# Patient Record
Sex: FEMALE | Race: WHITE | NOT HISPANIC OR LATINO
[De-identification: names, ages, dates, MRNs, and addresses within clinical notes are randomized per-mention and may not be internally consistent; named-entity substitution may affect disease eponyms.]

---

## 2017-02-09 ENCOUNTER — APPOINTMENT (OUTPATIENT)
Dept: RHEUMATOLOGY | Facility: CLINIC | Age: 77
End: 2017-02-09

## 2017-02-13 ENCOUNTER — RESULT REVIEW (OUTPATIENT)
Age: 77
End: 2017-02-13

## 2017-02-14 ENCOUNTER — APPOINTMENT (OUTPATIENT)
Dept: DERMATOLOGY | Facility: CLINIC | Age: 77
End: 2017-02-14

## 2017-02-18 ENCOUNTER — MEDICATION RENEWAL (OUTPATIENT)
Age: 77
End: 2017-02-18

## 2017-03-09 ENCOUNTER — APPOINTMENT (OUTPATIENT)
Dept: RHEUMATOLOGY | Facility: CLINIC | Age: 77
End: 2017-03-09

## 2017-03-16 ENCOUNTER — APPOINTMENT (OUTPATIENT)
Dept: RHEUMATOLOGY | Facility: CLINIC | Age: 77
End: 2017-03-16

## 2017-03-16 ENCOUNTER — RESULT REVIEW (OUTPATIENT)
Age: 77
End: 2017-03-16

## 2017-03-16 VITALS
RESPIRATION RATE: 18 BRPM | TEMPERATURE: 98.2 F | SYSTOLIC BLOOD PRESSURE: 134 MMHG | BODY MASS INDEX: 27.49 KG/M2 | DIASTOLIC BLOOD PRESSURE: 80 MMHG | HEART RATE: 80 BPM | WEIGHT: 165 LBS | HEIGHT: 65 IN

## 2017-03-17 ENCOUNTER — APPOINTMENT (OUTPATIENT)
Dept: DERMATOLOGY | Facility: CLINIC | Age: 77
End: 2017-03-17

## 2017-03-17 ENCOUNTER — RESULT CHARGE (OUTPATIENT)
Age: 77
End: 2017-03-17

## 2017-03-17 ENCOUNTER — APPOINTMENT (OUTPATIENT)
Dept: RHEUMATOLOGY | Facility: CLINIC | Age: 77
End: 2017-03-17

## 2017-03-19 LAB
ALBUMIN SERPL ELPH-MCNC: 4.3 G/DL
ALP BLD-CCNC: 103 U/L
ALT SERPL-CCNC: 38 U/L
ANION GAP SERPL CALC-SCNC: 14 MMOL/L
AST SERPL-CCNC: 32 U/L
BASOPHILS # BLD AUTO: 0.03 K/UL
BASOPHILS NFR BLD AUTO: 0.5 %
BILIRUB SERPL-MCNC: 0.4 MG/DL
BUN SERPL-MCNC: 19 MG/DL
CALCIUM SERPL-MCNC: 9.5 MG/DL
CHLORIDE SERPL-SCNC: 101 MMOL/L
CO2 SERPL-SCNC: 24 MMOL/L
CREAT SERPL-MCNC: 0.63 MG/DL
CRP SERPL-MCNC: <0.2 MG/DL
ENA SS-A AB SER IA-ACNC: <0.2 AL
ENA SS-B AB SER IA-ACNC: <0.2 AL
EOSINOPHIL # BLD AUTO: 0.18 K/UL
EOSINOPHIL NFR BLD AUTO: 3.1 %
GLUCOSE SERPL-MCNC: 78 MG/DL
HCT VFR BLD CALC: 40.8 %
HGB BLD-MCNC: 13.2 G/DL
IMM GRANULOCYTES NFR BLD AUTO: 0.2 %
LDH SERPL-CCNC: 218 U/L
LYMPHOCYTES # BLD AUTO: 1.48 K/UL
LYMPHOCYTES NFR BLD AUTO: 25.6 %
MAN DIFF?: NORMAL
MCHC RBC-ENTMCNC: 29.9 PG
MCHC RBC-ENTMCNC: 32.4 GM/DL
MCV RBC AUTO: 92.3 FL
MONOCYTES # BLD AUTO: 0.55 K/UL
MONOCYTES NFR BLD AUTO: 9.5 %
NEUTROPHILS # BLD AUTO: 3.54 K/UL
NEUTROPHILS NFR BLD AUTO: 61.1 %
PHOSPHATE SERPL-MCNC: 4.1 MG/DL
PLATELET # BLD AUTO: 341 K/UL
POTASSIUM SERPL-SCNC: 5.7 MMOL/L
PROT SERPL-MCNC: 6.9 G/DL
RBC # BLD: 4.42 M/UL
RBC # FLD: 13.7 %
SODIUM SERPL-SCNC: 139 MMOL/L
WBC # FLD AUTO: 5.79 K/UL

## 2017-03-24 ENCOUNTER — APPOINTMENT (OUTPATIENT)
Dept: DERMATOLOGY | Facility: CLINIC | Age: 77
End: 2017-03-24

## 2017-03-30 ENCOUNTER — APPOINTMENT (OUTPATIENT)
Dept: RHEUMATOLOGY | Facility: CLINIC | Age: 77
End: 2017-03-30

## 2017-07-03 ENCOUNTER — APPOINTMENT (OUTPATIENT)
Dept: RHEUMATOLOGY | Facility: CLINIC | Age: 77
End: 2017-07-03

## 2017-07-03 VITALS
TEMPERATURE: 98 F | HEIGHT: 65 IN | WEIGHT: 165 LBS | RESPIRATION RATE: 20 BRPM | BODY MASS INDEX: 27.49 KG/M2 | DIASTOLIC BLOOD PRESSURE: 78 MMHG | HEART RATE: 84 BPM | SYSTOLIC BLOOD PRESSURE: 122 MMHG

## 2017-07-03 DIAGNOSIS — M25.562 PAIN IN RIGHT KNEE: ICD-10-CM

## 2017-07-03 DIAGNOSIS — G89.29 PAIN IN RIGHT KNEE: ICD-10-CM

## 2017-07-03 DIAGNOSIS — M25.561 PAIN IN RIGHT KNEE: ICD-10-CM

## 2017-07-04 LAB
25(OH)D3 SERPL-MCNC: 54.7 NG/ML
ALBUMIN SERPL ELPH-MCNC: 4.4 G/DL
ALP BLD-CCNC: 66 U/L
ALT SERPL-CCNC: 22 U/L
ANION GAP SERPL CALC-SCNC: 18 MMOL/L
AST SERPL-CCNC: 27 U/L
BASOPHILS # BLD AUTO: 0.01 K/UL
BASOPHILS NFR BLD AUTO: 0.2 %
BILIRUB SERPL-MCNC: 0.3 MG/DL
BUN SERPL-MCNC: 16 MG/DL
CALCIUM SERPL-MCNC: 10.2 MG/DL
CHLORIDE SERPL-SCNC: 99 MMOL/L
CO2 SERPL-SCNC: 21 MMOL/L
CREAT SERPL-MCNC: 0.69 MG/DL
CRP SERPL-MCNC: <0.2 MG/DL
EOSINOPHIL # BLD AUTO: 0.26 K/UL
EOSINOPHIL NFR BLD AUTO: 4.5 %
GLUCOSE SERPL-MCNC: 62 MG/DL
HCT VFR BLD CALC: 40.1 %
HGB BLD-MCNC: 13.4 G/DL
IMM GRANULOCYTES NFR BLD AUTO: 0.2 %
LYMPHOCYTES # BLD AUTO: 1.46 K/UL
LYMPHOCYTES NFR BLD AUTO: 25.3 %
MAN DIFF?: NORMAL
MCHC RBC-ENTMCNC: 30.2 PG
MCHC RBC-ENTMCNC: 33.4 GM/DL
MCV RBC AUTO: 90.3 FL
MONOCYTES # BLD AUTO: 0.66 K/UL
MONOCYTES NFR BLD AUTO: 11.4 %
NEUTROPHILS # BLD AUTO: 3.37 K/UL
NEUTROPHILS NFR BLD AUTO: 58.4 %
PHOSPHATE SERPL-MCNC: 4.7 MG/DL
PLATELET # BLD AUTO: 337 K/UL
POTASSIUM SERPL-SCNC: 4.7 MMOL/L
PROT SERPL-MCNC: 7 G/DL
RBC # BLD: 4.44 M/UL
RBC # FLD: 14.7 %
SODIUM SERPL-SCNC: 138 MMOL/L
TSH SERPL-ACNC: 1.41 UIU/ML
WBC # FLD AUTO: 5.77 K/UL

## 2017-07-06 LAB
BILIRUB UR QL STRIP: NORMAL
COLLECTION METHOD: NORMAL
DEPRECATED KAPPA LC FREE/LAMBDA SER: 1.62 RATIO
ENA SS-A AB SER IA-ACNC: <0.2 AL
ENA SS-B AB SER IA-ACNC: <0.2 AL
GLUCOSE UR-MCNC: NORMAL
HCG UR QL: 0.2 EU/DL
HGB UR QL STRIP.AUTO: NORMAL
IGA SER QL IEP: 105 MG/DL
IGG SER QL IEP: 812 MG/DL
IGM SER QL IEP: 96 MG/DL
KAPPA LC CSF-MCNC: 1.51 MG/DL
KAPPA LC SERPL-MCNC: 2.45 MG/DL
KETONES UR-MCNC: NORMAL
LEUKOCYTE ESTERASE UR QL STRIP: NORMAL
M PROTEIN SPEC IFE-MCNC: NORMAL
NITRITE UR QL STRIP: NORMAL
PH UR STRIP: 6
PROT UR STRIP-MCNC: NORMAL
SP GR UR STRIP: 1.01
WESR: 5

## 2017-09-28 ENCOUNTER — APPOINTMENT (OUTPATIENT)
Dept: RHEUMATOLOGY | Facility: CLINIC | Age: 77
End: 2017-09-28

## 2017-10-02 ENCOUNTER — CLINICAL ADVICE (OUTPATIENT)
Age: 77
End: 2017-10-02

## 2017-10-12 ENCOUNTER — APPOINTMENT (OUTPATIENT)
Dept: RHEUMATOLOGY | Facility: CLINIC | Age: 77
End: 2017-10-12
Payer: MEDICARE

## 2017-10-12 PROCEDURE — 96372 THER/PROPH/DIAG INJ SC/IM: CPT

## 2017-11-03 ENCOUNTER — APPOINTMENT (OUTPATIENT)
Dept: RHEUMATOLOGY | Facility: CLINIC | Age: 77
End: 2017-11-03
Payer: MEDICARE

## 2017-11-03 VITALS
DIASTOLIC BLOOD PRESSURE: 63 MMHG | TEMPERATURE: 98.7 F | RESPIRATION RATE: 18 BRPM | BODY MASS INDEX: 24.99 KG/M2 | HEART RATE: 78 BPM | WEIGHT: 150 LBS | SYSTOLIC BLOOD PRESSURE: 139 MMHG | HEIGHT: 65 IN | OXYGEN SATURATION: 94 %

## 2017-11-03 PROCEDURE — 36415 COLL VENOUS BLD VENIPUNCTURE: CPT

## 2017-11-03 PROCEDURE — 99215 OFFICE O/P EST HI 40 MIN: CPT | Mod: 25

## 2017-11-03 PROCEDURE — 85651 RBC SED RATE NONAUTOMATED: CPT

## 2017-11-03 PROCEDURE — 81003 URINALYSIS AUTO W/O SCOPE: CPT | Mod: QW

## 2017-11-04 LAB
ALBUMIN SERPL ELPH-MCNC: 4 G/DL
ALP BLD-CCNC: 81 U/L
ALT SERPL-CCNC: 21 U/L
ANION GAP SERPL CALC-SCNC: 14 MMOL/L
AST SERPL-CCNC: 23 U/L
BASOPHILS # BLD AUTO: 0.02 K/UL
BASOPHILS NFR BLD AUTO: 0.4 %
BILIRUB SERPL-MCNC: 0.2 MG/DL
BILIRUB UR QL STRIP: NORMAL
BUN SERPL-MCNC: 17 MG/DL
CALCIUM SERPL-MCNC: 9.3 MG/DL
CHLORIDE SERPL-SCNC: 103 MMOL/L
CO2 SERPL-SCNC: 23 MMOL/L
COLLECTION METHOD: NORMAL
CREAT SERPL-MCNC: 0.63 MG/DL
CRP SERPL-MCNC: <0.2 MG/DL
ENA SS-A AB SER IA-ACNC: <0.2 AL
ENA SS-B AB SER IA-ACNC: <0.2 AL
EOSINOPHIL # BLD AUTO: 0.29 K/UL
EOSINOPHIL NFR BLD AUTO: 5.1 %
GLUCOSE SERPL-MCNC: 72 MG/DL
GLUCOSE UR-MCNC: NORMAL
HCG UR QL: 0.2 EU/DL
HCT VFR BLD CALC: 36.8 %
HGB BLD-MCNC: 12.6 G/DL
HGB UR QL STRIP.AUTO: NORMAL
IMM GRANULOCYTES NFR BLD AUTO: 0 %
KETONES UR-MCNC: NORMAL
LEUKOCYTE ESTERASE UR QL STRIP: NORMAL
LYMPHOCYTES # BLD AUTO: 1.19 K/UL
LYMPHOCYTES NFR BLD AUTO: 20.8 %
MAN DIFF?: NORMAL
MCHC RBC-ENTMCNC: 31.5 PG
MCHC RBC-ENTMCNC: 34.2 GM/DL
MCV RBC AUTO: 92 FL
MONOCYTES # BLD AUTO: 0.72 K/UL
MONOCYTES NFR BLD AUTO: 12.6 %
NEUTROPHILS # BLD AUTO: 3.49 K/UL
NEUTROPHILS NFR BLD AUTO: 61.1 %
NITRITE UR QL STRIP: NORMAL
PH UR STRIP: 5.5
PHOSPHATE SERPL-MCNC: 4 MG/DL
PLATELET # BLD AUTO: 310 K/UL
POTASSIUM SERPL-SCNC: 4.9 MMOL/L
PROT SERPL-MCNC: 6 G/DL
PROT UR STRIP-MCNC: NORMAL
RBC # BLD: 4 M/UL
RBC # FLD: 14.1 %
SODIUM SERPL-SCNC: 140 MMOL/L
SP GR UR STRIP: 1.02
TSH SERPL-ACNC: 1.39 UIU/ML
WBC # FLD AUTO: 5.71 K/UL

## 2017-11-05 RX ORDER — MECLOFENAMATE SODIUM 100 MG/1
100 CAPSULE ORAL
Qty: 180 | Refills: 0 | Status: DISCONTINUED | COMMUNITY
Start: 2017-07-03 | End: 2017-11-05

## 2017-11-05 RX ORDER — MECLOFENAMATE SODIUM 100 MG/1
100 CAPSULE ORAL
Qty: 60 | Refills: 0 | Status: DISCONTINUED | COMMUNITY
Start: 2017-07-03 | End: 2017-11-05

## 2017-11-06 LAB — WESR: 3

## 2017-11-27 ENCOUNTER — APPOINTMENT (OUTPATIENT)
Dept: ORTHOPEDIC SURGERY | Facility: CLINIC | Age: 77
End: 2017-11-27
Payer: MEDICARE

## 2017-11-27 VITALS
DIASTOLIC BLOOD PRESSURE: 95 MMHG | HEIGHT: 64 IN | WEIGHT: 150 LBS | TEMPERATURE: 98.3 F | BODY MASS INDEX: 25.61 KG/M2 | SYSTOLIC BLOOD PRESSURE: 158 MMHG | HEART RATE: 81 BPM

## 2017-11-27 DIAGNOSIS — M17.0 BILATERAL PRIMARY OSTEOARTHRITIS OF KNEE: ICD-10-CM

## 2017-11-27 PROCEDURE — 99214 OFFICE O/P EST MOD 30 MIN: CPT

## 2017-12-12 ENCOUNTER — APPOINTMENT (OUTPATIENT)
Dept: RHEUMATOLOGY | Facility: CLINIC | Age: 77
End: 2017-12-12
Payer: MEDICARE

## 2017-12-12 PROCEDURE — 77080 DXA BONE DENSITY AXIAL: CPT

## 2018-01-03 ENCOUNTER — TRANSCRIPTION ENCOUNTER (OUTPATIENT)
Age: 78
End: 2018-01-03

## 2018-01-09 ENCOUNTER — FORM ENCOUNTER (OUTPATIENT)
Age: 78
End: 2018-01-09

## 2018-01-10 ENCOUNTER — OUTPATIENT (OUTPATIENT)
Dept: OUTPATIENT SERVICES | Facility: HOSPITAL | Age: 78
LOS: 1 days | End: 2018-01-10
Payer: MEDICARE

## 2018-01-10 VITALS
RESPIRATION RATE: 20 BRPM | WEIGHT: 145.51 LBS | DIASTOLIC BLOOD PRESSURE: 99 MMHG | TEMPERATURE: 98 F | SYSTOLIC BLOOD PRESSURE: 159 MMHG | HEIGHT: 62 IN | HEART RATE: 77 BPM

## 2018-01-10 DIAGNOSIS — Z01.818 ENCOUNTER FOR OTHER PREPROCEDURAL EXAMINATION: ICD-10-CM

## 2018-01-10 DIAGNOSIS — F32.9 MAJOR DEPRESSIVE DISORDER, SINGLE EPISODE, UNSPECIFIED: ICD-10-CM

## 2018-01-10 DIAGNOSIS — Z98.890 OTHER SPECIFIED POSTPROCEDURAL STATES: Chronic | ICD-10-CM

## 2018-01-10 DIAGNOSIS — R56.9 UNSPECIFIED CONVULSIONS: ICD-10-CM

## 2018-01-10 DIAGNOSIS — M17.12 UNILATERAL PRIMARY OSTEOARTHRITIS, LEFT KNEE: ICD-10-CM

## 2018-01-10 DIAGNOSIS — S62.101A FRACTURE OF UNSPECIFIED CARPAL BONE, RIGHT WRIST, INITIAL ENCOUNTER FOR CLOSED FRACTURE: Chronic | ICD-10-CM

## 2018-01-10 LAB
ANION GAP SERPL CALC-SCNC: 11 MMOL/L — SIGNIFICANT CHANGE UP (ref 5–17)
APTT BLD: 29.4 SEC — SIGNIFICANT CHANGE UP (ref 27.5–37.4)
BASOPHILS # BLD AUTO: 0 K/UL — SIGNIFICANT CHANGE UP (ref 0–0.2)
BASOPHILS NFR BLD AUTO: 0.3 % — SIGNIFICANT CHANGE UP (ref 0–2)
BLD GP AB SCN SERPL QL: SIGNIFICANT CHANGE UP
BUN SERPL-MCNC: 12 MG/DL — SIGNIFICANT CHANGE UP (ref 8–20)
CALCIUM SERPL-MCNC: 9.6 MG/DL — SIGNIFICANT CHANGE UP (ref 8.6–10.2)
CHLORIDE SERPL-SCNC: 99 MMOL/L — SIGNIFICANT CHANGE UP (ref 98–107)
CO2 SERPL-SCNC: 28 MMOL/L — SIGNIFICANT CHANGE UP (ref 22–29)
CREAT SERPL-MCNC: 0.45 MG/DL — LOW (ref 0.5–1.3)
EOSINOPHIL # BLD AUTO: 0.2 K/UL — SIGNIFICANT CHANGE UP (ref 0–0.5)
EOSINOPHIL NFR BLD AUTO: 3.4 % — SIGNIFICANT CHANGE UP (ref 0–6)
GLUCOSE SERPL-MCNC: 79 MG/DL — SIGNIFICANT CHANGE UP (ref 70–115)
HCT VFR BLD CALC: 40.4 % — SIGNIFICANT CHANGE UP (ref 37–47)
HGB BLD-MCNC: 13.4 G/DL — SIGNIFICANT CHANGE UP (ref 12–16)
INR BLD: 0.99 RATIO — SIGNIFICANT CHANGE UP (ref 0.88–1.16)
LYMPHOCYTES # BLD AUTO: 1.6 K/UL — SIGNIFICANT CHANGE UP (ref 1–4.8)
LYMPHOCYTES # BLD AUTO: 24.1 % — SIGNIFICANT CHANGE UP (ref 20–55)
MCHC RBC-ENTMCNC: 30.6 PG — SIGNIFICANT CHANGE UP (ref 27–31)
MCHC RBC-ENTMCNC: 33.2 G/DL — SIGNIFICANT CHANGE UP (ref 32–36)
MCV RBC AUTO: 92.2 FL — SIGNIFICANT CHANGE UP (ref 81–99)
MONOCYTES # BLD AUTO: 0.5 K/UL — SIGNIFICANT CHANGE UP (ref 0–0.8)
MONOCYTES NFR BLD AUTO: 7.6 % — SIGNIFICANT CHANGE UP (ref 3–10)
MRSA PCR RESULT.: SIGNIFICANT CHANGE UP
NEUTROPHILS # BLD AUTO: 4.2 K/UL — SIGNIFICANT CHANGE UP (ref 1.8–8)
NEUTROPHILS NFR BLD AUTO: 64.4 % — SIGNIFICANT CHANGE UP (ref 37–73)
PLATELET # BLD AUTO: 385 K/UL — SIGNIFICANT CHANGE UP (ref 150–400)
POTASSIUM SERPL-MCNC: 5.5 MMOL/L — HIGH (ref 3.5–5.3)
POTASSIUM SERPL-SCNC: 5.5 MMOL/L — HIGH (ref 3.5–5.3)
PROTHROM AB SERPL-ACNC: 10.9 SEC — SIGNIFICANT CHANGE UP (ref 9.8–12.7)
RBC # BLD: 4.38 M/UL — LOW (ref 4.4–5.2)
RBC # FLD: 12.9 % — SIGNIFICANT CHANGE UP (ref 11–15.6)
S AUREUS DNA NOSE QL NAA+PROBE: SIGNIFICANT CHANGE UP
SODIUM SERPL-SCNC: 138 MMOL/L — SIGNIFICANT CHANGE UP (ref 135–145)
TYPE + AB SCN PNL BLD: SIGNIFICANT CHANGE UP
WBC # BLD: 6.5 K/UL — SIGNIFICANT CHANGE UP (ref 4.8–10.8)
WBC # FLD AUTO: 6.5 K/UL — SIGNIFICANT CHANGE UP (ref 4.8–10.8)

## 2018-01-10 PROCEDURE — G0463: CPT

## 2018-01-10 PROCEDURE — 93010 ELECTROCARDIOGRAM REPORT: CPT

## 2018-01-10 PROCEDURE — 71046 X-RAY EXAM CHEST 2 VIEWS: CPT

## 2018-01-10 PROCEDURE — 85027 COMPLETE CBC AUTOMATED: CPT

## 2018-01-10 PROCEDURE — 80048 BASIC METABOLIC PNL TOTAL CA: CPT

## 2018-01-10 PROCEDURE — 86850 RBC ANTIBODY SCREEN: CPT

## 2018-01-10 PROCEDURE — 93005 ELECTROCARDIOGRAM TRACING: CPT

## 2018-01-10 PROCEDURE — 71046 X-RAY EXAM CHEST 2 VIEWS: CPT | Mod: 26

## 2018-01-10 PROCEDURE — 86900 BLOOD TYPING SEROLOGIC ABO: CPT

## 2018-01-10 PROCEDURE — 36415 COLL VENOUS BLD VENIPUNCTURE: CPT

## 2018-01-10 PROCEDURE — 87641 MR-STAPH DNA AMP PROBE: CPT

## 2018-01-10 PROCEDURE — 86901 BLOOD TYPING SEROLOGIC RH(D): CPT

## 2018-01-10 PROCEDURE — 85610 PROTHROMBIN TIME: CPT

## 2018-01-10 PROCEDURE — 87640 STAPH A DNA AMP PROBE: CPT

## 2018-01-10 PROCEDURE — 85730 THROMBOPLASTIN TIME PARTIAL: CPT

## 2018-01-10 RX ORDER — SUMATRIPTAN SUCCINATE 4 MG/.5ML
1 INJECTION, SOLUTION SUBCUTANEOUS
Qty: 0 | Refills: 0 | COMMUNITY

## 2018-01-10 NOTE — H&P PST ADULT - HISTORY OF PRESENT ILLNESS
Pt is a 77 y.o female with OA of bilateral knees for the past couple of years now scheduled for a left knee total joint replacement.

## 2018-01-10 NOTE — H&P PST ADULT - ASSESSMENT
Pt is a 77 y.o female with PMH of seizures, depression, and OA undergoing a left knee total joint replacement. Instructed to hold any medications containing ibuprofen 1 week prior to surgery. Hold aspirin 1 week prior to surgery. Hold any vitamins 1 week prior to surgery.

## 2018-01-10 NOTE — PATIENT PROFILE ADULT. - LEARNING ASSESSMENT (PATIENT) ADDITIONAL COMMENTS
Educated on pain scale and management. Tips for safer surgery and pre op instructions given. Demonstrates understanding.

## 2018-01-10 NOTE — H&P PST ADULT - NSANTHOSAYNRD_GEN_A_CORE
No. EVERT screening performed.  STOP BANG Legend: 0-2 = LOW Risk; 3-4 = INTERMEDIATE Risk; 5-8 = HIGH Risk

## 2018-01-10 NOTE — H&P PST ADULT - PMH
Baker's cyst of knee  bilateral  Breast mass, right    Cataract    Depression    GERD (gastroesophageal reflux disease)    Migraines    Osteoarthritis    Osteoporosis    Seizures    Tremor of both hands Baker's cyst of knee  bilateral  Breast mass, right    Cataract    Depression    GERD (gastroesophageal reflux disease)    Migraines    Osteoarthritis    Osteoporosis    Seizures  Last episode Oct 2013  Tremor of both hands

## 2018-01-10 NOTE — H&P PST ADULT - VENOUS THROMBOEMBOLISM FOR WOMEN ONLY
Pending Prescriptions:                       Disp   Refills    levothyroxine (SYNTHROID/LEVOTHROID) 77 M*90 tab*0            Sig: TAKE 1 TABLET BY MOUTH EVERY DAY    Last OV 3/1/17  CMP 9/6/16  
none

## 2018-01-30 RX ORDER — OXYCODONE HYDROCHLORIDE 5 MG/1
10 TABLET ORAL ONCE
Qty: 0 | Refills: 0 | Status: DISCONTINUED | OUTPATIENT
Start: 2018-02-01 | End: 2018-02-01

## 2018-01-30 RX ORDER — GABAPENTIN 400 MG/1
600 CAPSULE ORAL ONCE
Qty: 0 | Refills: 0 | Status: COMPLETED | OUTPATIENT
Start: 2018-02-01 | End: 2018-02-01

## 2018-01-31 ENCOUNTER — FORM ENCOUNTER (OUTPATIENT)
Age: 78
End: 2018-01-31

## 2018-02-01 ENCOUNTER — APPOINTMENT (OUTPATIENT)
Dept: ORTHOPEDIC SURGERY | Facility: HOSPITAL | Age: 78
End: 2018-02-01

## 2018-02-01 ENCOUNTER — RESULT REVIEW (OUTPATIENT)
Age: 78
End: 2018-02-01

## 2018-02-01 ENCOUNTER — TRANSCRIPTION ENCOUNTER (OUTPATIENT)
Age: 78
End: 2018-02-01

## 2018-02-01 ENCOUNTER — INPATIENT (INPATIENT)
Facility: HOSPITAL | Age: 78
LOS: 1 days | Discharge: HOME | DRG: 470 | End: 2018-02-03
Attending: ORTHOPAEDIC SURGERY | Admitting: ORTHOPAEDIC SURGERY
Payer: MEDICARE

## 2018-02-01 VITALS
HEIGHT: 64 IN | HEART RATE: 84 BPM | TEMPERATURE: 99 F | RESPIRATION RATE: 16 BRPM | OXYGEN SATURATION: 97 % | DIASTOLIC BLOOD PRESSURE: 73 MMHG | WEIGHT: 145.51 LBS | SYSTOLIC BLOOD PRESSURE: 153 MMHG

## 2018-02-01 DIAGNOSIS — Z00.00 ENCOUNTER FOR GENERAL ADULT MEDICAL EXAMINATION WITHOUT ABNORMAL FINDINGS: ICD-10-CM

## 2018-02-01 DIAGNOSIS — M17.12 UNILATERAL PRIMARY OSTEOARTHRITIS, LEFT KNEE: ICD-10-CM

## 2018-02-01 DIAGNOSIS — S62.101A FRACTURE OF UNSPECIFIED CARPAL BONE, RIGHT WRIST, INITIAL ENCOUNTER FOR CLOSED FRACTURE: Chronic | ICD-10-CM

## 2018-02-01 DIAGNOSIS — G43.909 MIGRAINE, UNSPECIFIED, NOT INTRACTABLE, WITHOUT STATUS MIGRAINOSUS: ICD-10-CM

## 2018-02-01 DIAGNOSIS — R25.1 TREMOR, UNSPECIFIED: ICD-10-CM

## 2018-02-01 DIAGNOSIS — K21.9 GASTRO-ESOPHAGEAL REFLUX DISEASE WITHOUT ESOPHAGITIS: ICD-10-CM

## 2018-02-01 DIAGNOSIS — Z96.652 PRESENCE OF LEFT ARTIFICIAL KNEE JOINT: ICD-10-CM

## 2018-02-01 DIAGNOSIS — Z29.9 ENCOUNTER FOR PROPHYLACTIC MEASURES, UNSPECIFIED: ICD-10-CM

## 2018-02-01 DIAGNOSIS — F32.9 MAJOR DEPRESSIVE DISORDER, SINGLE EPISODE, UNSPECIFIED: ICD-10-CM

## 2018-02-01 DIAGNOSIS — Z98.890 OTHER SPECIFIED POSTPROCEDURAL STATES: Chronic | ICD-10-CM

## 2018-02-01 LAB — BLD GP AB SCN SERPL QL: SIGNIFICANT CHANGE UP

## 2018-02-01 PROCEDURE — 27447 TOTAL KNEE ARTHROPLASTY: CPT | Mod: LT

## 2018-02-01 PROCEDURE — 73560 X-RAY EXAM OF KNEE 1 OR 2: CPT | Mod: 26,LT

## 2018-02-01 PROCEDURE — 27447 TOTAL KNEE ARTHROPLASTY: CPT | Mod: AS,LT

## 2018-02-01 PROCEDURE — 99222 1ST HOSP IP/OBS MODERATE 55: CPT

## 2018-02-01 PROCEDURE — 88305 TISSUE EXAM BY PATHOLOGIST: CPT | Mod: 26

## 2018-02-01 PROCEDURE — 20985 CPTR-ASST DIR MS PX: CPT

## 2018-02-01 PROCEDURE — 20985 CPTR-ASST DIR MS PX: CPT | Mod: AS

## 2018-02-01 PROCEDURE — 88311 DECALCIFY TISSUE: CPT | Mod: 26

## 2018-02-01 RX ORDER — LEVETIRACETAM 250 MG/1
750 TABLET, FILM COATED ORAL AT BEDTIME
Qty: 0 | Refills: 0 | Status: DISCONTINUED | OUTPATIENT
Start: 2018-02-01 | End: 2018-02-03

## 2018-02-01 RX ORDER — HYDROMORPHONE HYDROCHLORIDE 2 MG/ML
0.5 INJECTION INTRAMUSCULAR; INTRAVENOUS; SUBCUTANEOUS
Qty: 0 | Refills: 0 | Status: DISCONTINUED | OUTPATIENT
Start: 2018-02-01 | End: 2018-02-03

## 2018-02-01 RX ORDER — KETOROLAC TROMETHAMINE 30 MG/ML
15 SYRINGE (ML) INJECTION EVERY 6 HOURS
Qty: 0 | Refills: 0 | Status: DISCONTINUED | OUTPATIENT
Start: 2018-02-01 | End: 2018-02-01

## 2018-02-01 RX ORDER — ASCORBIC ACID 60 MG
500 TABLET,CHEWABLE ORAL
Qty: 0 | Refills: 0 | Status: DISCONTINUED | OUTPATIENT
Start: 2018-02-01 | End: 2018-02-03

## 2018-02-01 RX ORDER — ONDANSETRON 8 MG/1
4 TABLET, FILM COATED ORAL EVERY 6 HOURS
Qty: 0 | Refills: 0 | Status: DISCONTINUED | OUTPATIENT
Start: 2018-02-01 | End: 2018-02-03

## 2018-02-01 RX ORDER — MAGNESIUM HYDROXIDE 400 MG/1
30 TABLET, CHEWABLE ORAL DAILY
Qty: 0 | Refills: 0 | Status: DISCONTINUED | OUTPATIENT
Start: 2018-02-01 | End: 2018-02-03

## 2018-02-01 RX ORDER — TRANEXAMIC ACID 100 MG/ML
650 INJECTION, SOLUTION INTRAVENOUS ONCE
Qty: 0 | Refills: 0 | Status: DISCONTINUED | OUTPATIENT
Start: 2018-02-01 | End: 2018-02-01

## 2018-02-01 RX ORDER — ACETAMINOPHEN 500 MG
1000 TABLET ORAL ONCE
Qty: 0 | Refills: 0 | Status: DISCONTINUED | OUTPATIENT
Start: 2018-02-01 | End: 2018-02-01

## 2018-02-01 RX ORDER — HYDROMORPHONE HYDROCHLORIDE 2 MG/ML
2 INJECTION INTRAMUSCULAR; INTRAVENOUS; SUBCUTANEOUS
Qty: 0 | Refills: 0 | Status: DISCONTINUED | OUTPATIENT
Start: 2018-02-01 | End: 2018-02-03

## 2018-02-01 RX ORDER — SUMATRIPTAN SUCCINATE 4 MG/.5ML
50 INJECTION, SOLUTION SUBCUTANEOUS ONCE
Qty: 0 | Refills: 0 | Status: COMPLETED | OUTPATIENT
Start: 2018-02-01 | End: 2018-02-02

## 2018-02-01 RX ORDER — VANCOMYCIN HCL 1 G
1000 VIAL (EA) INTRAVENOUS
Qty: 0 | Refills: 0 | Status: COMPLETED | OUTPATIENT
Start: 2018-02-01 | End: 2018-02-01

## 2018-02-01 RX ORDER — ACETAMINOPHEN 500 MG
975 TABLET ORAL EVERY 8 HOURS
Qty: 0 | Refills: 0 | Status: DISCONTINUED | OUTPATIENT
Start: 2018-02-01 | End: 2018-02-03

## 2018-02-01 RX ORDER — ASPIRIN/CALCIUM CARB/MAGNESIUM 324 MG
325 TABLET ORAL
Qty: 0 | Refills: 0 | Status: DISCONTINUED | OUTPATIENT
Start: 2018-02-02 | End: 2018-02-03

## 2018-02-01 RX ORDER — CEFAZOLIN SODIUM 1 G
2000 VIAL (EA) INJECTION
Qty: 0 | Refills: 0 | Status: COMPLETED | OUTPATIENT
Start: 2018-02-01 | End: 2018-02-02

## 2018-02-01 RX ORDER — PRIMIDONE 250 MG/1
100 TABLET ORAL DAILY
Qty: 0 | Refills: 0 | Status: DISCONTINUED | OUTPATIENT
Start: 2018-02-01 | End: 2018-02-03

## 2018-02-01 RX ORDER — OXYCODONE HYDROCHLORIDE 5 MG/1
10 TABLET ORAL
Qty: 0 | Refills: 0 | Status: DISCONTINUED | OUTPATIENT
Start: 2018-02-01 | End: 2018-02-03

## 2018-02-01 RX ORDER — CELECOXIB 200 MG/1
400 CAPSULE ORAL ONCE
Qty: 0 | Refills: 0 | Status: COMPLETED | OUTPATIENT
Start: 2018-02-01 | End: 2018-02-01

## 2018-02-01 RX ORDER — SODIUM CHLORIDE 9 MG/ML
1000 INJECTION, SOLUTION INTRAVENOUS
Qty: 0 | Refills: 0 | Status: DISCONTINUED | OUTPATIENT
Start: 2018-02-01 | End: 2018-02-02

## 2018-02-01 RX ORDER — PANTOPRAZOLE SODIUM 20 MG/1
40 TABLET, DELAYED RELEASE ORAL
Qty: 0 | Refills: 0 | Status: DISCONTINUED | OUTPATIENT
Start: 2018-02-01 | End: 2018-02-03

## 2018-02-01 RX ORDER — DIPHENHYDRAMINE HCL 50 MG
25 CAPSULE ORAL AT BEDTIME
Qty: 0 | Refills: 0 | Status: DISCONTINUED | OUTPATIENT
Start: 2018-02-01 | End: 2018-02-03

## 2018-02-01 RX ORDER — CEFAZOLIN SODIUM 1 G
2000 VIAL (EA) INJECTION ONCE
Qty: 0 | Refills: 0 | Status: DISCONTINUED | OUTPATIENT
Start: 2018-02-01 | End: 2018-02-01

## 2018-02-01 RX ORDER — CITALOPRAM 10 MG/1
10 TABLET, FILM COATED ORAL DAILY
Qty: 0 | Refills: 0 | Status: DISCONTINUED | OUTPATIENT
Start: 2018-02-01 | End: 2018-02-03

## 2018-02-01 RX ORDER — OXYCODONE HYDROCHLORIDE 5 MG/1
5 TABLET ORAL
Qty: 0 | Refills: 0 | Status: DISCONTINUED | OUTPATIENT
Start: 2018-02-01 | End: 2018-02-03

## 2018-02-01 RX ORDER — SODIUM CHLORIDE 9 MG/ML
1000 INJECTION, SOLUTION INTRAVENOUS
Qty: 0 | Refills: 0 | Status: DISCONTINUED | OUTPATIENT
Start: 2018-02-01 | End: 2018-02-01

## 2018-02-01 RX ORDER — FENTANYL CITRATE 50 UG/ML
50 INJECTION INTRAVENOUS
Qty: 0 | Refills: 0 | Status: DISCONTINUED | OUTPATIENT
Start: 2018-02-01 | End: 2018-02-01

## 2018-02-01 RX ORDER — ONDANSETRON 8 MG/1
4 TABLET, FILM COATED ORAL ONCE
Qty: 0 | Refills: 0 | Status: DISCONTINUED | OUTPATIENT
Start: 2018-02-01 | End: 2018-02-01

## 2018-02-01 RX ORDER — POLYETHYLENE GLYCOL 3350 17 G/17G
17 POWDER, FOR SOLUTION ORAL DAILY
Qty: 0 | Refills: 0 | Status: DISCONTINUED | OUTPATIENT
Start: 2018-02-01 | End: 2018-02-03

## 2018-02-01 RX ORDER — VANCOMYCIN HCL 1 G
1000 VIAL (EA) INTRAVENOUS ONCE
Qty: 0 | Refills: 0 | Status: COMPLETED | OUTPATIENT
Start: 2018-02-01 | End: 2018-02-01

## 2018-02-01 RX ORDER — FERROUS SULFATE 325(65) MG
325 TABLET ORAL
Qty: 0 | Refills: 0 | Status: DISCONTINUED | OUTPATIENT
Start: 2018-02-01 | End: 2018-02-02

## 2018-02-01 RX ORDER — FOLIC ACID 0.8 MG
1 TABLET ORAL DAILY
Qty: 0 | Refills: 0 | Status: DISCONTINUED | OUTPATIENT
Start: 2018-02-01 | End: 2018-02-03

## 2018-02-01 RX ORDER — SENNA PLUS 8.6 MG/1
2 TABLET ORAL AT BEDTIME
Qty: 0 | Refills: 0 | Status: DISCONTINUED | OUTPATIENT
Start: 2018-02-01 | End: 2018-02-03

## 2018-02-01 RX ORDER — ACETAMINOPHEN 500 MG
1000 TABLET ORAL ONCE
Qty: 0 | Refills: 0 | Status: COMPLETED | OUTPATIENT
Start: 2018-02-01 | End: 2018-02-01

## 2018-02-01 RX ORDER — SODIUM CHLORIDE 9 MG/ML
3 INJECTION INTRAMUSCULAR; INTRAVENOUS; SUBCUTANEOUS EVERY 8 HOURS
Qty: 0 | Refills: 0 | Status: DISCONTINUED | OUTPATIENT
Start: 2018-02-01 | End: 2018-02-01

## 2018-02-01 RX ORDER — DOCUSATE SODIUM 100 MG
100 CAPSULE ORAL THREE TIMES A DAY
Qty: 0 | Refills: 0 | Status: DISCONTINUED | OUTPATIENT
Start: 2018-02-01 | End: 2018-02-03

## 2018-02-01 RX ADMIN — Medication 15 MILLIGRAM(S): at 23:38

## 2018-02-01 RX ADMIN — LEVETIRACETAM 750 MILLIGRAM(S): 250 TABLET, FILM COATED ORAL at 21:46

## 2018-02-01 RX ADMIN — OXYCODONE HYDROCHLORIDE 10 MILLIGRAM(S): 5 TABLET ORAL at 09:07

## 2018-02-01 RX ADMIN — Medication 15 MILLIGRAM(S): at 18:33

## 2018-02-01 RX ADMIN — SODIUM CHLORIDE 100 MILLILITER(S): 9 INJECTION, SOLUTION INTRAVENOUS at 21:47

## 2018-02-01 RX ADMIN — Medication 400 MILLIGRAM(S): at 21:46

## 2018-02-01 RX ADMIN — GABAPENTIN 600 MILLIGRAM(S): 400 CAPSULE ORAL at 09:06

## 2018-02-01 RX ADMIN — CELECOXIB 400 MILLIGRAM(S): 200 CAPSULE ORAL at 09:06

## 2018-02-01 RX ADMIN — Medication 250 MILLIGRAM(S): at 22:31

## 2018-02-01 RX ADMIN — Medication 1000 MILLIGRAM(S): at 21:46

## 2018-02-01 RX ADMIN — OXYCODONE HYDROCHLORIDE 10 MILLIGRAM(S): 5 TABLET ORAL at 10:00

## 2018-02-01 RX ADMIN — Medication 15 MILLIGRAM(S): at 19:00

## 2018-02-01 RX ADMIN — Medication 100 MILLIGRAM(S): at 21:46

## 2018-02-01 RX ADMIN — Medication 325 MILLIGRAM(S): at 18:33

## 2018-02-01 RX ADMIN — CELECOXIB 400 MILLIGRAM(S): 200 CAPSULE ORAL at 10:00

## 2018-02-01 RX ADMIN — Medication 500 MILLIGRAM(S): at 18:31

## 2018-02-01 RX ADMIN — Medication 975 MILLIGRAM(S): at 21:46

## 2018-02-01 RX ADMIN — Medication 250 MILLIGRAM(S): at 09:37

## 2018-02-01 RX ADMIN — Medication 100 MILLIGRAM(S): at 18:32

## 2018-02-01 NOTE — DISCHARGE NOTE ADULT - MEDICATION SUMMARY - MEDICATIONS TO TAKE
I will START or STAY ON the medications listed below when I get home from the hospital:    oxyCODONE 5 mg oral tablet  -- 1-2 tab(s) by mouth every 4 to 6 hours, As Needed MDD:8  -- Indication: For Pain    aspirin 325 mg oral delayed release tablet  -- 1 tab(s) by mouth 2 times a day x 6 weeks  -- Indication: For DVTP    fenoprofen 600 mg oral tablet  -- 1 tab(s) by mouth 2 times a day  -- Indication: For Home med    SUMAtriptan 50 mg oral tablet  -- 1 tab(s) by mouth once, As Needed  -- Indication: For Home med    levETIRAcetam 750 mg oral tablet  -- 1 tab(s) by mouth once a day (at bedtime)  -- Indication: For Home med    primidone 50 mg oral tablet  -- 2 suppository(ies) by mouth once a day  -- Indication: For Home med    citalopram 10 mg oral tablet  -- 1 tab(s) by mouth once a day  -- Indication: For Home med    Prolia 60 mg/mL subcutaneous solution  -- 1 milliliter(s) subcutaneous every 6 months  -- Indication: For Home med    Senna S 50 mg-8.6 mg oral tablet  -- 2 tab(s) by mouth once a day (at bedtime)   -- Medication should be taken with plenty of water.    -- Indication: For Constipation    Fish Oil 1200 mg oral capsule  -- 1 cap(s) by mouth once a day  -- Indication: For Home med    omeprazole 40 mg oral delayed release capsule  -- 1 cap(s) by mouth once a day  -- Indication: For Home med    Calcium 600+D oral tablet  -- 1 tab(s) by mouth once a day  -- Indication: For Home med    B Complex 50 oral tablet, extended release  -- 1 tab(s) by mouth once a day  -- Indication: For Home med    Vitamin D2 50,000 intl units (1.25 mg) oral capsule  -- 1 cap(s) by mouth every 2 weeks  -- Indication: For Home med I will START or STAY ON the medications listed below when I get home from the hospital:    oxyCODONE 5 mg oral tablet  -- 1-2 tab(s) by mouth every 4 to 6 hours, As Needed MDD:8  -- Indication: For Pain    aspirin 325 mg oral delayed release tablet  -- 1 tab(s) by mouth 2 times a day x 6 weeks  -- Indication: For DVTP    SUMAtriptan 50 mg oral tablet  -- 1 tab(s) by mouth once, As Needed  -- Indication: For Home med    levETIRAcetam 750 mg oral tablet  -- 1 tab(s) by mouth once a day (at bedtime)  -- Indication: For Home med    primidone 50 mg oral tablet  -- 2 suppository(ies) by mouth once a day  -- Indication: For Home med    citalopram 10 mg oral tablet  -- 1 tab(s) by mouth once a day  -- Indication: For Home med    Prolia 60 mg/mL subcutaneous solution  -- 1 milliliter(s) subcutaneous every 6 months  -- Indication: For Home med    Senna S 50 mg-8.6 mg oral tablet  -- 2 tab(s) by mouth once a day (at bedtime)   -- Medication should be taken with plenty of water.    -- Indication: For Constipation    Fish Oil 1200 mg oral capsule  -- 1 cap(s) by mouth once a day  -- Indication: For Home med    omeprazole 40 mg oral delayed release capsule  -- 1 cap(s) by mouth once a day  -- Indication: For Home med    Calcium 600+D oral tablet  -- 1 tab(s) by mouth once a day  -- Indication: For Home med    B Complex 50 oral tablet, extended release  -- 1 tab(s) by mouth once a day  -- Indication: For Home med    Vitamin D2 50,000 intl units (1.25 mg) oral capsule  -- 1 cap(s) by mouth every 2 weeks  -- Indication: For Home med

## 2018-02-01 NOTE — PHYSICAL THERAPY INITIAL EVALUATION ADULT - BALANCE DISTURBANCE, IDENTIFIED IMPAIRMENT CONTRIBUTE, REHAB EVAL
impaired motor control/decreased ROM/impaired coordination/impaired sensory feedback/decreased strength

## 2018-02-01 NOTE — PHYSICAL THERAPY INITIAL EVALUATION ADULT - IMPAIRMENTS CONTRIBUTING TO GAIT DEVIATIONS, PT EVAL
impaired motor control/impaired postural control/impaired sensory feedback/decreased ROM/impaired balance/impaired coordination/decreased strength

## 2018-02-01 NOTE — PHYSICAL THERAPY INITIAL EVALUATION ADULT - IMPAIRED TRANSFERS: SIT/STAND, REHAB EVAL
impaired sensory feedback/impaired motor control/impaired balance/decreased strength/impaired coordination/decreased ROM

## 2018-02-01 NOTE — CONSULT NOTE ADULT - SUBJECTIVE AND OBJECTIVE BOX
PMD : [place]inoto   Neuro : Cora     Patient is a 77y old  Female who presents with a chief c/o L knee pain / OA for elective TKA , POD #0 , seen on PACU     CC: L knee pain     HPI:  Pt is a 77 y.o female with OA of bilateral knees for the past couple of years now S/P L TKA  No hx of inj ,was taking Fenoprofen .   Pain was getting worst .      PAST MEDICAL & SURGICAL HISTORY:  Breast mass, right  Baker's cyst of knee: bilateral  Tremor of both hands  Cataract  Osteoporosis  Migraines  Seizures: Last episode Oct 2013  Depression  GERD (gastroesophageal reflux disease)  Osteoarthritis  Right wrist fracture: may 2014  H/O right breast biopsy     Social History:  · Marital Status	  · Occupation	retired  · Lives With	alone     Substance Use History:  · Substance Use	caffeine  · Caffeine Type	coffee; tea  · Caffeine Amount/Frequency	3-4 cups/cans per day  last cup of coffee decaffeinated     Alcohol Use History:  · Have you ever consumed alcohol	yes...  · Alcohol Type	liquor  · Alcohol Frequency	monthly or less  · Alcohol Amount	1-2 drinks  · Problems Related to Alcohol Use	no  · 1. Have you felt you ought to CUT down on your drinking?	no  · 2. Have people ANNOYED you by criticizing your drinking?	no  · 3. Have you ever felt bad or GUILTY about your drinking?	no  · 4. Have you ever needed an "EYE OPENER", a drink first thing in the morning to steady your nerves or get rid of a hangover?	no     Tobacco Usage:  · Tobacco Usage: Former smoker  · Tobacco Type: cigarettes  · Number of Packs per Day: 1.5  · Number of yrs: 15  · Pack yrs: 22      FAMILY HISTORY:  Family history of heart disease (Father): palpitations      Allergies    No Known Allergies    Intolerances        HOME MEDICATIONS :     · 	fenoprofen 600 mg oral tablet: Last Dose Taken:  , 1 tab(s) orally 2 times a day  · 	omeprazole 40 mg oral delayed release capsule: Last Dose Taken:  , 1 cap(s) orally once a day  · 	citalopram 10 mg oral tablet: Last Dose Taken:  , 1 tab(s) orally once a day  · 	levETIRAcetam 750 mg oral tablet: Last Dose Taken:  , 1 tab(s) orally once a day (at bedtime)  · 	primidone 50 mg oral tablet: Last Dose Taken:  , 2 suppository(ies) orally once a day  · 	Vitamin D2 50,000 intl units (1.25 mg) oral capsule: Last Dose Taken:  , 1 cap(s) orally every 2 weeks  · 	aspirin 81 mg oral tablet: Last Dose Taken:  , 1 tab(s) orally once a day  · 	Fish Oil 1200 mg oral capsule: Last Dose Taken:  , 1 cap(s) orally once a day  · 	Calcium 600+D oral tablet: Last Dose Taken:  , 1 tab(s) orally once a day  · 	B Complex 50 oral tablet, extended release: Last Dose Taken:  , 1 tab(s) orally once a day  · 	Prolia 60 mg/mL subcutaneous solution: Last Dose Taken:  , 1 milliliter(s) subcutaneous every 6 months  · 	SUMAtriptan 50 mg oral tablet: Last Dose Taken:  , 1 tab(s) orally once, As Needed    REVIEW OF SYSTEMS:    CONSTITUTIONAL: No fever, weight loss, or fatigue  EYES: No eye pain, visual disturbances, or discharge  NECK: No pain or stiffness  RESPIRATORY: No cough, wheezing, chills or hemoptysis; No shortness of breath  CARDIOVASCULAR: No chest pain, palpitations, dizziness, or leg swelling  GASTROINTESTINAL: No abdominal or epigastric pain. No nausea, vomiting, or hematemesis; No diarrhea or constipation. No melena or hematochezia.  GENITOURINARY: No dysuria, frequency, hematuria, or incontinence  NEUROLOGICAL: No headaches, memory loss, loss of strength, numbness, or tremors  SKIN: No itching, burning, rashes, or lesions   LYMPH NODES: No enlarged glands  ENDOCRINE: No heat or cold intolerance; No hair loss  MUSCULOSKELETAL: L knee pain   PSYCHIATRIC: No depression, anxiety, mood swings, or difficulty sleeping  HEME/LYMPH: No easy bruising, or bleeding gums  ALLERGY AND IMMUNOLOGIC: No hives or eczema    MEDICATIONS  (STANDING):  acetaminophen   Tablet 975 milliGRAM(s) Oral every 8 hours  acetaminophen  IVPB. 1000 milliGRAM(s) IV Intermittent once  ascorbic acid 500 milliGRAM(s) Oral two times a day  ceFAZolin   IVPB 2000 milliGRAM(s) IV Intermittent <User Schedule>  citalopram 10 milliGRAM(s) Oral daily  docusate sodium 100 milliGRAM(s) Oral three times a day  ferrous    sulfate 325 milliGRAM(s) Oral three times a day with meals  folic acid 1 milliGRAM(s) Oral daily  ketorolac   Injectable 15 milliGRAM(s) IV Push every 6 hours  lactated ringers. 1000 milliLiter(s) (100 mL/Hr) IV Continuous <Continuous>  lactated ringers. 1000 milliLiter(s) (100 mL/Hr) IV Continuous <Continuous>  levETIRAcetam 750 milliGRAM(s) Oral at bedtime  multivitamin 1 Tablet(s) Oral daily  pantoprazole    Tablet 40 milliGRAM(s) Oral before breakfast  polyethylene glycol 3350 17 Gram(s) Oral daily  primidone 100 milliGRAM(s) Oral daily  vancomycin  IVPB 1000 milliGRAM(s) IV Intermittent <User Schedule>    MEDICATIONS  (PRN):  aluminum hydroxide/magnesium hydroxide/simethicone Suspension 30 milliLiter(s) Oral four times a day PRN Indigestion  diphenhydrAMINE   Capsule 25 milliGRAM(s) Oral at bedtime PRN Insomnia  fentaNYL    Injectable 50 MICROGram(s) IV Push every 5 minutes PRN Severe Pain  HYDROmorphone   Tablet 2 milliGRAM(s) Oral every 3 hours PRN Severe Pain (7 - 10)  HYDROmorphone  Injectable 0.5 milliGRAM(s) IV Push every 3 hours PRN breakthrough Pain  magnesium hydroxide Suspension 30 milliLiter(s) Oral daily PRN Constipation  ondansetron Injectable 4 milliGRAM(s) IV Push every 6 hours PRN Nausea and/or Vomiting  ondansetron Injectable 4 milliGRAM(s) IV Push once PRN Nausea and/or Vomiting  oxyCODONE    IR 5 milliGRAM(s) Oral every 3 hours PRN Mild Pain  oxyCODONE    IR 10 milliGRAM(s) Oral every 3 hours PRN Moderate Pain  senna 2 Tablet(s) Oral at bedtime PRN Constipation  SUMAtriptan 50 milliGRAM(s) Oral once PRN migraine      Vital Signs Last 24 Hrs  T(C): 36.8 (01 Feb 2018 13:31), Max: 37.1 (01 Feb 2018 08:54)  T(F): 98.2 (01 Feb 2018 13:31), Max: 98.8 (01 Feb 2018 08:54)  HR: 73 (01 Feb 2018 15:45) (71 - 87)  BP: 115/70 (01 Feb 2018 15:45) (107/65 - 153/73)  BP(mean): --  RR: 15 (01 Feb 2018 15:45) (11 - 20)  SpO2: 100% (01 Feb 2018 15:45) (97% - 100%)    PHYSICAL EXAM:    GENERAL: NAD, well-groomed, well-developed  HEAD:  Atraumatic, Normocephalic  EYES: EOMI, PERRLA, conjunctiva and sclera clear  NECK: Supple, No JVD, Normal thyroid  NERVOUS SYSTEM:  Alert & Oriented X3, Good concentration; Motor Strength 5/5 B/L upper and lower extremities; DTRs 2+ intact and symmetric  CHEST/LUNG: CTA  b/l,  no rales, rhonchi, wheezing, or rubs  HEART: Regular rate and rhythm; No murmurs, rubs, or gallops  ABDOMEN: Soft, Nontender, Nondistended; Bowel sounds present  EXTREMITIES:  2+ Peripheral Pulses, No clubbing, cyanosis, or edema , L knee ACE dressing + , clean and dry   LYMPH: No lymphadenopathy noted  SKIN: No rashes or lesions    LABS: Pending       RADIOLOGY & ADDITIONAL STUDIES:    < from: Xray Knee 1 or 2 Views, Left (02.01.18 @ 14:56) >   EXAM:  KNEE-LEFT                          PROCEDURE DATE:  02/01/2018          INTERPRETATION:  HISTORY: Postoperative  knee replacement.    Two views of the left knee are submitted.    Evaluation demonstrates the presence of a tricompartmental knee   replacement with the femoral, tibial and patellar components in proper   anatomic alignment. There is no fracture .       Impression:  Knee prosthetic components in proper anatomical alignment.          KELLY REESE M.D., ATTENDING RADIOLOGIST  This document has been electronically signed. Feb 1 2018  3:12PM        < end of copied text >

## 2018-02-01 NOTE — DISCHARGE NOTE ADULT - PLAN OF CARE
Improved function and pain control The patient will be seen in the office in 3 weeks for wound check. Sutures/Staples/Tape will be removed at that time. Patient may shower after post-op day #3 (2/4/18). The dressing is to be removed on post op day #9 (2/10/18). IF THE DRESSING BECOMES SOILED BEFORE THE REMOVAL DATE, CHANGE WITH A SIMILAR DRESSING. IF THE DRESSING BECOMES STAINED WITH DISCHARGE, CONTACT THE OFFICE FOR FURTHER DIRECTIONS. The patient will contact the office if the wound becomes red, has increasing pain, develops bleeding or discharge, an injury occurs, or has other concerns. The patient will continue PT consistent with total knee replacement. The patient will continue aspirin 325mg twice daily for 6 weeks for blood clot prevention. The patient will take OXYCODONE AND TYLENOL for pain control and titrate according to prescription and patient needs. The patient will take Senna-S while taking oxycodone to prevent narcotic associated constipation.  Additionally, increase water intake (drink at least 8 glasses of water daily) and try adding fiber to the diet by eating fruits, vegetables and foods that are rich in grains. If constipation is experienced, contact the medical/primary care provider to discuss further treatment options. The patient is FULL weight bearing. Elevation of the lower leg is recommended to reduce swelling.

## 2018-02-01 NOTE — DISCHARGE NOTE ADULT - MEDICATION SUMMARY - MEDICATIONS TO STOP TAKING
I will STOP taking the medications listed below when I get home from the hospital:  None I will STOP taking the medications listed below when I get home from the hospital:    fenoprofen 600 mg oral tablet  -- 1 tab(s) by mouth 2 times a day

## 2018-02-01 NOTE — PROGRESS NOTE ADULT - SUBJECTIVE AND OBJECTIVE BOX
Orthopedic PA Postop Note  Patient S/P LEFT TKA  Patient in bed comfortable   LEFT Leg  Dressing C/D/I   Pulse intact   Calf Soft NT  Dorsi/Plantar Flexion intact     Vital Signs Last 24 Hrs  T(C): 36.5 (01 Feb 2018 19:15), Max: 37.1 (01 Feb 2018 08:54)  T(F): 97.7 (01 Feb 2018 19:15), Max: 98.8 (01 Feb 2018 08:54)  HR: 77 (01 Feb 2018 20:00) (71 - 87)  BP: 117/67 (01 Feb 2018 20:00) (106/62 - 153/73)  BP(mean): --  RR: 17 (01 Feb 2018 20:00) (11 - 20)  SpO2: 97% (01 Feb 2018 20:00) (96% - 100%)    < from: Xray Knee 1 or 2 Views, Left (02.01.18 @ 14:56) >     EXAM:  KNEE-LEFT                          PROCEDURE DATE:  02/01/2018          INTERPRETATION:  HISTORY: Postoperative  knee replacement.    Two views of the left knee are submitted.    Evaluation demonstrates the presence of a tricompartmental knee   replacement with the femoral, tibial and patellar components in proper   anatomic alignment. There is no fracture .       Impression:  Knee prosthetic components in proper anatomical alignment.                      KELLY REESE M.D., ATTENDING RADIOLOGIST  This document has been electronically signed. Feb 1 2018  3:12PM        < end of copied text >      A/P: 77F S/P LEFT TKA  1. DVTP - ASA  2. Physical therapy - WBAT  3. Pain Control as clinically indicated

## 2018-02-01 NOTE — CONSULT NOTE ADULT - ASSESSMENT
Pt is a 77 y.o female with OA of bilateral knees for the past couple of years now s/p elective L TKA , POD # 0

## 2018-02-01 NOTE — DISCHARGE NOTE ADULT - CARE PROVIDER_API CALL
Rayshawn Chambers), Orthopaedic Surgery  [address] [place] [address]  Phone: [phone]  Fax: [phone]

## 2018-02-01 NOTE — DISCHARGE NOTE ADULT - HOSPITAL COURSE
The patient underwent a LEFT TOTAL KNEE REPLACEMENT on 2/1/18. The patient received antibiotics consistent with SCIP guidelines. The patient underwent the procedure and had no intra-operative complications. Post-operatively, the patient was seen by medicine and PT. The patient received ASPIRIN for DVTP. The patient received pain medications per orthopedic pain managment protocol and the pain was appropriately controlled. The patient did not have any post-operative medical complications. The patient was discharged in stable condition.

## 2018-02-01 NOTE — BRIEF OPERATIVE NOTE - PROCEDURE
<<-----Click on this checkbox to enter Procedure TKA (total knee arthroplasty)  02/01/2018    Active  MNETT

## 2018-02-01 NOTE — PHYSICAL THERAPY INITIAL EVALUATION ADULT - GENERAL OBSERVATIONS, REHAB EVAL
pt received supine on stretcher in PACU, + telemetry + IV + Venous Compression Boots, no c/o pain, pt agreeable to PT

## 2018-02-01 NOTE — PHYSICAL THERAPY INITIAL EVALUATION ADULT - RANGE OF MOTION EXAMINATION, REHAB EVAL
bilateral upper extremity ROM was WFL (within functional limits)/left knee lacking approx 10-15 degrees extension (in sitting) to approx 65-70 degrees flexion (in sitting)/Right LE ROM was WFL (within functional limits)

## 2018-02-01 NOTE — PHYSICAL THERAPY INITIAL EVALUATION ADULT - ADDITIONAL COMMENTS
Pt lives in a private home with her daughter and 2 grandchildren. No steps to enter 2 steps inside without handrails to pt's bedroom. Pt was independent PTA without assist device. Pt owns RW only.

## 2018-02-01 NOTE — DISCHARGE NOTE ADULT - CARE PLAN
Principal Discharge DX:	Unilateral primary osteoarthritis, left knee  Goal:	Improved function and pain control  Assessment and plan of treatment:	The patient will be seen in the office in 3 weeks for wound check. Sutures/Staples/Tape will be removed at that time. Patient may shower after post-op day #3 (2/4/18). The dressing is to be removed on post op day #9 (2/10/18). IF THE DRESSING BECOMES SOILED BEFORE THE REMOVAL DATE, CHANGE WITH A SIMILAR DRESSING. IF THE DRESSING BECOMES STAINED WITH DISCHARGE, CONTACT THE OFFICE FOR FURTHER DIRECTIONS. The patient will contact the office if the wound becomes red, has increasing pain, develops bleeding or discharge, an injury occurs, or has other concerns. The patient will continue PT consistent with total knee replacement. The patient will continue aspirin 325mg twice daily for 6 weeks for blood clot prevention. The patient will take OXYCODONE AND TYLENOL for pain control and titrate according to prescription and patient needs. The patient will take Senna-S while taking oxycodone to prevent narcotic associated constipation.  Additionally, increase water intake (drink at least 8 glasses of water daily) and try adding fiber to the diet by eating fruits, vegetables and foods that are rich in grains. If constipation is experienced, contact the medical/primary care provider to discuss further treatment options. The patient is FULL weight bearing. Elevation of the lower leg is recommended to reduce swelling.

## 2018-02-02 ENCOUNTER — TRANSCRIPTION ENCOUNTER (OUTPATIENT)
Age: 78
End: 2018-02-02

## 2018-02-02 LAB
ALBUMIN SERPL ELPH-MCNC: 3.3 G/DL — SIGNIFICANT CHANGE UP (ref 3.3–5.2)
ANION GAP SERPL CALC-SCNC: 9 MMOL/L — SIGNIFICANT CHANGE UP (ref 5–17)
BUN SERPL-MCNC: 14 MG/DL — SIGNIFICANT CHANGE UP (ref 8–20)
CALCIUM SERPL-MCNC: 7.5 MG/DL — LOW (ref 8.6–10.2)
CHLORIDE SERPL-SCNC: 98 MMOL/L — SIGNIFICANT CHANGE UP (ref 98–107)
CO2 SERPL-SCNC: 25 MMOL/L — SIGNIFICANT CHANGE UP (ref 22–29)
CREAT SERPL-MCNC: 0.34 MG/DL — LOW (ref 0.5–1.3)
GLUCOSE SERPL-MCNC: 111 MG/DL — SIGNIFICANT CHANGE UP (ref 70–115)
HCT VFR BLD CALC: 30.3 % — LOW (ref 37–47)
HGB BLD-MCNC: 9.8 G/DL — LOW (ref 12–16)
MCHC RBC-ENTMCNC: 29.9 PG — SIGNIFICANT CHANGE UP (ref 27–31)
MCHC RBC-ENTMCNC: 32.3 G/DL — SIGNIFICANT CHANGE UP (ref 32–36)
MCV RBC AUTO: 92.4 FL — SIGNIFICANT CHANGE UP (ref 81–99)
PLATELET # BLD AUTO: 186 K/UL — SIGNIFICANT CHANGE UP (ref 150–400)
POTASSIUM SERPL-MCNC: 4.4 MMOL/L — SIGNIFICANT CHANGE UP (ref 3.5–5.3)
POTASSIUM SERPL-SCNC: 4.4 MMOL/L — SIGNIFICANT CHANGE UP (ref 3.5–5.3)
PROT SERPL-MCNC: 5.3 G/DL — LOW (ref 6.6–8.7)
RBC # BLD: 3.28 M/UL — LOW (ref 4.4–5.2)
RBC # FLD: 12.5 % — SIGNIFICANT CHANGE UP (ref 11–15.6)
SODIUM SERPL-SCNC: 132 MMOL/L — LOW (ref 135–145)
WBC # BLD: 6.9 K/UL — SIGNIFICANT CHANGE UP (ref 4.8–10.8)
WBC # FLD AUTO: 6.9 K/UL — SIGNIFICANT CHANGE UP (ref 4.8–10.8)

## 2018-02-02 PROCEDURE — 99233 SBSQ HOSP IP/OBS HIGH 50: CPT

## 2018-02-02 RX ORDER — FERROUS SULFATE 325(65) MG
325 TABLET ORAL DAILY
Qty: 0 | Refills: 0 | Status: DISCONTINUED | OUTPATIENT
Start: 2018-02-02 | End: 2018-02-03

## 2018-02-02 RX ORDER — SODIUM CHLORIDE 9 MG/ML
1000 INJECTION, SOLUTION INTRAVENOUS
Qty: 0 | Refills: 0 | Status: DISCONTINUED | OUTPATIENT
Start: 2018-02-02 | End: 2018-02-03

## 2018-02-02 RX ORDER — SODIUM CHLORIDE 9 MG/ML
1000 INJECTION, SOLUTION INTRAVENOUS
Qty: 0 | Refills: 0 | Status: DISCONTINUED | OUTPATIENT
Start: 2018-02-02 | End: 2018-02-02

## 2018-02-02 RX ADMIN — HYDROMORPHONE HYDROCHLORIDE 2 MILLIGRAM(S): 2 INJECTION INTRAMUSCULAR; INTRAVENOUS; SUBCUTANEOUS at 08:28

## 2018-02-02 RX ADMIN — OXYCODONE HYDROCHLORIDE 10 MILLIGRAM(S): 5 TABLET ORAL at 19:44

## 2018-02-02 RX ADMIN — SUMATRIPTAN SUCCINATE 50 MILLIGRAM(S): 4 INJECTION, SOLUTION SUBCUTANEOUS at 05:45

## 2018-02-02 RX ADMIN — ONDANSETRON 4 MILLIGRAM(S): 8 TABLET, FILM COATED ORAL at 10:39

## 2018-02-02 RX ADMIN — Medication 325 MILLIGRAM(S): at 17:16

## 2018-02-02 RX ADMIN — SODIUM CHLORIDE 100 MILLILITER(S): 9 INJECTION, SOLUTION INTRAVENOUS at 08:28

## 2018-02-02 RX ADMIN — OXYCODONE HYDROCHLORIDE 10 MILLIGRAM(S): 5 TABLET ORAL at 20:44

## 2018-02-02 RX ADMIN — ONDANSETRON 4 MILLIGRAM(S): 8 TABLET, FILM COATED ORAL at 17:22

## 2018-02-02 RX ADMIN — HYDROMORPHONE HYDROCHLORIDE 0.5 MILLIGRAM(S): 2 INJECTION INTRAMUSCULAR; INTRAVENOUS; SUBCUTANEOUS at 21:37

## 2018-02-02 RX ADMIN — SUMATRIPTAN SUCCINATE 50 MILLIGRAM(S): 4 INJECTION, SOLUTION SUBCUTANEOUS at 04:11

## 2018-02-02 RX ADMIN — OXYCODONE HYDROCHLORIDE 10 MILLIGRAM(S): 5 TABLET ORAL at 13:30

## 2018-02-02 RX ADMIN — HYDROMORPHONE HYDROCHLORIDE 0.5 MILLIGRAM(S): 2 INJECTION INTRAMUSCULAR; INTRAVENOUS; SUBCUTANEOUS at 21:22

## 2018-02-02 RX ADMIN — OXYCODONE HYDROCHLORIDE 10 MILLIGRAM(S): 5 TABLET ORAL at 12:29

## 2018-02-02 RX ADMIN — Medication 202 MILLIGRAM(S): at 18:45

## 2018-02-02 RX ADMIN — HYDROMORPHONE HYDROCHLORIDE 0.5 MILLIGRAM(S): 2 INJECTION INTRAMUSCULAR; INTRAVENOUS; SUBCUTANEOUS at 10:39

## 2018-02-02 RX ADMIN — Medication 975 MILLIGRAM(S): at 21:22

## 2018-02-02 RX ADMIN — ONDANSETRON 4 MILLIGRAM(S): 8 TABLET, FILM COATED ORAL at 00:32

## 2018-02-02 RX ADMIN — Medication 325 MILLIGRAM(S): at 08:28

## 2018-02-02 RX ADMIN — HYDROMORPHONE HYDROCHLORIDE 2 MILLIGRAM(S): 2 INJECTION INTRAMUSCULAR; INTRAVENOUS; SUBCUTANEOUS at 09:15

## 2018-02-02 RX ADMIN — Medication 325 MILLIGRAM(S): at 04:11

## 2018-02-02 RX ADMIN — Medication 100 MILLIGRAM(S): at 12:29

## 2018-02-02 RX ADMIN — HYDROMORPHONE HYDROCHLORIDE 2 MILLIGRAM(S): 2 INJECTION INTRAMUSCULAR; INTRAVENOUS; SUBCUTANEOUS at 04:14

## 2018-02-02 RX ADMIN — Medication 500 MILLIGRAM(S): at 04:11

## 2018-02-02 RX ADMIN — Medication 100 MILLIGRAM(S): at 04:11

## 2018-02-02 RX ADMIN — Medication 975 MILLIGRAM(S): at 12:29

## 2018-02-02 RX ADMIN — HYDROMORPHONE HYDROCHLORIDE 0.5 MILLIGRAM(S): 2 INJECTION INTRAMUSCULAR; INTRAVENOUS; SUBCUTANEOUS at 11:00

## 2018-02-02 RX ADMIN — Medication 100 MILLIGRAM(S): at 03:03

## 2018-02-02 RX ADMIN — Medication 100 MILLIGRAM(S): at 21:22

## 2018-02-02 RX ADMIN — CITALOPRAM 10 MILLIGRAM(S): 10 TABLET, FILM COATED ORAL at 12:19

## 2018-02-02 RX ADMIN — LEVETIRACETAM 750 MILLIGRAM(S): 250 TABLET, FILM COATED ORAL at 21:22

## 2018-02-02 RX ADMIN — PANTOPRAZOLE SODIUM 40 MILLIGRAM(S): 20 TABLET, DELAYED RELEASE ORAL at 04:11

## 2018-02-02 RX ADMIN — PRIMIDONE 100 MILLIGRAM(S): 250 TABLET ORAL at 21:22

## 2018-02-02 RX ADMIN — HYDROMORPHONE HYDROCHLORIDE 2 MILLIGRAM(S): 2 INJECTION INTRAMUSCULAR; INTRAVENOUS; SUBCUTANEOUS at 05:14

## 2018-02-02 RX ADMIN — Medication 975 MILLIGRAM(S): at 04:11

## 2018-02-02 NOTE — PROGRESS NOTE ADULT - SUBJECTIVE AND OBJECTIVE BOX
Ortho Post Op Check    Name: ADEN GRANT    MR #: [id number]    Procedure: s/p Left TKA  Surgeon: Dr Chambers    PAST MEDICAL & SURGICAL HISTORY:  Breast mass, right  Baker's cyst of knee: bilateral  Tremor of both hands  Cataract  Osteoporosis  Migraines  Seizures: Last episode Oct 2013  Depression  GERD (gastroesophageal reflux disease)  Osteoarthritis  Right wrist fracture: may 2014  H/O right breast biopsy      Pt comfortable without complaints, pain controlled  Denies CP, SOB, N/V, numbness/tingling     General Exam:  Vital Signs Last 24 Hrs  T(C): 36.8 (02 Feb 2018 04:11), Max: 36.8 (01 Feb 2018 13:31)  T(F): 98.3 (02 Feb 2018 04:11), Max: 98.3 (02 Feb 2018 04:11)  HR: 81 (02 Feb 2018 04:11) (71 - 87)  BP: 113/71 (02 Feb 2018 04:11) (103/67 - 135/70)  BP(mean): --  RR: 17 (02 Feb 2018 04:11) (11 - 20)  SpO2: 97% (02 Feb 2018 04:11) (94% - 100%)    General: Pt Alert and oriented, NAD, controlled pain.  Left knee Dressings C/D/I. No bleeding.  Pulses: 2+ dorsalis pedis pulse. Cap refill < 2 sec. Calf soft and supple  Sensation: Grossly intact to light touch without deficit.  Motor: + EHL/FHL                          9.8    6.9   )-----------( 186      ( 02 Feb 2018 07:04 )             30.3   02 Feb 2018 07:04    132    |  98     |  14.0   ----------------------------<  111    4.4     |  25.0   |  0.34     Ca    7.5        02 Feb 2018 07:04        Post-op X-Ray:  EXAM:  KNEE-LEFT                          PROCEDURE DATE:  02/01/2018          INTERPRETATION:  HISTORY: Postoperative  knee replacement.    Two views of the left knee are submitted.    Evaluation demonstrates the presence of a tricompartmental knee   replacement with the femoral, tibial and patellar components in proper   anatomic alignment. There is no fracture .       Impression:  Knee prosthetic components in proper anatomical alignment.            KELLY REESE M.D., ATTENDING RADIOLOGIST  This document has been electronically signed. Feb 1 2018  3:12PM        MEDICATIONS  (STANDING):  acetaminophen   Tablet 975 milliGRAM(s) Oral every 8 hours  ascorbic acid 500 milliGRAM(s) Oral two times a day  aspirin enteric coated 325 milliGRAM(s) Oral two times a day  citalopram 10 milliGRAM(s) Oral daily  docusate sodium 100 milliGRAM(s) Oral three times a day  ferrous    sulfate 325 milliGRAM(s) Oral daily  folic acid 1 milliGRAM(s) Oral daily  lactated ringers. 1000 milliLiter(s) (100 mL/Hr) IV Continuous <Continuous>  levETIRAcetam 750 milliGRAM(s) Oral at bedtime  multivitamin 1 Tablet(s) Oral daily  pantoprazole    Tablet 40 milliGRAM(s) Oral before breakfast  polyethylene glycol 3350 17 Gram(s) Oral daily  primidone 100 milliGRAM(s) Oral daily    MEDICATIONS  (PRN):  aluminum hydroxide/magnesium hydroxide/simethicone Suspension 30 milliLiter(s) Oral four times a day PRN Indigestion  diphenhydrAMINE   Capsule 25 milliGRAM(s) Oral at bedtime PRN Insomnia  HYDROmorphone   Tablet 2 milliGRAM(s) Oral every 3 hours PRN Severe Pain (7 - 10)  HYDROmorphone  Injectable 0.5 milliGRAM(s) IV Push every 3 hours PRN breakthrough Pain  magnesium hydroxide Suspension 30 milliLiter(s) Oral daily PRN Constipation  ondansetron Injectable 4 milliGRAM(s) IV Push every 6 hours PRN Nausea and/or Vomiting  oxyCODONE    IR 5 milliGRAM(s) Oral every 3 hours PRN Mild Pain  oxyCODONE    IR 10 milliGRAM(s) Oral every 3 hours PRN Moderate Pain  senna 2 Tablet(s) Oral at bedtime PRN Constipation    A/P: 77yFemale POD#1 s/p Left TKA  - Stable  - Pain Control  - DVT ppx: ASA 325mg PO BID  - Post op abx: Vanco and Ancef given  - PT eval pending  - Weight bearing status: WBAT  - DC planning to home tomorrow

## 2018-02-02 NOTE — PROGRESS NOTE ADULT - ASSESSMENT
POD #1 s/p Spinal and IVAS/MAC. A&Ox3/VSS. Denies back pain or  numbness to LE's. Site CDI. No complications r/t yest anesthesia. No questions at this time.

## 2018-02-02 NOTE — PROGRESS NOTE ADULT - SUBJECTIVE AND OBJECTIVE BOX
Patient seen and examined . S/p L TKA   , POD # 1    CC : L knee pain         PAST MEDICAL & SURGICAL HISTORY:  Breast mass, right  Baker's cyst of knee: bilateral  Tremor of both hands  Cataract  Osteoporosis  Migraines  Seizures: Last episode Oct 2013  Depression  GERD (gastroesophageal reflux disease)  Osteoarthritis  Right wrist fracture: may 2014  H/O right breast biopsy      MEDICATIONS  (STANDING):  acetaminophen   Tablet 975 milliGRAM(s) Oral every 8 hours  ascorbic acid 500 milliGRAM(s) Oral two times a day  aspirin enteric coated 325 milliGRAM(s) Oral two times a day  citalopram 10 milliGRAM(s) Oral daily  docusate sodium 100 milliGRAM(s) Oral three times a day  ferrous    sulfate 325 milliGRAM(s) Oral three times a day with meals  folic acid 1 milliGRAM(s) Oral daily  lactated ringers. 1000 milliLiter(s) (100 mL/Hr) IV Continuous <Continuous>  levETIRAcetam 750 milliGRAM(s) Oral at bedtime  multivitamin 1 Tablet(s) Oral daily  pantoprazole    Tablet 40 milliGRAM(s) Oral before breakfast  polyethylene glycol 3350 17 Gram(s) Oral daily  primidone 100 milliGRAM(s) Oral daily    MEDICATIONS  (PRN):  aluminum hydroxide/magnesium hydroxide/simethicone Suspension 30 milliLiter(s) Oral four times a day PRN Indigestion  diphenhydrAMINE   Capsule 25 milliGRAM(s) Oral at bedtime PRN Insomnia  HYDROmorphone   Tablet 2 milliGRAM(s) Oral every 3 hours PRN Severe Pain (7 - 10)  HYDROmorphone  Injectable 0.5 milliGRAM(s) IV Push every 3 hours PRN breakthrough Pain  magnesium hydroxide Suspension 30 milliLiter(s) Oral daily PRN Constipation  ondansetron Injectable 4 milliGRAM(s) IV Push every 6 hours PRN Nausea and/or Vomiting  oxyCODONE    IR 5 milliGRAM(s) Oral every 3 hours PRN Mild Pain  oxyCODONE    IR 10 milliGRAM(s) Oral every 3 hours PRN Moderate Pain  senna 2 Tablet(s) Oral at bedtime PRN Constipation      LABS:                          9.8    6.9   )-----------( 186      ( 02 Feb 2018 07:04 )             30.3     02-02    132<L>  |  98  |  14.0  ----------------------------<  111  4.4   |  25.0  |  0.34<L>    Ca    7.5<L>      02 Feb 2018 07:04    RADIOLOGY & ADDITIONAL TESTS:    < from: Xray Knee 1 or 2 Views, Left (02.01.18 @ 14:56) >     EXAM:  KNEE-LEFT                          PROCEDURE DATE:  02/01/2018          INTERPRETATION:  HISTORY: Postoperative  knee replacement.    Two views of the left knee are submitted.    Evaluation demonstrates the presence of a tricompartmental knee   replacement with the femoral, tibial and patellar components in proper   anatomic alignment. There is no fracture .       Impression:  Knee prosthetic components in proper anatomical alignment.                      KELLY REESE M.D., ATTENDING RADIOLOGIST  This document has been electronically signed. Feb 1 2018  3:12PM    < end of copied text >        REVIEW OF SYSTEMS:    CONSTITUTIONAL: No fever, weight loss, or fatigue  EYES: No eye pain, visual disturbances, or discharge  ENMT:  No difficulty hearing, tinnitus, vertigo; No sinus or throat pain  NECK: No pain or stiffness  RESPIRATORY: No cough, wheezing, chills or hemoptysis; No shortness of breath  CARDIOVASCULAR: No chest pain, palpitations, dizziness, or leg swelling  GASTROINTESTINAL: No abdominal or epigastric pain. No nausea, vomiting, or hematemesis; No diarrhea or constipation. No melena or hematochezia.  GENITOURINARY: No dysuria, frequency, hematuria, or incontinence  NEUROLOGICAL: No headaches, memory loss, loss of strength, numbness, or tremors  SKIN: No itching, burning, rashes, or lesions   LYMPH NODES: No enlarged glands  ENDOCRINE: No heat or cold intolerance; No hair loss  MUSCULOSKELETAL: L knee pain   PSYCHIATRIC: No depression, anxiety, mood swings, or difficulty sleeping  HEME/LYMPH: No easy bruising, or bleeding gums  ALLERGY AND IMMUNOLOGIC: No hives or eczema    Vital Signs Last 24 Hrs  T(C): 36.8 (02 Feb 2018 04:11), Max: 36.8 (01 Feb 2018 13:31)  T(F): 98.3 (02 Feb 2018 04:11), Max: 98.3 (02 Feb 2018 04:11)  HR: 81 (02 Feb 2018 04:11) (71 - 87)  BP: 113/71 (02 Feb 2018 04:11) (103/67 - 135/70)  BP(mean): --  RR: 17 (02 Feb 2018 04:11) (11 - 20)  SpO2: 97% (02 Feb 2018 04:11) (94% - 100%)  PHYSICAL EXAM:    GENERAL: NAD, well-groomed, well-developed  HEAD:  Atraumatic, Normocephalic  EYES: EOMI, PERRLA, conjunctiva and sclera clear  NECK: Supple, No JVD, Normal thyroid  NERVOUS SYSTEM:  Alert & Oriented X3, no focal deficit  CHEST/LUNG: CTA b/l ,  no  rales, rhonchi, wheezing, or rubs  HEART: Regular rate and rhythm; No murmurs, rubs, or gallops  ABDOMEN: Soft, Nontender, Nondistended; Bowel sounds present  EXTREMITIES:  2+ Peripheral Pulses, No clubbing, cyanosis, or edema , L knee dressing + , clean and dry   LYMPH: No lymphadenopathy noted  SKIN: No rashes or lesions Patient seen and examined . S/p L TKA   , POD # 1. Painn well controlled , feels nauseated , vomited last night once , no other complaints .    CC : L knee pain  , nausea , episode of vomiting x1 .        PAST MEDICAL & SURGICAL HISTORY:  Breast mass, right  Baker's cyst of knee: bilateral  Tremor of both hands  Cataract  Osteoporosis  Migraines  Seizures: Last episode Oct 2013  Depression  GERD (gastroesophageal reflux disease)  Osteoarthritis  Right wrist fracture: may 2014  H/O right breast biopsy      MEDICATIONS  (STANDING):  acetaminophen   Tablet 975 milliGRAM(s) Oral every 8 hours  ascorbic acid 500 milliGRAM(s) Oral two times a day  aspirin enteric coated 325 milliGRAM(s) Oral two times a day  citalopram 10 milliGRAM(s) Oral daily  docusate sodium 100 milliGRAM(s) Oral three times a day  ferrous    sulfate 325 milliGRAM(s) Oral three times a day with meals  folic acid 1 milliGRAM(s) Oral daily  lactated ringers. 1000 milliLiter(s) (100 mL/Hr) IV Continuous <Continuous>  levETIRAcetam 750 milliGRAM(s) Oral at bedtime  multivitamin 1 Tablet(s) Oral daily  pantoprazole    Tablet 40 milliGRAM(s) Oral before breakfast  polyethylene glycol 3350 17 Gram(s) Oral daily  primidone 100 milliGRAM(s) Oral daily    MEDICATIONS  (PRN):  aluminum hydroxide/magnesium hydroxide/simethicone Suspension 30 milliLiter(s) Oral four times a day PRN Indigestion  diphenhydrAMINE   Capsule 25 milliGRAM(s) Oral at bedtime PRN Insomnia  HYDROmorphone   Tablet 2 milliGRAM(s) Oral every 3 hours PRN Severe Pain (7 - 10)  HYDROmorphone  Injectable 0.5 milliGRAM(s) IV Push every 3 hours PRN breakthrough Pain  magnesium hydroxide Suspension 30 milliLiter(s) Oral daily PRN Constipation  ondansetron Injectable 4 milliGRAM(s) IV Push every 6 hours PRN Nausea and/or Vomiting  oxyCODONE    IR 5 milliGRAM(s) Oral every 3 hours PRN Mild Pain  oxyCODONE    IR 10 milliGRAM(s) Oral every 3 hours PRN Moderate Pain  senna 2 Tablet(s) Oral at bedtime PRN Constipation      LABS:                          9.8    6.9   )-----------( 186      ( 02 Feb 2018 07:04 )             30.3     02-02    132<L>  |  98  |  14.0  ----------------------------<  111  4.4   |  25.0  |  0.34<L>    Ca    7.5<L>      02 Feb 2018 07:04    RADIOLOGY & ADDITIONAL TESTS:    < from: Xray Knee 1 or 2 Views, Left (02.01.18 @ 14:56) >     EXAM:  KNEE-LEFT                          PROCEDURE DATE:  02/01/2018          INTERPRETATION:  HISTORY: Postoperative  knee replacement.    Two views of the left knee are submitted.    Evaluation demonstrates the presence of a tricompartmental knee   replacement with the femoral, tibial and patellar components in proper   anatomic alignment. There is no fracture .       Impression:  Knee prosthetic components in proper anatomical alignment.                      KELLY REESE M.D., ATTENDING RADIOLOGIST  This document has been electronically signed. Feb 1 2018  3:12PM    < end of copied text >        REVIEW OF SYSTEMS:    CONSTITUTIONAL: No fever, weight loss, or fatigue  EYES: No eye pain, visual disturbances, or discharge  ENMT:  No difficulty hearing, tinnitus, vertigo; No sinus or throat pain  NECK: No pain or stiffness  RESPIRATORY: No cough, wheezing, chills or hemoptysis; No shortness of breath  CARDIOVASCULAR: No chest pain, palpitations, dizziness, or leg swelling  GASTROINTESTINAL: No abdominal or epigastric pain. No nausea, vomiting, or hematemesis; No diarrhea or constipation. No melena or hematochezia.  GENITOURINARY: No dysuria, frequency, hematuria, or incontinence  NEUROLOGICAL: No headaches, memory loss, loss of strength, numbness, or tremors  SKIN: No itching, burning, rashes, or lesions   LYMPH NODES: No enlarged glands  ENDOCRINE: No heat or cold intolerance; No hair loss  MUSCULOSKELETAL: L knee pain well controlled   PSYCHIATRIC: No depression, anxiety, mood swings, or difficulty sleeping  HEME/LYMPH: No easy bruising, or bleeding gums  ALLERGY AND IMMUNOLOGIC: No hives or eczema    Vital Signs Last 24 Hrs  T(C): 36.8 (02 Feb 2018 04:11), Max: 36.8 (01 Feb 2018 13:31)  T(F): 98.3 (02 Feb 2018 04:11), Max: 98.3 (02 Feb 2018 04:11)  HR: 81 (02 Feb 2018 04:11) (71 - 87)  BP: 113/71 (02 Feb 2018 04:11) (103/67 - 135/70)  BP(mean): --  RR: 17 (02 Feb 2018 04:11) (11 - 20)  SpO2: 97% (02 Feb 2018 04:11) (94% - 100%)  PHYSICAL EXAM:    GENERAL: NAD, well-groomed, well-developed  HEAD:  Atraumatic, Normocephalic  EYES: EOMI, PERRLA, conjunctiva and sclera clear  NECK: Supple, No JVD, Normal thyroid  NERVOUS SYSTEM:  Alert & Oriented X3, no focal deficit  CHEST/LUNG: CTA b/l ,  no  rales, rhonchi, wheezing, or rubs  HEART: Regular rate and rhythm; No murmurs, rubs, or gallops  ABDOMEN: Soft, Nontender, Nondistended; Bowel sounds present  EXTREMITIES:  2+ Peripheral Pulses, No clubbing, cyanosis, or edema , L knee dressing + , clean and dry   LYMPH: No lymphadenopathy noted  SKIN: No rashes or lesions

## 2018-02-02 NOTE — PROGRESS NOTE ADULT - ASSESSMENT
Pt is a 77 y.o female with OA of bilateral knees for the past couple of years now s/p elective L TKA , POD # 1     Problem/Recommendation - 1:  Problem: Unilateral primary osteoarthritis, left knee. Recommendation: S/P L TKA.     Problem/Recommendation - 2:  ·  Problem: Status post left knee replacement.  Recommendation: PT/OT/pain mgmt  DVT prophylaxis- as per ortho  Abx as per SCIP  Incentive spirometry  Prophylaxis of opioid  induced constipation.      Problem/Recommendation - 3:  ·  Problem: Seizures.  Recommendation: history of seizure , last episode 2013 - continue home medications.      Problem/Recommendation - 4:  ·  Problem: Tremor of both hands.  Recommendation: intermittent , does follow up with neurologist.      Problem/Recommendation - 5:  ·  Problem: Migraines.  Recommendation: continue home meds.      Problem/Recommendation - 6:  Problem: Depression, unspecified depression type. Recommendation: continue home meds.     Problem/Recommendation - 7:  Problem: Gastroesophageal reflux disease without esophagitis. Recommendation: continue PPI.     Problem/Recommendation - 8:  Problem: Prophylactic measure. Recommendation: DVT prophylaxis - on ASA as per ortho.     Problem/Recommendation - 9:  Problem: Hypocalcemia . Recommendation: will check protein level and r/o pseudo hypocalcemia , follow labs in am     Problem /Plan -10: Anemia - likely ABLA as a cause of postoperative anemia - patient is asymptomatic , follow CBC lorraine m     Problem / Plan -11: Postoperative hyponatremia - continue ivf , follow BMP in am Pt is a 77 y.o female with OA of bilateral knees for the past couple of years now s/p elective L TKA , POD # 1     Problem/Recommendation - 1:  Problem: Unilateral primary osteoarthritis, left knee. Recommendation: S/P L TKA.     Problem/Recommendation - 2:  ·  Problem: Status post left knee replacement.  Recommendation: PT/OT/pain mgmt  DVT prophylaxis- as per ortho  Abx as per SCIP  Incentive spirometry  Prophylaxis of opioid  induced constipation.      Problem/Recommendation - 3:  ·  Problem: Seizures.  Recommendation: history of seizure , last episode 2013 - continue home medications.      Problem/Recommendation - 4:  ·  Problem: Tremor of both hands intermittent .  Recommendation: intermittent , does follow up with neurologist.      Problem/Recommendation - 5:  ·  Problem: Migraines.  Recommendation: continue home meds.      Problem/Recommendation - 6:  Problem: Depression, unspecified depression type. Recommendation: continue home meds.     Problem/Recommendation - 7:  Problem: Gastroesophageal reflux disease without esophagitis. Recommendation: continue PPI.     Problem/Recommendation - 8:  Problem: Prophylactic measure. Recommendation: DVT prophylaxis - on ASA as per ortho.     Problem/Recommendation - 9:  Problem: Hypocalcemia . Recommendation: will check protein level and r/o pseudo hypocalcemia , follow labs in am     Problem /Plan -10: Anemia - likely ABLA as a cause of postoperative anemia - patient is asymptomatic , follow CBC lorraine m     Problem / Plan -11: Postoperative hyponatremia - continue ivf , follow BMP in am

## 2018-02-02 NOTE — PROGRESS NOTE ADULT - SUBJECTIVE AND OBJECTIVE BOX
Anesthesia Post Op Note:      INTERVAL HPI/OVERNIGHT EVENTS:    MEDICATIONS  (STANDING):  acetaminophen   Tablet 975 milliGRAM(s) Oral every 8 hours  ascorbic acid 500 milliGRAM(s) Oral two times a day  aspirin enteric coated 325 milliGRAM(s) Oral two times a day  citalopram 10 milliGRAM(s) Oral daily  docusate sodium 100 milliGRAM(s) Oral three times a day  ferrous    sulfate 325 milliGRAM(s) Oral daily  folic acid 1 milliGRAM(s) Oral daily  lactated ringers. 1000 milliLiter(s) (100 mL/Hr) IV Continuous <Continuous>  levETIRAcetam 750 milliGRAM(s) Oral at bedtime  multivitamin 1 Tablet(s) Oral daily  pantoprazole    Tablet 40 milliGRAM(s) Oral before breakfast  polyethylene glycol 3350 17 Gram(s) Oral daily  primidone 100 milliGRAM(s) Oral daily    MEDICATIONS  (PRN):  aluminum hydroxide/magnesium hydroxide/simethicone Suspension 30 milliLiter(s) Oral four times a day PRN Indigestion  diphenhydrAMINE   Capsule 25 milliGRAM(s) Oral at bedtime PRN Insomnia  HYDROmorphone   Tablet 2 milliGRAM(s) Oral every 3 hours PRN Severe Pain (7 - 10)  HYDROmorphone  Injectable 0.5 milliGRAM(s) IV Push every 3 hours PRN breakthrough Pain  magnesium hydroxide Suspension 30 milliLiter(s) Oral daily PRN Constipation  ondansetron Injectable 4 milliGRAM(s) IV Push every 6 hours PRN Nausea and/or Vomiting  oxyCODONE    IR 5 milliGRAM(s) Oral every 3 hours PRN Mild Pain  oxyCODONE    IR 10 milliGRAM(s) Oral every 3 hours PRN Moderate Pain  senna 2 Tablet(s) Oral at bedtime PRN Constipation      Allergies    No Known Allergies    Intolerances        REVIEW OF SYSTEMS:    CONSTITUTIONAL: No fever, weight loss, or fatigue  EYES: No eye pain, visual disturbances, or discharge  ENMT:  No difficulty hearing, tinnitus, vertigo; No sinus or throat pain  NECK: No pain or stiffness  BREASTS: No pain, masses, or nipple discharge  RESPIRATORY: No cough, wheezing, chills or hemoptysis; No shortness of breath  CARDIOVASCULAR: No chest pain, palpitations, dizziness, or leg swelling  GASTROINTESTINAL: No abdominal or epigastric pain. No nausea, vomiting, or hematemesis; No diarrhea or constipation. No melena or hematochezia.  GENITOURINARY: No dysuria, frequency, hematuria, or incontinence  NEUROLOGICAL: No headaches, memory loss, loss of strength, numbness, or tremors  SKIN: No itching, burning, rashes, or lesions   LYMPH NODES: No enlarged glands  ENDOCRINE: No heat or cold intolerance; No hair loss  MUSCULOSKELETAL: No joint pain or swelling; No muscle, back, or extremity pain  PSYCHIATRIC: No depression, anxiety, mood swings, or difficulty sleeping  HEME/LYMPH: No easy bruising, or bleeding gums  ALLERY AND IMMUNOLOGIC: No hives or eczema    Vital Signs Last 24 Hrs  T(C): 36.8 (02 Feb 2018 04:11), Max: 36.8 (01 Feb 2018 13:31)  T(F): 98.3 (02 Feb 2018 04:11), Max: 98.3 (02 Feb 2018 04:11)  HR: 84 (02 Feb 2018 10:36) (71 - 87)  BP: 167/96 (02 Feb 2018 10:36) (103/67 - 167/96)  BP(mean): --  RR: 16 (02 Feb 2018 10:36) (11 - 20)  SpO2: 96% (02 Feb 2018 10:36) (94% - 100%)        PHYSICAL EXAM:    GENERAL: NAD, well-groomed, well-developed  HEAD:  Atraumatic, Normocephalic  EYES: EOMI, PERRLA, conjunctiva and sclera clear  ENMT: No tonsillar erythema, exudates, or enlargement; Moist mucous membranes, Good dentition, No lesions  NECK: Supple, No JVD, Normal thyroid  NERVOUS SYSTEM:  Alert & Oriented X3, Good concentration; Motor Strength 5/5 B/L upper and lower extremities; DTRs 2+ intact and symmetric  CHEST/LUNG: Clear to percussion bilaterally; No rales, rhonchi, wheezing, or rubs  HEART: Regular rate and rhythm; No murmurs, rubs, or gallops  ABDOMEN: Soft, Nontender, Nondistended; Bowel sounds present  EXTREMITIES:  2+ Peripheral Pulses, No clubbing, cyanosis, or edema  LYMPH: No lymphadenopathy noted  SKIN: No rashes or lesions      LABS:                        9.8    6.9   )-----------( 186      ( 02 Feb 2018 07:04 )             30.3     02-02    132<L>  |  98  |  14.0  ----------------------------<  111  4.4   |  25.0  |  0.34<L>    Ca    7.5<L>      02 Feb 2018 07:04    TPro  5.3<L>  /  Alb  3.3  /  TBili  x   /  DBili  x   /  AST  x   /  ALT  x   /  AlkPhos  x   02-02          RADIOLOGY & ADDITIONAL TESTS:

## 2018-02-03 VITALS
DIASTOLIC BLOOD PRESSURE: 89 MMHG | OXYGEN SATURATION: 96 % | SYSTOLIC BLOOD PRESSURE: 144 MMHG | HEART RATE: 93 BPM | RESPIRATION RATE: 18 BRPM | TEMPERATURE: 99 F

## 2018-02-03 DIAGNOSIS — E87.1 HYPO-OSMOLALITY AND HYPONATREMIA: ICD-10-CM

## 2018-02-03 DIAGNOSIS — E87.6 HYPOKALEMIA: ICD-10-CM

## 2018-02-03 DIAGNOSIS — D50.0 IRON DEFICIENCY ANEMIA SECONDARY TO BLOOD LOSS (CHRONIC): ICD-10-CM

## 2018-02-03 LAB
ANION GAP SERPL CALC-SCNC: 13 MMOL/L — SIGNIFICANT CHANGE UP (ref 5–17)
BUN SERPL-MCNC: 14 MG/DL — SIGNIFICANT CHANGE UP (ref 8–20)
CALCIUM SERPL-MCNC: 7.7 MG/DL — LOW (ref 8.6–10.2)
CHLORIDE SERPL-SCNC: 90 MMOL/L — LOW (ref 98–107)
CO2 SERPL-SCNC: 24 MMOL/L — SIGNIFICANT CHANGE UP (ref 22–29)
CREAT SERPL-MCNC: 0.3 MG/DL — LOW (ref 0.5–1.3)
GLUCOSE SERPL-MCNC: 98 MG/DL — SIGNIFICANT CHANGE UP (ref 70–115)
HCT VFR BLD CALC: 27.3 % — LOW (ref 37–47)
HGB BLD-MCNC: 9.1 G/DL — LOW (ref 12–16)
MCHC RBC-ENTMCNC: 30 PG — SIGNIFICANT CHANGE UP (ref 27–31)
MCHC RBC-ENTMCNC: 33.3 G/DL — SIGNIFICANT CHANGE UP (ref 32–36)
MCV RBC AUTO: 90.1 FL — SIGNIFICANT CHANGE UP (ref 81–99)
PLATELET # BLD AUTO: 162 K/UL — SIGNIFICANT CHANGE UP (ref 150–400)
POTASSIUM SERPL-MCNC: 3.4 MMOL/L — LOW (ref 3.5–5.3)
POTASSIUM SERPL-SCNC: 3.4 MMOL/L — LOW (ref 3.5–5.3)
RBC # BLD: 3.03 M/UL — LOW (ref 4.4–5.2)
RBC # FLD: 11.9 % — SIGNIFICANT CHANGE UP (ref 11–15.6)
SODIUM SERPL-SCNC: 127 MMOL/L — LOW (ref 135–145)
WBC # BLD: 5 K/UL — SIGNIFICANT CHANGE UP (ref 4.8–10.8)
WBC # FLD AUTO: 5 K/UL — SIGNIFICANT CHANGE UP (ref 4.8–10.8)

## 2018-02-03 PROCEDURE — C1713: CPT

## 2018-02-03 PROCEDURE — 84155 ASSAY OF PROTEIN SERUM: CPT

## 2018-02-03 PROCEDURE — 97163 PT EVAL HIGH COMPLEX 45 MIN: CPT

## 2018-02-03 PROCEDURE — 97116 GAIT TRAINING THERAPY: CPT

## 2018-02-03 PROCEDURE — 97110 THERAPEUTIC EXERCISES: CPT

## 2018-02-03 PROCEDURE — 86901 BLOOD TYPING SEROLOGIC RH(D): CPT

## 2018-02-03 PROCEDURE — 86850 RBC ANTIBODY SCREEN: CPT

## 2018-02-03 PROCEDURE — 82040 ASSAY OF SERUM ALBUMIN: CPT

## 2018-02-03 PROCEDURE — 36415 COLL VENOUS BLD VENIPUNCTURE: CPT

## 2018-02-03 PROCEDURE — 85027 COMPLETE CBC AUTOMATED: CPT

## 2018-02-03 PROCEDURE — 80048 BASIC METABOLIC PNL TOTAL CA: CPT

## 2018-02-03 PROCEDURE — 73560 X-RAY EXAM OF KNEE 1 OR 2: CPT

## 2018-02-03 PROCEDURE — 88305 TISSUE EXAM BY PATHOLOGIST: CPT

## 2018-02-03 PROCEDURE — C1776: CPT

## 2018-02-03 PROCEDURE — 88311 DECALCIFY TISSUE: CPT

## 2018-02-03 PROCEDURE — 86900 BLOOD TYPING SEROLOGIC ABO: CPT

## 2018-02-03 PROCEDURE — 99233 SBSQ HOSP IP/OBS HIGH 50: CPT

## 2018-02-03 RX ORDER — SENNOSIDES/DOCUSATE SODIUM 8.6MG-50MG
2 TABLET ORAL
Qty: 14 | Refills: 0 | OUTPATIENT
Start: 2018-02-03

## 2018-02-03 RX ORDER — FENOPROFEN CALCIUM 600 MG
1 TABLET ORAL
Qty: 0 | Refills: 0 | COMMUNITY

## 2018-02-03 RX ORDER — ASPIRIN/CALCIUM CARB/MAGNESIUM 324 MG
1 TABLET ORAL
Qty: 0 | Refills: 0 | COMMUNITY

## 2018-02-03 RX ORDER — ASPIRIN/CALCIUM CARB/MAGNESIUM 324 MG
1 TABLET ORAL
Qty: 90 | Refills: 0 | OUTPATIENT
Start: 2018-02-03

## 2018-02-03 RX ORDER — OXYCODONE HYDROCHLORIDE 5 MG/1
1 TABLET ORAL
Qty: 56 | Refills: 0 | OUTPATIENT
Start: 2018-02-03

## 2018-02-03 RX ORDER — SUMATRIPTAN SUCCINATE 4 MG/.5ML
50 INJECTION, SOLUTION SUBCUTANEOUS ONCE
Qty: 0 | Refills: 0 | Status: COMPLETED | OUTPATIENT
Start: 2018-02-03 | End: 2018-02-03

## 2018-02-03 RX ORDER — POTASSIUM CHLORIDE 20 MEQ
40 PACKET (EA) ORAL ONCE
Qty: 0 | Refills: 0 | Status: COMPLETED | OUTPATIENT
Start: 2018-02-03 | End: 2018-02-03

## 2018-02-03 RX ADMIN — Medication 975 MILLIGRAM(S): at 13:08

## 2018-02-03 RX ADMIN — SUMATRIPTAN SUCCINATE 50 MILLIGRAM(S): 4 INJECTION, SOLUTION SUBCUTANEOUS at 06:06

## 2018-02-03 RX ADMIN — Medication 975 MILLIGRAM(S): at 05:39

## 2018-02-03 RX ADMIN — CITALOPRAM 10 MILLIGRAM(S): 10 TABLET, FILM COATED ORAL at 13:08

## 2018-02-03 RX ADMIN — Medication 40 MILLIEQUIVALENT(S): at 13:08

## 2018-02-03 RX ADMIN — OXYCODONE HYDROCHLORIDE 10 MILLIGRAM(S): 5 TABLET ORAL at 10:27

## 2018-02-03 RX ADMIN — SODIUM CHLORIDE 100 MILLILITER(S): 9 INJECTION, SOLUTION INTRAVENOUS at 03:16

## 2018-02-03 RX ADMIN — Medication 100 MILLIGRAM(S): at 13:08

## 2018-02-03 RX ADMIN — ONDANSETRON 4 MILLIGRAM(S): 8 TABLET, FILM COATED ORAL at 09:57

## 2018-02-03 RX ADMIN — Medication 1 MILLIGRAM(S): at 13:12

## 2018-02-03 RX ADMIN — OXYCODONE HYDROCHLORIDE 10 MILLIGRAM(S): 5 TABLET ORAL at 09:57

## 2018-02-03 RX ADMIN — POLYETHYLENE GLYCOL 3350 17 GRAM(S): 17 POWDER, FOR SOLUTION ORAL at 13:07

## 2018-02-03 RX ADMIN — SUMATRIPTAN SUCCINATE 50 MILLIGRAM(S): 4 INJECTION, SOLUTION SUBCUTANEOUS at 06:04

## 2018-02-03 RX ADMIN — Medication 325 MILLIGRAM(S): at 13:08

## 2018-02-03 RX ADMIN — Medication 325 MILLIGRAM(S): at 05:39

## 2018-02-03 RX ADMIN — Medication 1 TABLET(S): at 13:08

## 2018-02-03 NOTE — PROGRESS NOTE ADULT - PROBLEM SELECTOR PLAN 3
on aspirin 325 mg bid per primary team , protocol post ortho surgery  bowel regimen to prevent post op constipation due to narcotics stable , asymptomatic ,  no indication for transfusion

## 2018-02-03 NOTE — PROGRESS NOTE ADULT - PROBLEM SELECTOR PLAN 4
on aspirin 325 mg bid per primary team , protocol post ortho surgery  bowel regimen to prevent post op constipation due to narcotics

## 2018-02-03 NOTE — PROGRESS NOTE ADULT - SUBJECTIVE AND OBJECTIVE BOX
Patient seen and examined . S/p L TKA   c/o pain after PT this am , dizziness slight during PT now resting in the chair asymptomatic , no other complaints     no overnight events reported         Vital Signs Last 24 Hrs  T(C): 37.3 (03 Feb 2018 09:37), Max: 37.3 (03 Feb 2018 09:37)  T(F): 99.2 (03 Feb 2018 09:37), Max: 99.2 (03 Feb 2018 09:37)  HR: 93 (03 Feb 2018 09:37) (82 - 93)  BP: 144/89 (03 Feb 2018 09:37) (106/66 - 144/89)  BP(mean): --  RR: 18 (03 Feb 2018 09:37) (18 - 18)  SpO2: 96% (03 Feb 2018 09:37) (90% - 96%)  GENERAL: NAD, well developed   CHEST/LUNG: CTA b/l ,  no  rales, rhonchi, wheezing, or rubs  HEART: Regular rate and rhythm; No murmurs, rubs, or gallops  ABDOMEN: Soft, Nontender, Nondistended; Bowel sounds present  EXTREMITIES:  no pretibial  edema ,left knee dressing intact and some swelling around knee wound                           9.1    5.0   )-----------( 162      ( 03 Feb 2018 06:25 )             27.3   02-03    127<L>  |  90<L>  |  14.0  ----------------------------<  98  3.4<L>   |  24.0  |  0.30<L>    Ca    7.7<L>      03 Feb 2018 06:25    TPro  5.3<L>  /  Alb  3.3  /  TBili  x   /  DBili  x   /  AST  x   /  ALT  x   /  AlkPhos  x   02-02

## 2018-02-03 NOTE — PROGRESS NOTE ADULT - SUBJECTIVE AND OBJECTIVE BOX
Patient seen and examined at bedside. Comfortable in bed, pain controlled. Denies fever/chills, SOB/chest pain, abdominal pain, numbness/tingling. no complaints.    Vital Signs Last 24 Hrs  T(C): 37.2 (02 Feb 2018 23:27), Max: 37.2 (02 Feb 2018 23:27)  T(F): 99 (02 Feb 2018 23:27), Max: 99 (02 Feb 2018 23:27)  HR: 88 (02 Feb 2018 23:27) (78 - 89)  BP: 124/67 (02 Feb 2018 23:27) (106/66 - 167/96)  BP(mean): --  RR: 18 (02 Feb 2018 23:27) (16 - 18)  SpO2: 90% (02 Feb 2018 23:27) (90% - 96%)    LLE: Ace bandage dressing C/D/I. Removed. prineo in tact, no erythema no active drainage. SILT. + dorsi/plantarflexion. Ext warm, cap refill brisk. Calf soft NT B/L.                          9.1    5.0   )-----------( 162      ( 03 Feb 2018 06:25 )             27.3     02-03    127<L>  |  90<L>  |  14.0  ----------------------------<  98  3.4<L>   |  24.0  |  0.30<L>    Ca    7.7<L>      03 Feb 2018 06:25    TPro  5.3<L>  /  Alb  3.3  /  TBili  x   /  DBili  x   /  AST  x   /  ALT  x   /  AlkPhos  x   02-02    A/P: 77 y.o F s/p left TKA POD #2  - WBAT  - DVTP  - dressing changed  - d/c planning - home today if cleared by PT/medicine

## 2018-02-03 NOTE — PROGRESS NOTE ADULT - ASSESSMENT
Pt is a 77 y.o female with OA of bilateral knees for the past couple of years now s/p elective L TKA , post op hyponatremia and blood loss anemia

## 2018-02-09 ENCOUNTER — OTHER (OUTPATIENT)
Age: 78
End: 2018-02-09

## 2018-02-09 LAB — SURGICAL PATHOLOGY FINAL REPORT - CH: SIGNIFICANT CHANGE UP

## 2018-02-10 ENCOUNTER — MEDICATION RENEWAL (OUTPATIENT)
Age: 78
End: 2018-02-10

## 2018-02-14 ENCOUNTER — APPOINTMENT (OUTPATIENT)
Dept: DERMATOLOGY | Facility: CLINIC | Age: 78
End: 2018-02-14

## 2018-02-23 ENCOUNTER — APPOINTMENT (OUTPATIENT)
Dept: ORTHOPEDIC SURGERY | Facility: CLINIC | Age: 78
End: 2018-02-23
Payer: MEDICARE

## 2018-02-23 VITALS
HEART RATE: 86 BPM | DIASTOLIC BLOOD PRESSURE: 93 MMHG | SYSTOLIC BLOOD PRESSURE: 172 MMHG | BODY MASS INDEX: 25.61 KG/M2 | TEMPERATURE: 98.1 F | HEIGHT: 64 IN | WEIGHT: 150 LBS

## 2018-02-23 DIAGNOSIS — M17.11 UNILATERAL PRIMARY OSTEOARTHRITIS, RIGHT KNEE: ICD-10-CM

## 2018-02-23 PROCEDURE — 99024 POSTOP FOLLOW-UP VISIT: CPT

## 2018-02-23 PROCEDURE — 99214 OFFICE O/P EST MOD 30 MIN: CPT | Mod: 24

## 2018-02-23 PROCEDURE — 73562 X-RAY EXAM OF KNEE 3: CPT | Mod: LT

## 2018-03-08 ENCOUNTER — LABORATORY RESULT (OUTPATIENT)
Age: 78
End: 2018-03-08

## 2018-03-09 ENCOUNTER — APPOINTMENT (OUTPATIENT)
Dept: RHEUMATOLOGY | Facility: CLINIC | Age: 78
End: 2018-03-09
Payer: MEDICARE

## 2018-03-09 VITALS
HEART RATE: 83 BPM | OXYGEN SATURATION: 98 % | RESPIRATION RATE: 17 BRPM | TEMPERATURE: 98.7 F | DIASTOLIC BLOOD PRESSURE: 78 MMHG | SYSTOLIC BLOOD PRESSURE: 110 MMHG

## 2018-03-09 DIAGNOSIS — G25.81 RESTLESS LEGS SYNDROME: ICD-10-CM

## 2018-03-09 DIAGNOSIS — M71.22 SYNOVIAL CYST OF POPLITEAL SPACE [BAKER], RIGHT KNEE: ICD-10-CM

## 2018-03-09 DIAGNOSIS — M71.21 SYNOVIAL CYST OF POPLITEAL SPACE [BAKER], RIGHT KNEE: ICD-10-CM

## 2018-03-09 PROCEDURE — 36415 COLL VENOUS BLD VENIPUNCTURE: CPT

## 2018-03-09 PROCEDURE — 99214 OFFICE O/P EST MOD 30 MIN: CPT | Mod: 25

## 2018-03-09 RX ORDER — OXYCODONE 5 MG/1
5 TABLET ORAL
Qty: 56 | Refills: 0 | Status: DISCONTINUED | COMMUNITY
Start: 2018-02-03 | End: 2018-03-09

## 2018-03-09 RX ORDER — CIPROFLOXACIN HYDROCHLORIDE 500 MG/1
500 TABLET, FILM COATED ORAL
Qty: 6 | Refills: 0 | Status: DISCONTINUED | COMMUNITY
Start: 2017-10-03 | End: 2018-03-09

## 2018-03-10 LAB
ALBUMIN SERPL ELPH-MCNC: 4.5 G/DL
ALP BLD-CCNC: 73 U/L
ALT SERPL-CCNC: 12 U/L
ANION GAP SERPL CALC-SCNC: 12 MMOL/L
APPEARANCE: CLEAR
AST SERPL-CCNC: 22 U/L
BACTERIA: NEGATIVE
BASOPHILS # BLD AUTO: 0.05 K/UL
BASOPHILS NFR BLD AUTO: 0.8 %
BILIRUB SERPL-MCNC: 0.2 MG/DL
BILIRUBIN URINE: NEGATIVE
BLOOD URINE: NEGATIVE
BUN SERPL-MCNC: 15 MG/DL
CALCIUM SERPL-MCNC: 9.4 MG/DL
CHLORIDE SERPL-SCNC: 98 MMOL/L
CO2 SERPL-SCNC: 28 MMOL/L
COLOR: ABNORMAL
CREAT SERPL-MCNC: 0.62 MG/DL
CRP SERPL-MCNC: <0.2 MG/DL
EOSINOPHIL # BLD AUTO: 0.41 K/UL
EOSINOPHIL NFR BLD AUTO: 6.2 %
GLUCOSE QUALITATIVE U: NEGATIVE MG/DL
GLUCOSE SERPL-MCNC: 82 MG/DL
HCT VFR BLD CALC: 37.3 %
HGB BLD-MCNC: 11.9 G/DL
HYALINE CASTS: 3 /LPF
IMM GRANULOCYTES NFR BLD AUTO: 0.2 %
KETONES URINE: NEGATIVE
LEUKOCYTE ESTERASE URINE: ABNORMAL
LYMPHOCYTES # BLD AUTO: 1.35 K/UL
LYMPHOCYTES NFR BLD AUTO: 20.5 %
MAGNESIUM SERPL-MCNC: 2.2 MG/DL
MAN DIFF?: NORMAL
MCHC RBC-ENTMCNC: 30.4 PG
MCHC RBC-ENTMCNC: 31.9 GM/DL
MCV RBC AUTO: 95.4 FL
MICROSCOPIC-UA: NORMAL
MONOCYTES # BLD AUTO: 0.63 K/UL
MONOCYTES NFR BLD AUTO: 9.6 %
NEUTROPHILS # BLD AUTO: 4.13 K/UL
NEUTROPHILS NFR BLD AUTO: 62.7 %
NITRITE URINE: NEGATIVE
PH URINE: 6
PHOSPHATE SERPL-MCNC: 4.1 MG/DL
PLATELET # BLD AUTO: 405 K/UL
POTASSIUM SERPL-SCNC: 5.6 MMOL/L
PROT SERPL-MCNC: 6.9 G/DL
PROTEIN URINE: NEGATIVE MG/DL
RBC # BLD: 3.91 M/UL
RBC # FLD: 15.2 %
RED BLOOD CELLS URINE: 6 /HPF
SODIUM SERPL-SCNC: 138 MMOL/L
SPECIFIC GRAVITY URINE: 1.02
SQUAMOUS EPITHELIAL CELLS: 2 /HPF
UROBILINOGEN URINE: NEGATIVE MG/DL
WBC # FLD AUTO: 6.58 K/UL
WHITE BLOOD CELLS URINE: 2 /HPF

## 2018-03-11 LAB
ENA SS-A AB SER IA-ACNC: <0.2 AL
ENA SS-B AB SER IA-ACNC: <0.2 AL

## 2018-03-27 ENCOUNTER — APPOINTMENT (OUTPATIENT)
Dept: ORTHOPEDIC SURGERY | Facility: CLINIC | Age: 78
End: 2018-03-27
Payer: MEDICARE

## 2018-03-27 PROCEDURE — 99024 POSTOP FOLLOW-UP VISIT: CPT

## 2018-03-27 PROCEDURE — 73562 X-RAY EXAM OF KNEE 3: CPT | Mod: LT

## 2018-03-29 ENCOUNTER — OUTPATIENT (OUTPATIENT)
Dept: OUTPATIENT SERVICES | Facility: HOSPITAL | Age: 78
LOS: 1 days | End: 2018-03-29
Payer: MEDICARE

## 2018-03-29 VITALS
TEMPERATURE: 98 F | WEIGHT: 146.83 LBS | DIASTOLIC BLOOD PRESSURE: 90 MMHG | HEART RATE: 81 BPM | HEIGHT: 65 IN | RESPIRATION RATE: 16 BRPM | SYSTOLIC BLOOD PRESSURE: 160 MMHG

## 2018-03-29 DIAGNOSIS — Z98.890 OTHER SPECIFIED POSTPROCEDURAL STATES: Chronic | ICD-10-CM

## 2018-03-29 DIAGNOSIS — Z01.818 ENCOUNTER FOR OTHER PREPROCEDURAL EXAMINATION: ICD-10-CM

## 2018-03-29 DIAGNOSIS — Z96.652 PRESENCE OF LEFT ARTIFICIAL KNEE JOINT: Chronic | ICD-10-CM

## 2018-03-29 DIAGNOSIS — S62.101A FRACTURE OF UNSPECIFIED CARPAL BONE, RIGHT WRIST, INITIAL ENCOUNTER FOR CLOSED FRACTURE: Chronic | ICD-10-CM

## 2018-03-29 DIAGNOSIS — M19.90 UNSPECIFIED OSTEOARTHRITIS, UNSPECIFIED SITE: ICD-10-CM

## 2018-03-29 LAB
ANION GAP SERPL CALC-SCNC: 14 MMOL/L — SIGNIFICANT CHANGE UP (ref 5–17)
APTT BLD: 28.3 SEC — SIGNIFICANT CHANGE UP (ref 27.5–37.4)
BUN SERPL-MCNC: 14 MG/DL — SIGNIFICANT CHANGE UP (ref 8–20)
CALCIUM SERPL-MCNC: 9 MG/DL — SIGNIFICANT CHANGE UP (ref 8.6–10.2)
CHLORIDE SERPL-SCNC: 98 MMOL/L — SIGNIFICANT CHANGE UP (ref 98–107)
CO2 SERPL-SCNC: 25 MMOL/L — SIGNIFICANT CHANGE UP (ref 22–29)
CREAT SERPL-MCNC: 0.43 MG/DL — LOW (ref 0.5–1.3)
GLUCOSE SERPL-MCNC: 71 MG/DL — SIGNIFICANT CHANGE UP (ref 70–115)
HCT VFR BLD CALC: 37.2 % — SIGNIFICANT CHANGE UP (ref 37–47)
HGB BLD-MCNC: 11.9 G/DL — LOW (ref 12–16)
INR BLD: 1.01 RATIO — SIGNIFICANT CHANGE UP (ref 0.88–1.16)
MCHC RBC-ENTMCNC: 29.5 PG — SIGNIFICANT CHANGE UP (ref 27–31)
MCHC RBC-ENTMCNC: 32 G/DL — SIGNIFICANT CHANGE UP (ref 32–36)
MCV RBC AUTO: 92.1 FL — SIGNIFICANT CHANGE UP (ref 81–99)
MRSA PCR RESULT.: SIGNIFICANT CHANGE UP
PLATELET # BLD AUTO: 333 K/UL — SIGNIFICANT CHANGE UP (ref 150–400)
POTASSIUM SERPL-MCNC: 4.4 MMOL/L — SIGNIFICANT CHANGE UP (ref 3.5–5.3)
POTASSIUM SERPL-SCNC: 4.4 MMOL/L — SIGNIFICANT CHANGE UP (ref 3.5–5.3)
PROTHROM AB SERPL-ACNC: 11.1 SEC — SIGNIFICANT CHANGE UP (ref 9.8–12.7)
RBC # BLD: 4.04 M/UL — LOW (ref 4.4–5.2)
RBC # FLD: 13.7 % — SIGNIFICANT CHANGE UP (ref 11–15.6)
S AUREUS DNA NOSE QL NAA+PROBE: SIGNIFICANT CHANGE UP
SODIUM SERPL-SCNC: 137 MMOL/L — SIGNIFICANT CHANGE UP (ref 135–145)
TYPE + AB SCN PNL BLD: SIGNIFICANT CHANGE UP
WBC # BLD: 5.6 K/UL — SIGNIFICANT CHANGE UP (ref 4.8–10.8)
WBC # FLD AUTO: 5.6 K/UL — SIGNIFICANT CHANGE UP (ref 4.8–10.8)

## 2018-03-29 PROCEDURE — 36415 COLL VENOUS BLD VENIPUNCTURE: CPT

## 2018-03-29 PROCEDURE — 85610 PROTHROMBIN TIME: CPT

## 2018-03-29 PROCEDURE — G0463: CPT

## 2018-03-29 PROCEDURE — 87640 STAPH A DNA AMP PROBE: CPT

## 2018-03-29 PROCEDURE — 93010 ELECTROCARDIOGRAM REPORT: CPT

## 2018-03-29 PROCEDURE — 80048 BASIC METABOLIC PNL TOTAL CA: CPT

## 2018-03-29 PROCEDURE — 86850 RBC ANTIBODY SCREEN: CPT

## 2018-03-29 PROCEDURE — 93005 ELECTROCARDIOGRAM TRACING: CPT

## 2018-03-29 PROCEDURE — 85027 COMPLETE CBC AUTOMATED: CPT

## 2018-03-29 PROCEDURE — 86901 BLOOD TYPING SEROLOGIC RH(D): CPT

## 2018-03-29 PROCEDURE — 85730 THROMBOPLASTIN TIME PARTIAL: CPT

## 2018-03-29 PROCEDURE — 86900 BLOOD TYPING SEROLOGIC ABO: CPT

## 2018-03-29 RX ORDER — PRIMIDONE 250 MG/1
2 TABLET ORAL
Qty: 0 | Refills: 0 | COMMUNITY

## 2018-03-29 RX ORDER — SODIUM CHLORIDE 9 MG/ML
3 INJECTION INTRAMUSCULAR; INTRAVENOUS; SUBCUTANEOUS EVERY 8 HOURS
Qty: 0 | Refills: 0 | Status: DISCONTINUED | OUTPATIENT
Start: 2018-04-19 | End: 2018-04-19

## 2018-03-29 NOTE — H&P PST ADULT - ASSESSMENT
CAPRINI SCORE [CLOT]    AGE RELATED RISK FACTORS                                                       MOBILITY RELATED FACTORS  [ ] Age 41-60 years                                            (1 Point)                  [ ] Bed rest                                                        (1 Point)  [ ] Age: 61-74 years                                           (2 Points)                 [ ] Plaster cast                                                   (2 Points)  [x ] Age= 75 years                                              (3 Points)                 [ ] Bed bound for more than 72 hours                 (2 Points)    DISEASE RELATED RISK FACTORS                                               GENDER SPECIFIC FACTORS  [ ] Edema in the lower extremities                       (1 Point)                  [ ] Pregnancy                                                     (1 Point)  [ ] Varicose veins                                               (1 Point)                  [ ] Post-partum < 6 weeks                                   (1 Point)             [ ] BMI > 25 Kg/m2                                            (1 Point)                  [ ] Hormonal therapy  or oral contraception          (1 Point)                 [ ] Sepsis (in the previous month)                        (1 Point)                  [ ] History of pregnancy complications                 (1 point)  [ ] Pneumonia or serious lung disease                                               [ ] Unexplained or recurrent                     (1 Point)           (in the previous month)                               (1 Point)  [ ] Abnormal pulmonary function test                     (1 Point)                 SURGERY RELATED RISK FACTORS  [ ] Acute myocardial infarction                              (1 Point)                 [ ]  Section                                             (1 Point)  [ ] Congestive heart failure (in the previous month)  (1 Point)               [ ] Minor surgery                                                  (1 Point)   [ ] Inflammatory bowel disease                             (1 Point)                 [ ] Arthroscopic surgery                                        (2 Points)  [ ] Central venous access                                      (2 Points)                [ ] General surgery lasting more than 45 minutes   (2 Points)       [ ] Stroke (in the previous month)                          (5 Points)               [x ] Elective arthroplasty                                         (5 Points)                                                                                                                                               HEMATOLOGY RELATED FACTORS                                                 TRAUMA RELATED RISK FACTORS  [ ] Prior episodes of VTE                                     (3 Points)                 [ ] Fracture of the hip, pelvis, or leg                       (5 Points)  [ ] Positive family history for VTE                         (3 Points)                 [ ] Acute spinal cord injury (in the previous month)  (5 Points)  [ ] Prothrombin 70381 A                                     (3 Points)                 [ ] Paralysis  (less than 1 month)                             (5 Points)  [ ] Factor V Leiden                                             (3 Points)                  [ ] Multiple Trauma within 1 month                        (5 Points)  [ ] Lupus anticoagulants                                     (3 Points)                                                           [ ] Anticardiolipin antibodies                               (3 Points)                                                       [ ] High homocysteine in the blood                      (3 Points)                                             [ ] Other congenital or acquired thrombophilia      (3 Points)                                                [ ] Heparin induced thrombocytopenia                  (3 Points)                                          Total Score [      8    ]  77 year old female presents with osteoarthritis of the right knee scheduled for a right total knee replacement on 18.

## 2018-03-29 NOTE — PATIENT PROFILE ADULT. - PMH
Baker's cyst of knee  bilateral  Breast mass, right    Cataract    Depression    GERD (gastroesophageal reflux disease)    Migraines    Osteoarthritis    Osteoporosis    Seizures    Tremor of both hands

## 2018-03-29 NOTE — H&P PST ADULT - PMH
Baker's cyst of knee  bilateral  Breast mass, right    Cataract    Depression    GERD (gastroesophageal reflux disease)    Migraines    Osteoarthritis    Osteoporosis    Seizures  Last episode Oct 2013  Tremor of both hands

## 2018-04-17 RX ORDER — TRANEXAMIC ACID 100 MG/ML
650 INJECTION, SOLUTION INTRAVENOUS ONCE
Qty: 0 | Refills: 0 | Status: DISCONTINUED | OUTPATIENT
Start: 2018-04-19 | End: 2018-04-19

## 2018-04-17 RX ORDER — CEFAZOLIN SODIUM 1 G
2000 VIAL (EA) INJECTION ONCE
Qty: 0 | Refills: 0 | Status: DISCONTINUED | OUTPATIENT
Start: 2018-04-19 | End: 2018-04-19

## 2018-04-17 RX ORDER — CELECOXIB 200 MG/1
400 CAPSULE ORAL ONCE
Qty: 0 | Refills: 0 | Status: COMPLETED | OUTPATIENT
Start: 2018-04-19 | End: 2018-04-19

## 2018-04-17 RX ORDER — VANCOMYCIN HCL 1 G
1000 VIAL (EA) INTRAVENOUS ONCE
Qty: 0 | Refills: 0 | Status: COMPLETED | OUTPATIENT
Start: 2018-04-19 | End: 2018-04-19

## 2018-04-17 RX ORDER — ACETAMINOPHEN 500 MG
1000 TABLET ORAL ONCE
Qty: 0 | Refills: 0 | Status: DISCONTINUED | OUTPATIENT
Start: 2018-04-19 | End: 2018-04-19

## 2018-04-17 RX ORDER — GABAPENTIN 400 MG/1
600 CAPSULE ORAL ONCE
Qty: 0 | Refills: 0 | Status: COMPLETED | OUTPATIENT
Start: 2018-04-19 | End: 2018-04-19

## 2018-04-17 RX ORDER — OXYCODONE HYDROCHLORIDE 5 MG/1
10 TABLET ORAL ONCE
Qty: 0 | Refills: 0 | Status: DISCONTINUED | OUTPATIENT
Start: 2018-04-19 | End: 2018-04-19

## 2018-04-18 ENCOUNTER — TRANSCRIPTION ENCOUNTER (OUTPATIENT)
Age: 78
End: 2018-04-18

## 2018-04-19 ENCOUNTER — RESULT REVIEW (OUTPATIENT)
Age: 78
End: 2018-04-19

## 2018-04-19 ENCOUNTER — APPOINTMENT (OUTPATIENT)
Dept: ORTHOPEDIC SURGERY | Facility: HOSPITAL | Age: 78
End: 2018-04-19

## 2018-04-19 ENCOUNTER — TRANSCRIPTION ENCOUNTER (OUTPATIENT)
Age: 78
End: 2018-04-19

## 2018-04-19 ENCOUNTER — INPATIENT (INPATIENT)
Facility: HOSPITAL | Age: 78
LOS: 1 days | Discharge: HOME | DRG: 470 | End: 2018-04-21
Attending: ORTHOPAEDIC SURGERY | Admitting: ORTHOPAEDIC SURGERY
Payer: MEDICARE

## 2018-04-19 VITALS
HEIGHT: 65 IN | HEART RATE: 86 BPM | DIASTOLIC BLOOD PRESSURE: 80 MMHG | WEIGHT: 145.51 LBS | TEMPERATURE: 98 F | OXYGEN SATURATION: 99 % | SYSTOLIC BLOOD PRESSURE: 130 MMHG | RESPIRATION RATE: 16 BRPM

## 2018-04-19 DIAGNOSIS — M17.11 UNILATERAL PRIMARY OSTEOARTHRITIS, RIGHT KNEE: ICD-10-CM

## 2018-04-19 DIAGNOSIS — Z96.652 PRESENCE OF LEFT ARTIFICIAL KNEE JOINT: Chronic | ICD-10-CM

## 2018-04-19 DIAGNOSIS — Z98.890 OTHER SPECIFIED POSTPROCEDURAL STATES: Chronic | ICD-10-CM

## 2018-04-19 DIAGNOSIS — S62.101A FRACTURE OF UNSPECIFIED CARPAL BONE, RIGHT WRIST, INITIAL ENCOUNTER FOR CLOSED FRACTURE: Chronic | ICD-10-CM

## 2018-04-19 LAB — TYPE + AB SCN PNL BLD: SIGNIFICANT CHANGE UP

## 2018-04-19 PROCEDURE — 27447 TOTAL KNEE ARTHROPLASTY: CPT | Mod: 79,RT

## 2018-04-19 PROCEDURE — 88311 DECALCIFY TISSUE: CPT | Mod: 26

## 2018-04-19 PROCEDURE — 73560 X-RAY EXAM OF KNEE 1 OR 2: CPT | Mod: 26,RT

## 2018-04-19 PROCEDURE — 20985 CPTR-ASST DIR MS PX: CPT

## 2018-04-19 PROCEDURE — 99222 1ST HOSP IP/OBS MODERATE 55: CPT

## 2018-04-19 PROCEDURE — 88305 TISSUE EXAM BY PATHOLOGIST: CPT | Mod: 26

## 2018-04-19 RX ORDER — FOLIC ACID 0.8 MG
1 TABLET ORAL DAILY
Qty: 0 | Refills: 0 | Status: DISCONTINUED | OUTPATIENT
Start: 2018-04-19 | End: 2018-04-21

## 2018-04-19 RX ORDER — OMEPRAZOLE 10 MG/1
1 CAPSULE, DELAYED RELEASE ORAL
Qty: 0 | Refills: 0 | COMMUNITY

## 2018-04-19 RX ORDER — MAGNESIUM HYDROXIDE 400 MG/1
30 TABLET, CHEWABLE ORAL DAILY
Qty: 0 | Refills: 0 | Status: DISCONTINUED | OUTPATIENT
Start: 2018-04-19 | End: 2018-04-21

## 2018-04-19 RX ORDER — LEVETIRACETAM 250 MG/1
750 TABLET, FILM COATED ORAL AT BEDTIME
Qty: 0 | Refills: 0 | Status: DISCONTINUED | OUTPATIENT
Start: 2018-04-19 | End: 2018-04-21

## 2018-04-19 RX ORDER — SODIUM CHLORIDE 9 MG/ML
1000 INJECTION, SOLUTION INTRAVENOUS
Qty: 0 | Refills: 0 | Status: DISCONTINUED | OUTPATIENT
Start: 2018-04-19 | End: 2018-04-19

## 2018-04-19 RX ORDER — ASPIRIN/CALCIUM CARB/MAGNESIUM 324 MG
325 TABLET ORAL
Qty: 0 | Refills: 0 | Status: DISCONTINUED | OUTPATIENT
Start: 2018-04-20 | End: 2018-04-21

## 2018-04-19 RX ORDER — OXYCODONE HYDROCHLORIDE 5 MG/1
10 TABLET ORAL EVERY 12 HOURS
Qty: 0 | Refills: 0 | Status: DISCONTINUED | OUTPATIENT
Start: 2018-04-19 | End: 2018-04-21

## 2018-04-19 RX ORDER — FENTANYL CITRATE 50 UG/ML
50 INJECTION INTRAVENOUS
Qty: 0 | Refills: 0 | Status: DISCONTINUED | OUTPATIENT
Start: 2018-04-19 | End: 2018-04-19

## 2018-04-19 RX ORDER — ERGOCALCIFEROL 1.25 MG/1
1 CAPSULE ORAL
Qty: 0 | Refills: 0 | COMMUNITY

## 2018-04-19 RX ORDER — FERROUS SULFATE 325(65) MG
325 TABLET ORAL DAILY
Qty: 0 | Refills: 0 | Status: DISCONTINUED | OUTPATIENT
Start: 2018-04-19 | End: 2018-04-21

## 2018-04-19 RX ORDER — KETOROLAC TROMETHAMINE 30 MG/ML
15 SYRINGE (ML) INJECTION EVERY 6 HOURS
Qty: 0 | Refills: 0 | Status: DISCONTINUED | OUTPATIENT
Start: 2018-04-19 | End: 2018-04-20

## 2018-04-19 RX ORDER — PANTOPRAZOLE SODIUM 20 MG/1
40 TABLET, DELAYED RELEASE ORAL
Qty: 0 | Refills: 0 | Status: DISCONTINUED | OUTPATIENT
Start: 2018-04-19 | End: 2018-04-21

## 2018-04-19 RX ORDER — MORPHINE SULFATE 50 MG/1
2 CAPSULE, EXTENDED RELEASE ORAL EVERY 4 HOURS
Qty: 0 | Refills: 0 | Status: DISCONTINUED | OUTPATIENT
Start: 2018-04-19 | End: 2018-04-21

## 2018-04-19 RX ORDER — DENOSUMAB 60 MG/ML
1 INJECTION SUBCUTANEOUS
Qty: 0 | Refills: 0 | COMMUNITY

## 2018-04-19 RX ORDER — PRIMIDONE 250 MG/1
100 TABLET ORAL DAILY
Qty: 0 | Refills: 0 | Status: DISCONTINUED | OUTPATIENT
Start: 2018-04-19 | End: 2018-04-21

## 2018-04-19 RX ORDER — ONDANSETRON 8 MG/1
4 TABLET, FILM COATED ORAL ONCE
Qty: 0 | Refills: 0 | Status: DISCONTINUED | OUTPATIENT
Start: 2018-04-19 | End: 2018-04-19

## 2018-04-19 RX ORDER — OXYCODONE HYDROCHLORIDE 5 MG/1
5 TABLET ORAL EVERY 4 HOURS
Qty: 0 | Refills: 0 | Status: DISCONTINUED | OUTPATIENT
Start: 2018-04-19 | End: 2018-04-21

## 2018-04-19 RX ORDER — SODIUM CHLORIDE 9 MG/ML
1000 INJECTION, SOLUTION INTRAVENOUS
Qty: 0 | Refills: 0 | Status: DISCONTINUED | OUTPATIENT
Start: 2018-04-19 | End: 2018-04-20

## 2018-04-19 RX ORDER — ACETAMINOPHEN 500 MG
975 TABLET ORAL EVERY 6 HOURS
Qty: 0 | Refills: 0 | Status: DISCONTINUED | OUTPATIENT
Start: 2018-04-19 | End: 2018-04-21

## 2018-04-19 RX ORDER — CITALOPRAM 10 MG/1
10 TABLET, FILM COATED ORAL DAILY
Qty: 0 | Refills: 0 | Status: DISCONTINUED | OUTPATIENT
Start: 2018-04-19 | End: 2018-04-21

## 2018-04-19 RX ORDER — LEVETIRACETAM 250 MG/1
1 TABLET, FILM COATED ORAL
Qty: 0 | Refills: 0 | COMMUNITY

## 2018-04-19 RX ORDER — OXYCODONE HYDROCHLORIDE 5 MG/1
10 TABLET ORAL EVERY 4 HOURS
Qty: 0 | Refills: 0 | Status: DISCONTINUED | OUTPATIENT
Start: 2018-04-19 | End: 2018-04-21

## 2018-04-19 RX ORDER — CITALOPRAM 10 MG/1
1 TABLET, FILM COATED ORAL
Qty: 0 | Refills: 0 | COMMUNITY

## 2018-04-19 RX ORDER — PRIMIDONE 250 MG/1
2 TABLET ORAL
Qty: 0 | Refills: 0 | COMMUNITY

## 2018-04-19 RX ORDER — VANCOMYCIN HCL 1 G
1000 VIAL (EA) INTRAVENOUS
Qty: 0 | Refills: 0 | Status: COMPLETED | OUTPATIENT
Start: 2018-04-19 | End: 2018-04-19

## 2018-04-19 RX ORDER — OMEGA-3 ACID ETHYL ESTERS 1 G
1 CAPSULE ORAL
Qty: 0 | Refills: 0 | COMMUNITY

## 2018-04-19 RX ORDER — CEFAZOLIN SODIUM 1 G
2000 VIAL (EA) INJECTION
Qty: 0 | Refills: 0 | Status: COMPLETED | OUTPATIENT
Start: 2018-04-19 | End: 2018-04-20

## 2018-04-19 RX ORDER — ACETAMINOPHEN 500 MG
650 TABLET ORAL EVERY 6 HOURS
Qty: 0 | Refills: 0 | Status: DISCONTINUED | OUTPATIENT
Start: 2018-04-19 | End: 2018-04-21

## 2018-04-19 RX ORDER — SENNA PLUS 8.6 MG/1
2 TABLET ORAL AT BEDTIME
Qty: 0 | Refills: 0 | Status: DISCONTINUED | OUTPATIENT
Start: 2018-04-19 | End: 2018-04-21

## 2018-04-19 RX ORDER — DOCUSATE SODIUM 100 MG
100 CAPSULE ORAL THREE TIMES A DAY
Qty: 0 | Refills: 0 | Status: DISCONTINUED | OUTPATIENT
Start: 2018-04-19 | End: 2018-04-21

## 2018-04-19 RX ORDER — SUMATRIPTAN SUCCINATE 4 MG/.5ML
1 INJECTION, SOLUTION SUBCUTANEOUS
Qty: 0 | Refills: 0 | COMMUNITY

## 2018-04-19 RX ORDER — ONDANSETRON 8 MG/1
4 TABLET, FILM COATED ORAL EVERY 6 HOURS
Qty: 0 | Refills: 0 | Status: DISCONTINUED | OUTPATIENT
Start: 2018-04-19 | End: 2018-04-21

## 2018-04-19 RX ADMIN — CELECOXIB 400 MILLIGRAM(S): 200 CAPSULE ORAL at 07:41

## 2018-04-19 RX ADMIN — CITALOPRAM 10 MILLIGRAM(S): 10 TABLET, FILM COATED ORAL at 21:37

## 2018-04-19 RX ADMIN — Medication 250 MILLIGRAM(S): at 08:31

## 2018-04-19 RX ADMIN — GABAPENTIN 600 MILLIGRAM(S): 400 CAPSULE ORAL at 07:41

## 2018-04-19 RX ADMIN — OXYCODONE HYDROCHLORIDE 10 MILLIGRAM(S): 5 TABLET ORAL at 07:41

## 2018-04-19 RX ADMIN — Medication 100 MILLIGRAM(S): at 18:02

## 2018-04-19 RX ADMIN — Medication 250 MILLIGRAM(S): at 20:32

## 2018-04-19 RX ADMIN — PRIMIDONE 100 MILLIGRAM(S): 250 TABLET ORAL at 21:37

## 2018-04-19 RX ADMIN — Medication 15 MILLIGRAM(S): at 17:11

## 2018-04-19 RX ADMIN — Medication 15 MILLIGRAM(S): at 18:25

## 2018-04-19 RX ADMIN — OXYCODONE HYDROCHLORIDE 10 MILLIGRAM(S): 5 TABLET ORAL at 20:32

## 2018-04-19 RX ADMIN — OXYCODONE HYDROCHLORIDE 10 MILLIGRAM(S): 5 TABLET ORAL at 18:25

## 2018-04-19 RX ADMIN — Medication 975 MILLIGRAM(S): at 18:25

## 2018-04-19 RX ADMIN — Medication 975 MILLIGRAM(S): at 17:06

## 2018-04-19 RX ADMIN — LEVETIRACETAM 750 MILLIGRAM(S): 250 TABLET, FILM COATED ORAL at 21:39

## 2018-04-19 RX ADMIN — Medication 100 MILLIGRAM(S): at 21:37

## 2018-04-19 RX ADMIN — OXYCODONE HYDROCHLORIDE 10 MILLIGRAM(S): 5 TABLET ORAL at 17:11

## 2018-04-19 RX ADMIN — SODIUM CHLORIDE 100 MILLILITER(S): 9 INJECTION, SOLUTION INTRAVENOUS at 18:03

## 2018-04-19 RX ADMIN — OXYCODONE HYDROCHLORIDE 10 MILLIGRAM(S): 5 TABLET ORAL at 21:15

## 2018-04-19 NOTE — DISCHARGE NOTE ADULT - HOSPITAL COURSE
The patient underwent a RIGHT TOTAL KNEE REPLACEMENT on 4/19/18. The patient received antibiotics consistent with SCIP guidelines. The patient underwent the procedure and had no intra-operative complications. Post-operatively, the patient was seen by medicine and PT. The patient received ASPIRIN for DVTP. The patient received pain medications per orthopedic pain management protocol and the pain was appropriately controlled. The patient did not have any post-operative medical complications. The patient was discharged in stable condition.

## 2018-04-19 NOTE — DISCHARGE NOTE ADULT - PATIENT PORTAL LINK FT
You can access the [facility], offered by [facility], by registering with the following website: [email or web address]

## 2018-04-19 NOTE — PHYSICAL THERAPY INITIAL EVALUATION ADULT - GENERAL OBSERVATIONS, REHAB EVAL
Pt received supine in bed + telemetry + IV + Venous Compression Boots,  no c/o pain, pt agreeable to PT

## 2018-04-19 NOTE — CONSULT NOTE ADULT - ASSESSMENT
77 year old female with a history of depression, GERD, and seizures (last seizure 2013), Migraines presents with osteoarthritis of the right knee scheduled for a right total knee replacement. She is s/p right Total knee replacement POD#0    Rt knee primary OA  - s/p rt TKR    Rt knee hip replacement - Pain control   Bowel regimen   Incentive spirometry  DVT px - per ortho   PT    Seizures - Ct keppra at home dose    Migraines - well controlled   sumatriptan PRN     Anemia - ? cause  monitor   Labs in am,     will follow

## 2018-04-19 NOTE — DISCHARGE NOTE ADULT - CARE PROVIDER_API CALL
Rayshawn Chambers), Orthopaedic Surgery  [address] [place] [address]  Phone: [phone]  Fax: [phone]

## 2018-04-19 NOTE — DISCHARGE NOTE ADULT - MEDICATION SUMMARY - MEDICATIONS TO TAKE
I will START or STAY ON the medications listed below when I get home from the hospital:    SUMAtriptan 50 mg oral tablet  -- 1 tab(s) by mouth once, As Needed  -- Indication: For Home med    Aspirin Enteric Coated 325 mg oral delayed release tablet  -- 1 tab(s) by mouth 2 times a day   -- Swallow whole.  Do not crush.  Take with food or milk.    -- Indication: For DVTP    oxyCODONE 5 mg oral tablet  -- 1-2 tab(s) by mouth every 4 to 6 hours, As Needed -Moderate Pain MDD:8  -- Indication: For Pain    levETIRAcetam 750 mg oral tablet  -- 1 tab(s) by mouth once a day (at bedtime)  -- Indication: For Home med    primidone 50 mg oral tablet  -- 2 tab(s) by mouth once a day  -- Indication: For Home med    citalopram 10 mg oral tablet  -- 1 tab(s) by mouth once a day  -- Indication: For Home med    Prolia 60 mg/mL subcutaneous solution  -- 1 milliliter(s) subcutaneous every 6 months  -- Indication: For Home med    Senna S 50 mg-8.6 mg oral tablet  -- 2 tab(s) by mouth once a day (at bedtime)   -- Medication should be taken with plenty of water.    -- Indication: For constipation prevention    Fish Oil 1200 mg oral capsule  -- 1 cap(s) by mouth once a day  -- Indication: For Home med    omeprazole 40 mg oral delayed release capsule  -- 1 cap(s) by mouth once a day  -- Indication: For Home med    Calcium 600+D oral tablet  -- 1 tab(s) by mouth once a day  -- Indication: For Home med    B Complex 50 oral tablet, extended release  -- 1 tab(s) by mouth once a day  -- Indication: For Home med    Vitamin D2 50,000 intl units (1.25 mg) oral capsule  -- 1 cap(s) by mouth every 2 weeks  -- Indication: For Home med

## 2018-04-19 NOTE — PHYSICAL THERAPY INITIAL EVALUATION ADULT - RANGE OF MOTION EXAMINATION, REHAB EVAL
right knee lacking approx 5-10 degrees extension (in sitting) to approx 55-60 degrees flexion (in sitting)/bilateral upper extremity ROM was WFL (within functional limits)/Left LE ROM was WFL (within functional limits)

## 2018-04-19 NOTE — PROGRESS NOTE ADULT - SUBJECTIVE AND OBJECTIVE BOX
Orthopedic PA Postop Note  Patient S/P RIGHT TKA  Patient in bed comfortable   RIGHT Leg  Dressing C/D/I - ACE wrap without staining  DP Pulse intact   Calf Soft NT  Dorsi/Plantar Flexion/EHL/FHL intact     Vital Signs Last 24 Hrs  T(C): 36.6 (19 Apr 2018 16:33), Max: 36.8 (19 Apr 2018 12:15)  T(F): 97.9 (19 Apr 2018 16:33), Max: 98.2 (19 Apr 2018 12:15)  HR: 81 (19 Apr 2018 16:33) (66 - 94)  BP: 158/83 (19 Apr 2018 16:33) (119/80 - 158/83)  BP(mean): --  RR: 18 (19 Apr 2018 16:33) (10 - 19)  SpO2: 95% (19 Apr 2018 16:33) (95% - 100%)    < from: Xray Knee 1 or 2 Views, Right (04.19.18 @ 12:18) >   EXAM:  KNEE-RIGHT                          PROCEDURE DATE:  04/19/2018          INTERPRETATION:  Right knee    Portable AP and lateral views.    Postoperative evaluation. Status post right knee replacement.    The prosthetic components are in goodposition. The alignment is normal.   Gas and fluid is present in the suprapatellar recess.    Impression: Right total knee replacement in good position                NILS MIRANDA M.D., ATTENDING RADIOLOGIST  This document has been electronically signed. Apr 19 2018 12:39PM              < end of copied text >      A/P: 77F S/P RIGHT TKA  1. DVTP - ASA  2. Physical Therapy   3. Pain Control as clinically indicated

## 2018-04-19 NOTE — CONSULT NOTE ADULT - SUBJECTIVE AND OBJECTIVE BOX
PMD : [place]  Cardio : [facility] cardio     chief complaint rt knee pain       HPI:  77 year old female with a history of depression, GERD, and seizures (last seizure 2013), Migraines presents with osteoarthritis of the right knee scheduled for a right total knee replacement. She is s/p right Total knee repalcement POD#0      PAST MEDICAL & SURGICAL HISTORY:  Breast mass, right  Baker's cyst of knee: bilateral  Tremor of both hands  Cataract  Osteoporosis  Migraines  Seizures: Last episode Oct 2013  Depression  GERD (gastroesophageal reflux disease)  Osteoarthritis  History of total left knee replacement  Right wrist fracture: may 2014  H/O right breast biopsy      Social History:  Tabacco - former smoker   ETOH - occasional   Illicit drug abuse - denies    FAMILY HISTORY:  Family history of heart disease (Father): palpitations      Allergies    No Known Allergies    Intolerances        HOME MEDICATIONS : reviewed     REVIEW OF SYSTEMS:    CONSTITUTIONAL: No fever, weight loss, or fatigue  EYES: No eye pain, visual disturbances, or discharge  NECK: No pain or stiffness  RESPIRATORY: No cough, wheezing, chills or hemoptysis; No shortness of breath  CARDIOVASCULAR: No chest pain, palpitations, dizziness, or leg swelling  GASTROINTESTINAL: No abdominal or epigastric pain. No nausea, vomiting, or hematemesis; No diarrhea or constipation. No melena or hematochezia.  GENITOURINARY: No dysuria, frequency, hematuria, or incontinence  NEUROLOGICAL: No headaches, memory loss, loss of strength, numbness, or tremors  SKIN: No itching, burning, rashes, or lesions   LYMPH NODES: No enlarged glands  ENDOCRINE: No heat or cold intolerance; No hair loss  MUSCULOSKELETAL:   PSYCHIATRIC: No depression, anxiety, mood swings, or difficulty sleeping  HEME/LYMPH: No easy bruising, or bleeding gums  ALLERGY AND IMMUNOLOGIC: No hives or eczema    MEDICATIONS  (STANDING):  ceFAZolin   IVPB 2000 milliGRAM(s) IV Intermittent <User Schedule>  lactated ringers. 1000 milliLiter(s) (100 mL/Hr) IV Continuous <Continuous>  vancomycin  IVPB 1000 milliGRAM(s) IV Intermittent <User Schedule>    MEDICATIONS  (PRN):  fentaNYL    Injectable 50 MICROGram(s) IV Push every 5 minutes PRN Severe Pain  ondansetron Injectable 4 milliGRAM(s) IV Push once PRN Nausea and/or Vomiting      Vital Signs Last 24 Hrs  T(C): 36.8 (19 Apr 2018 12:15), Max: 36.8 (19 Apr 2018 12:15)  T(F): 98.2 (19 Apr 2018 12:15), Max: 98.2 (19 Apr 2018 12:15)  HR: 76 (19 Apr 2018 14:45) (66 - 94)  BP: 135/80 (19 Apr 2018 14:45) (119/80 - 149/73)  BP(mean): --  RR: 14 (19 Apr 2018 14:45) (10 - 19)  SpO2: 99% (19 Apr 2018 14:45) (99% - 100%)    PHYSICAL EXAM:    GENERAL: NAD, well-groomed, well-developed  HEAD:  Atraumatic, Normocephalic  EYES: EOMI, PERRLA, conjunctiva and sclera clear  NECK: Supple, No JVD, Normal thyroid  NERVOUS SYSTEM:  Alert & Oriented X3, Good concentration; Motor Strength 5/5 B/L upper and lower extremities; DTRs 2+ intact and symmetric  CHEST/LUNG: CTA  b/l,  no rales, rhonchi, wheezing, or rubs  HEART: Regular rate and rhythm; No murmurs, rubs, or gallops  ABDOMEN: Soft, Nontender, Nondistended; Bowel sounds present  EXTREMITIES:  2+ Peripheral Pulses, No clubbing, cyanosis, or edema ,   LYMPH: No lymphadenopathy noted  SKIN: No rashes or lesions    LABS:      reviewed             RADIOLOGY & ADDITIONAL STUDIES:    PET mypcardial Perfusion scan - Nov 2014 - normal coronary perfusion   ECHO - oct 2014 - EF - normal     EKG - tracing reviewed - NSR< Twave inversion in V1, V2 - nonspecific T wave abnormality PMD : [place]  Cardio : [facility] cardio     chief complaint rt knee pain       HPI:  77 year old female with a history of depression, GERD, and seizures (last seizure 2013), Migraines presents with osteoarthritis of the right knee scheduled for a right total knee replacement. She is s/p right Total knee replacement POD#0  Doing well, denies any pain or nausea.       PAST MEDICAL & SURGICAL HISTORY:  Breast mass, right  Baker's cyst of knee: bilateral  Tremor of both hands  Cataract  Osteoporosis  Migraines  Seizures: Last episode Oct 2013  Depression  GERD (gastroesophageal reflux disease)  Osteoarthritis  History of total left knee replacement  Right wrist fracture: may 2014  H/O right breast biopsy      Social History:  Tabacco - former smoker   ETOH - occasional   Illicit drug abuse - denies    FAMILY HISTORY:  Family history of heart disease (Father): palpitations      Allergies    No Known Allergies    Intolerances        HOME MEDICATIONS : reviewed     REVIEW OF SYSTEMS:    CONSTITUTIONAL: No fever, weight loss, or fatigue  RESPIRATORY: No cough, wheezing, chills or hemoptysis; No shortness of breath  CARDIOVASCULAR: No chest pain, palpitations, dizziness, or leg swelling  GASTROINTESTINAL: No abdominal or epigastric pain. No nausea, vomiting  GENITOURINARY: No dysuria, frequency, hematuria, or incontinence  NEUROLOGICAL: No headaches.  migraines are controlled.       MEDICATIONS  (STANDING):  ceFAZolin   IVPB 2000 milliGRAM(s) IV Intermittent <User Schedule>  lactated ringers. 1000 milliLiter(s) (100 mL/Hr) IV Continuous <Continuous>  vancomycin  IVPB 1000 milliGRAM(s) IV Intermittent <User Schedule>    MEDICATIONS  (PRN):  fentaNYL    Injectable 50 MICROGram(s) IV Push every 5 minutes PRN Severe Pain  ondansetron Injectable 4 milliGRAM(s) IV Push once PRN Nausea and/or Vomiting      Vital Signs Last 24 Hrs  T(C): 36.8 (19 Apr 2018 12:15), Max: 36.8 (19 Apr 2018 12:15)  T(F): 98.2 (19 Apr 2018 12:15), Max: 98.2 (19 Apr 2018 12:15)  HR: 76 (19 Apr 2018 14:45) (66 - 94)  BP: 135/80 (19 Apr 2018 14:45) (119/80 - 149/73)  BP(mean): --  RR: 14 (19 Apr 2018 14:45) (10 - 19)  SpO2: 99% (19 Apr 2018 14:45) (99% - 100%)    PHYSICAL EXAM:    GENERAL: NAD, well-groomed, well-developed  HEAD:  Atraumatic, Normocephalic  EYES: EOMI, PERRLA, conjunctiva and sclera clear  NECK: Supple, No JVD  NERVOUS SYSTEM:  Alert & Oriented X3, Good concentration  CHEST/LUNG: CTA  b/l,  no rales, rhonchi, wheezing  HEART: Regular rate and rhythm; No murmurs  ABDOMEN: Soft, Nontender, Nondistended; Bowel sounds present  EXTREMITIES:  2+ Peripheral Pulses, No clubbing, cyanosis, or edema ,       LABS:  reviewed       RADIOLOGY & ADDITIONAL STUDIES:    PET mypcardial Perfusion scan - Nov 2014 - normal coronary perfusion   ECHO - oct 2014 - EF - normal     EKG - tracing reviewed - NSR< Twave inversion in V1, V2 - nonspecific T wave abnormality

## 2018-04-19 NOTE — DISCHARGE NOTE ADULT - PLAN OF CARE
Improved function and pain control The patient will be seen in the office in 3 weeks for wound check. Sutures/Staples/Tape will be removed at that time. Patient may shower after post-op day #3 (4/22/18). The dressing is to be removed on post op day #9 (4/28/18). IF THE DRESSING BECOMES SOILED BEFORE THE REMOVAL DATE, CHANGE WITH A SIMILAR DRESSING. IF THE DRESSING BECOMES STAINED WITH DISCHARGE, CONTACT THE OFFICE FOR FURTHER DIRECTIONS. The patient will contact the office if the wound becomes red, has increasing pain, develops bleeding or discharge, an injury occurs, or has other concerns. The patient will continue PT consistent with total knee replacement. The patient will continue aspirin 325mg twice daily for 6 weeks for blood clot prevention. The patient will take OXYCODONE AND TYLENOL for pain control and titrate according to prescription and patient needs. The patient will take Senna-S while taking oxycodone to prevent narcotic associated constipation.  Additionally, increase water intake (drink at least 8 glasses of water daily) and try adding fiber to the diet by eating fruits, vegetables and foods that are rich in grains. If constipation is experienced, contact the medical/primary care provider to discuss further treatment options. The patient is FULL weight bearing. Elevation of the lower leg is recommended to reduce swelling. The patient will be seen in the office in 3 weeks for wound check. Tape will be removed at that time. Patient may shower after post-op day #3 (4/22/18). The dressing is to be removed on post op day #9 (4/28/18). IF THE DRESSING BECOMES SOILED BEFORE THE REMOVAL DATE, CHANGE WITH A SIMILAR DRESSING. IF THE DRESSING BECOMES STAINED WITH DISCHARGE, CONTACT THE OFFICE FOR FURTHER DIRECTIONS. The patient will contact the office if the wound becomes red, has increasing pain, develops bleeding or discharge, an injury occurs, or has other concerns. The patient will continue PT consistent with total knee replacement. The patient will continue aspirin 325mg twice daily for 6 weeks for blood clot prevention. The patient will take OXYCODONE AND TYLENOL for pain control and titrate according to prescription and patient needs. The patient will take Senna-S while taking oxycodone to prevent narcotic associated constipation.  Additionally, increase water intake (drink at least 8 glasses of water daily) and try adding fiber to the diet by eating fruits, vegetables and foods that are rich in grains. If constipation is experienced, contact the medical/primary care provider to discuss further treatment options. The patient is FULL weight bearing. Elevation of the lower leg is recommended to reduce swelling.

## 2018-04-19 NOTE — PHYSICAL THERAPY INITIAL EVALUATION ADULT - ADDITIONAL COMMENTS
Pt lives in a private home with her daughter and grandchildren. 1 step to enter, no steps inside. Pt was independent PTA without assist device. Pt owns RW, SAC, and shower chair.

## 2018-04-19 NOTE — DISCHARGE NOTE ADULT - CARE PLAN
Principal Discharge DX:	Osteoarthritis  Goal:	Improved function and pain control  Assessment and plan of treatment:	The patient will be seen in the office in 3 weeks for wound check. Sutures/Staples/Tape will be removed at that time. Patient may shower after post-op day #3 (4/22/18). The dressing is to be removed on post op day #9 (4/28/18). IF THE DRESSING BECOMES SOILED BEFORE THE REMOVAL DATE, CHANGE WITH A SIMILAR DRESSING. IF THE DRESSING BECOMES STAINED WITH DISCHARGE, CONTACT THE OFFICE FOR FURTHER DIRECTIONS. The patient will contact the office if the wound becomes red, has increasing pain, develops bleeding or discharge, an injury occurs, or has other concerns. The patient will continue PT consistent with total knee replacement. The patient will continue aspirin 325mg twice daily for 6 weeks for blood clot prevention. The patient will take OXYCODONE AND TYLENOL for pain control and titrate according to prescription and patient needs. The patient will take Senna-S while taking oxycodone to prevent narcotic associated constipation.  Additionally, increase water intake (drink at least 8 glasses of water daily) and try adding fiber to the diet by eating fruits, vegetables and foods that are rich in grains. If constipation is experienced, contact the medical/primary care provider to discuss further treatment options. The patient is FULL weight bearing. Elevation of the lower leg is recommended to reduce swelling. Principal Discharge DX:	Osteoarthritis  Goal:	Improved function and pain control  Assessment and plan of treatment:	The patient will be seen in the office in 3 weeks for wound check. Tape will be removed at that time. Patient may shower after post-op day #3 (4/22/18). The dressing is to be removed on post op day #9 (4/28/18). IF THE DRESSING BECOMES SOILED BEFORE THE REMOVAL DATE, CHANGE WITH A SIMILAR DRESSING. IF THE DRESSING BECOMES STAINED WITH DISCHARGE, CONTACT THE OFFICE FOR FURTHER DIRECTIONS. The patient will contact the office if the wound becomes red, has increasing pain, develops bleeding or discharge, an injury occurs, or has other concerns. The patient will continue PT consistent with total knee replacement. The patient will continue aspirin 325mg twice daily for 6 weeks for blood clot prevention. The patient will take OXYCODONE AND TYLENOL for pain control and titrate according to prescription and patient needs. The patient will take Senna-S while taking oxycodone to prevent narcotic associated constipation.  Additionally, increase water intake (drink at least 8 glasses of water daily) and try adding fiber to the diet by eating fruits, vegetables and foods that are rich in grains. If constipation is experienced, contact the medical/primary care provider to discuss further treatment options. The patient is FULL weight bearing. Elevation of the lower leg is recommended to reduce swelling.

## 2018-04-19 NOTE — DISCHARGE NOTE ADULT - FINDINGS/TREATMENT
Current outpatient prescriptions:   •  HYDRALAZINE  MG Oral Tab, TAKE ONE TABLET BY MOUTH TWO TIMES A DAY, Disp: 180 tablet, Rfl: 0  •
Pending medication for Dr. Yannick Culp approval
see op note

## 2018-04-20 LAB
ANION GAP SERPL CALC-SCNC: 7 MMOL/L — SIGNIFICANT CHANGE UP (ref 5–17)
BUN SERPL-MCNC: 18 MG/DL — SIGNIFICANT CHANGE UP (ref 8–20)
CALCIUM SERPL-MCNC: 8 MG/DL — LOW (ref 8.6–10.2)
CHLORIDE SERPL-SCNC: 97 MMOL/L — LOW (ref 98–107)
CO2 SERPL-SCNC: 27 MMOL/L — SIGNIFICANT CHANGE UP (ref 22–29)
CREAT SERPL-MCNC: 0.34 MG/DL — LOW (ref 0.5–1.3)
GLUCOSE SERPL-MCNC: 105 MG/DL — SIGNIFICANT CHANGE UP (ref 70–115)
HCT VFR BLD CALC: 26.6 % — LOW (ref 37–47)
HGB BLD-MCNC: 8.3 G/DL — LOW (ref 12–16)
MCHC RBC-ENTMCNC: 28.5 PG — SIGNIFICANT CHANGE UP (ref 27–31)
MCHC RBC-ENTMCNC: 31.2 G/DL — LOW (ref 32–36)
MCV RBC AUTO: 91.4 FL — SIGNIFICANT CHANGE UP (ref 81–99)
PLATELET # BLD AUTO: 208 K/UL — SIGNIFICANT CHANGE UP (ref 150–400)
POTASSIUM SERPL-MCNC: 5 MMOL/L — SIGNIFICANT CHANGE UP (ref 3.5–5.3)
POTASSIUM SERPL-SCNC: 5 MMOL/L — SIGNIFICANT CHANGE UP (ref 3.5–5.3)
RBC # BLD: 2.91 M/UL — LOW (ref 4.4–5.2)
RBC # FLD: 12.6 % — SIGNIFICANT CHANGE UP (ref 11–15.6)
SODIUM SERPL-SCNC: 131 MMOL/L — LOW (ref 135–145)
WBC # BLD: 7.7 K/UL — SIGNIFICANT CHANGE UP (ref 4.8–10.8)
WBC # FLD AUTO: 7.7 K/UL — SIGNIFICANT CHANGE UP (ref 4.8–10.8)

## 2018-04-20 PROCEDURE — 99233 SBSQ HOSP IP/OBS HIGH 50: CPT

## 2018-04-20 RX ORDER — ASPIRIN/CALCIUM CARB/MAGNESIUM 324 MG
1 TABLET ORAL
Qty: 84 | Refills: 0 | OUTPATIENT
Start: 2018-04-20 | End: 2018-05-31

## 2018-04-20 RX ORDER — ASPIRIN/CALCIUM CARB/MAGNESIUM 324 MG
1 TABLET ORAL
Qty: 0 | Refills: 0 | COMMUNITY

## 2018-04-20 RX ORDER — SODIUM CHLORIDE 9 MG/ML
1000 INJECTION INTRAMUSCULAR; INTRAVENOUS; SUBCUTANEOUS
Qty: 0 | Refills: 0 | Status: DISCONTINUED | OUTPATIENT
Start: 2018-04-20 | End: 2018-04-21

## 2018-04-20 RX ORDER — OXYCODONE HYDROCHLORIDE 5 MG/1
1 TABLET ORAL
Qty: 42 | Refills: 0 | OUTPATIENT
Start: 2018-04-20 | End: 2018-04-26

## 2018-04-20 RX ORDER — SENNOSIDES/DOCUSATE SODIUM 8.6MG-50MG
2 TABLET ORAL
Qty: 28 | Refills: 0 | OUTPATIENT
Start: 2018-04-20 | End: 2018-05-03

## 2018-04-20 RX ADMIN — Medication 100 MILLIGRAM(S): at 13:44

## 2018-04-20 RX ADMIN — Medication 15 MILLIGRAM(S): at 00:47

## 2018-04-20 RX ADMIN — OXYCODONE HYDROCHLORIDE 10 MILLIGRAM(S): 5 TABLET ORAL at 05:17

## 2018-04-20 RX ADMIN — OXYCODONE HYDROCHLORIDE 10 MILLIGRAM(S): 5 TABLET ORAL at 18:20

## 2018-04-20 RX ADMIN — Medication 325 MILLIGRAM(S): at 05:17

## 2018-04-20 RX ADMIN — Medication 100 MILLIGRAM(S): at 21:02

## 2018-04-20 RX ADMIN — Medication 975 MILLIGRAM(S): at 18:42

## 2018-04-20 RX ADMIN — Medication 975 MILLIGRAM(S): at 13:20

## 2018-04-20 RX ADMIN — OXYCODONE HYDROCHLORIDE 10 MILLIGRAM(S): 5 TABLET ORAL at 18:42

## 2018-04-20 RX ADMIN — Medication 15 MILLIGRAM(S): at 00:46

## 2018-04-20 RX ADMIN — PANTOPRAZOLE SODIUM 40 MILLIGRAM(S): 20 TABLET, DELAYED RELEASE ORAL at 05:17

## 2018-04-20 RX ADMIN — CITALOPRAM 10 MILLIGRAM(S): 10 TABLET, FILM COATED ORAL at 12:24

## 2018-04-20 RX ADMIN — OXYCODONE HYDROCHLORIDE 10 MILLIGRAM(S): 5 TABLET ORAL at 05:10

## 2018-04-20 RX ADMIN — SODIUM CHLORIDE 100 MILLILITER(S): 9 INJECTION INTRAMUSCULAR; INTRAVENOUS; SUBCUTANEOUS at 21:03

## 2018-04-20 RX ADMIN — OXYCODONE HYDROCHLORIDE 10 MILLIGRAM(S): 5 TABLET ORAL at 17:11

## 2018-04-20 RX ADMIN — Medication 975 MILLIGRAM(S): at 05:17

## 2018-04-20 RX ADMIN — Medication 975 MILLIGRAM(S): at 17:12

## 2018-04-20 RX ADMIN — OXYCODONE HYDROCHLORIDE 10 MILLIGRAM(S): 5 TABLET ORAL at 12:22

## 2018-04-20 RX ADMIN — Medication 1 TABLET(S): at 12:24

## 2018-04-20 RX ADMIN — LEVETIRACETAM 750 MILLIGRAM(S): 250 TABLET, FILM COATED ORAL at 21:03

## 2018-04-20 RX ADMIN — SODIUM CHLORIDE 100 MILLILITER(S): 9 INJECTION, SOLUTION INTRAVENOUS at 06:38

## 2018-04-20 RX ADMIN — Medication 325 MILLIGRAM(S): at 12:24

## 2018-04-20 RX ADMIN — OXYCODONE HYDROCHLORIDE 10 MILLIGRAM(S): 5 TABLET ORAL at 13:20

## 2018-04-20 RX ADMIN — Medication 975 MILLIGRAM(S): at 00:47

## 2018-04-20 RX ADMIN — Medication 1 MILLIGRAM(S): at 12:23

## 2018-04-20 RX ADMIN — Medication 975 MILLIGRAM(S): at 00:46

## 2018-04-20 RX ADMIN — PRIMIDONE 100 MILLIGRAM(S): 250 TABLET ORAL at 21:02

## 2018-04-20 RX ADMIN — Medication 325 MILLIGRAM(S): at 17:11

## 2018-04-20 RX ADMIN — OXYCODONE HYDROCHLORIDE 10 MILLIGRAM(S): 5 TABLET ORAL at 04:35

## 2018-04-20 RX ADMIN — Medication 975 MILLIGRAM(S): at 18:20

## 2018-04-20 RX ADMIN — Medication 100 MILLIGRAM(S): at 00:45

## 2018-04-20 RX ADMIN — Medication 100 MILLIGRAM(S): at 05:17

## 2018-04-20 RX ADMIN — ONDANSETRON 4 MILLIGRAM(S): 8 TABLET, FILM COATED ORAL at 04:30

## 2018-04-20 RX ADMIN — Medication 975 MILLIGRAM(S): at 12:22

## 2018-04-20 NOTE — PROGRESS NOTE ADULT - SUBJECTIVE AND OBJECTIVE BOX
ADEN RUDY    [id number]    77y      Female    CC: s/p right Total knee replacement POD#1      INTERVAL HPI/OVERNIGHT EVENTS: no acute events    REVIEW OF SYSTEMS:    CONSTITUTIONAL: No fever, weight loss, or fatigue  RESPIRATORY: No cough, wheezing, hemoptysis; No shortness of breath  CARDIOVASCULAR: No chest pain, palpitations  GASTROINTESTINAL: No abdominal or epigastric pain. No nausea, vomiting        Vital Signs Last 24 Hrs  T(C): 36.8 (20 Apr 2018 08:25), Max: 36.8 (19 Apr 2018 12:15)  T(F): 98.2 (20 Apr 2018 08:25), Max: 98.3 (19 Apr 2018 21:12)  HR: 87 (20 Apr 2018 08:25) (66 - 94)  BP: 120/73 (20 Apr 2018 08:25) (114/74 - 158/83)  BP(mean): --  RR: 18 (20 Apr 2018 08:25) (10 - 19)  SpO2: 95% (20 Apr 2018 08:25) (92% - 100%)    PHYSICAL EXAM:    GENERAL: NAD, well-groomed  HEENT: PERRL, +EOMI  NECK: soft, Supple  CHEST/LUNG: Clear to percussion bilaterally; No wheezing  HEART: S1S2+, Regular rate and rhythm; No murmurs  ABDOMEN: Soft, Nontender, Nondistended; Bowel sounds present  EXTREMITIES:  No clubbing, cyanosis, or edema  SKIN: No rashes or lesions  NEURO: AAOX3      04-19 @ 07:01 - 04-20 @ 07:00  --------------------------------------------------------  IN: 2340 mL / OUT: 200 mL / NET: 2140 mL    04-20 @ 07:01 - 04-20 @ 11:49  --------------------------------------------------------  IN: 360 mL / OUT: 0 mL / NET: 360 mL        LABS:                        8.3    7.7   )-----------( 208      ( 20 Apr 2018 07:13 )             26.6     04-20    131<L>  |  97<L>  |  18.0  ----------------------------<  105  5.0   |  27.0  |  0.34<L>    Ca    8.0<L>      20 Apr 2018 07:13              MEDICATIONS  (STANDING):  acetaminophen   Tablet. 975 milliGRAM(s) Oral every 6 hours  aspirin enteric coated 325 milliGRAM(s) Oral two times a day  citalopram 10 milliGRAM(s) Oral daily  docusate sodium 100 milliGRAM(s) Oral three times a day  ferrous    sulfate 325 milliGRAM(s) Oral daily  folic acid 1 milliGRAM(s) Oral daily  levETIRAcetam 750 milliGRAM(s) Oral at bedtime  multivitamin 1 Tablet(s) Oral daily  oxyCODONE  ER Tablet 10 milliGRAM(s) Oral every 12 hours  pantoprazole    Tablet 40 milliGRAM(s) Oral before breakfast  primidone 100 milliGRAM(s) Oral daily    MEDICATIONS  (PRN):  acetaminophen   Tablet 650 milliGRAM(s) Oral every 6 hours PRN For Temp over 38.3 C (100.94 F)  aluminum hydroxide/magnesium hydroxide/simethicone Suspension 30 milliLiter(s) Oral four times a day PRN Indigestion  magnesium hydroxide Suspension 30 milliLiter(s) Oral daily PRN Constipation  morphine  - Injectable 2 milliGRAM(s) IV Push every 4 hours PRN Severe Pain/breakthrough pain  ondansetron Injectable 4 milliGRAM(s) IV Push every 6 hours PRN Nausea and/or Vomiting  oxyCODONE    IR 5 milliGRAM(s) Oral every 4 hours PRN Mild Pain  oxyCODONE    IR 10 milliGRAM(s) Oral every 4 hours PRN Moderate Pain  senna 2 Tablet(s) Oral at bedtime PRN Constipation      RADIOLOGY & ADDITIONAL TESTS: ADEN GRANT    [id number]    77y      Female    CC: s/p right Total knee replacement POD#1  felt tired earlier today, feels better now, nausea has improved too.       INTERVAL HPI/OVERNIGHT EVENTS: no acute events    REVIEW OF SYSTEMS:    CONSTITUTIONAL: No fever, weight loss, or fatigue  RESPIRATORY: No cough, wheezing, hemoptysis; No shortness of breath  CARDIOVASCULAR: No chest pain, palpitations  GASTROINTESTINAL: No abdominal or epigastric pain. No nausea, vomiting        Vital Signs Last 24 Hrs  T(C): 36.8 (20 Apr 2018 08:25), Max: 36.8 (19 Apr 2018 12:15)  T(F): 98.2 (20 Apr 2018 08:25), Max: 98.3 (19 Apr 2018 21:12)  HR: 87 (20 Apr 2018 08:25) (66 - 94)  BP: 120/73 (20 Apr 2018 08:25) (114/74 - 158/83)  BP(mean): --  RR: 18 (20 Apr 2018 08:25) (10 - 19)  SpO2: 95% (20 Apr 2018 08:25) (92% - 100%)    PHYSICAL EXAM:    GENERAL: NAD, well-groomed  HEENT: PERRL, +EOMI  NECK: soft, Supple  CHEST/LUNG: Clear to percussion bilaterally; No wheezing  HEART: S1S2+, Regular rate and rhythm; No murmurs  ABDOMEN: Soft, Nontender, Nondistended; Bowel sounds present  EXTREMITIES:  No clubbing, cyanosis, or edema  SKIN: No rashes or lesions  NEURO: AAOX3      04-19 @ 07:01 - 04-20 @ 07:00  --------------------------------------------------------  IN: 2340 mL / OUT: 200 mL / NET: 2140 mL    04-20 @ 07:01 - 04-20 @ 11:49  --------------------------------------------------------  IN: 360 mL / OUT: 0 mL / NET: 360 mL        LABS:                        8.3    7.7   )-----------( 208      ( 20 Apr 2018 07:13 )             26.6     04-20    131<L>  |  97<L>  |  18.0  ----------------------------<  105  5.0   |  27.0  |  0.34<L>    Ca    8.0<L>      20 Apr 2018 07:13              MEDICATIONS  (STANDING):  acetaminophen   Tablet. 975 milliGRAM(s) Oral every 6 hours  aspirin enteric coated 325 milliGRAM(s) Oral two times a day  citalopram 10 milliGRAM(s) Oral daily  docusate sodium 100 milliGRAM(s) Oral three times a day  ferrous    sulfate 325 milliGRAM(s) Oral daily  folic acid 1 milliGRAM(s) Oral daily  levETIRAcetam 750 milliGRAM(s) Oral at bedtime  multivitamin 1 Tablet(s) Oral daily  oxyCODONE  ER Tablet 10 milliGRAM(s) Oral every 12 hours  pantoprazole    Tablet 40 milliGRAM(s) Oral before breakfast  primidone 100 milliGRAM(s) Oral daily    MEDICATIONS  (PRN):  acetaminophen   Tablet 650 milliGRAM(s) Oral every 6 hours PRN For Temp over 38.3 C (100.94 F)  aluminum hydroxide/magnesium hydroxide/simethicone Suspension 30 milliLiter(s) Oral four times a day PRN Indigestion  magnesium hydroxide Suspension 30 milliLiter(s) Oral daily PRN Constipation  morphine  - Injectable 2 milliGRAM(s) IV Push every 4 hours PRN Severe Pain/breakthrough pain  ondansetron Injectable 4 milliGRAM(s) IV Push every 6 hours PRN Nausea and/or Vomiting  oxyCODONE    IR 5 milliGRAM(s) Oral every 4 hours PRN Mild Pain  oxyCODONE    IR 10 milliGRAM(s) Oral every 4 hours PRN Moderate Pain  senna 2 Tablet(s) Oral at bedtime PRN Constipation      RADIOLOGY & ADDITIONAL TESTS:

## 2018-04-20 NOTE — PROGRESS NOTE ADULT - SUBJECTIVE AND OBJECTIVE BOX
ADEN EMIVAN    [id number]    History: 77y Female is status post right total knee arthroplasty POD#1. Patient is doing well and is comfortable. The patient's pain is controlled using the prescribed pain medications. The patient is participating in physical therapy. Denies nausea, vomiting, chest pain, shortness of breath, abdominal pain or fever. No new complaints.              MEDICATIONS  (STANDING):  acetaminophen   Tablet. 975 milliGRAM(s) Oral every 6 hours  aspirin enteric coated 325 milliGRAM(s) Oral two times a day  citalopram 10 milliGRAM(s) Oral daily  docusate sodium 100 milliGRAM(s) Oral three times a day  ferrous    sulfate 325 milliGRAM(s) Oral daily  folic acid 1 milliGRAM(s) Oral daily  lactated ringers. 1000 milliLiter(s) (100 mL/Hr) IV Continuous <Continuous>  levETIRAcetam 750 milliGRAM(s) Oral at bedtime  multivitamin 1 Tablet(s) Oral daily  oxyCODONE  ER Tablet 10 milliGRAM(s) Oral every 12 hours  pantoprazole    Tablet 40 milliGRAM(s) Oral before breakfast  primidone 100 milliGRAM(s) Oral daily    MEDICATIONS  (PRN):  acetaminophen   Tablet 650 milliGRAM(s) Oral every 6 hours PRN For Temp over 38.3 C (100.94 F)  aluminum hydroxide/magnesium hydroxide/simethicone Suspension 30 milliLiter(s) Oral four times a day PRN Indigestion  magnesium hydroxide Suspension 30 milliLiter(s) Oral daily PRN Constipation  morphine  - Injectable 2 milliGRAM(s) IV Push every 4 hours PRN Severe Pain/breakthrough pain  ondansetron Injectable 4 milliGRAM(s) IV Push every 6 hours PRN Nausea and/or Vomiting  oxyCODONE    IR 5 milliGRAM(s) Oral every 4 hours PRN Mild Pain  oxyCODONE    IR 10 milliGRAM(s) Oral every 4 hours PRN Moderate Pain  senna 2 Tablet(s) Oral at bedtime PRN Constipation      Physical exam: The right knee dressing is clean, dry and intact. No drainage or discharge. No erythema is noted. No blistering. No ecchymosis. The calf is supple nontender. Sensation to light touch is grossly intact distally. Motor function distally is 5/5. Extensor hallucis longus and flexor hallucis longus are intact. No foot drop. 2+ dorsalis pedis pulse. Capillary refill is less than 2 seconds. No cyanosis.    Primary Orthopedic Assessment:  • s/p RIGHT total knee replacement    Secondary  Orthopedic Assessment(s):   •     Secondary  Medical Assessment(s):   •     Plan:   • DVT prophylaxis as prescribed, including use of compression devices and ankle pumps  • Continue physical therapy  • Weightbearing as tolerated of the right lower extremity with assistance of a walker  • Incentive spirometry encouraged  • Pain control as clinically indicated  • Discharge planning – anticipated discharge is Home

## 2018-04-20 NOTE — PROGRESS NOTE ADULT - ASSESSMENT
77 year old female with a history of depression, GERD, and seizures (last seizure 2013), Migraines presents with osteoarthritis of the right knee scheduled for a right total knee replacement. She is s/p right Total knee replacement POD#1    Rt knee primary OA  - s/p rt TKR    Rt knee hip replacement - Pain control   Bowel regimen   Incentive spirometry  DVT px - per ortho   PT    Seizures - Ct keppra at home dose    Migraines - well controlled   sumatriptan PRN     Anemia - ? cause  monitor   Labs in am,     Hyponatremia 77 year old female with a history of depression, GERD, and seizures (last seizure 2013), Migraines presents with osteoarthritis of the right knee scheduled for a right total knee replacement. She is s/p right Total knee replacement POD#1    Rt knee primary OA  - s/p rt TKR    Rt knee hip replacement - Pain control   Bowel regimen   Incentive spirometry  DVT px - per ortho   PT    Seizures - Ct keppra at home dose    Migraines - well controlled   sumatriptan PRN     Anemia - Acute blood loss anemia - post surgical on possibly chronic anemia - ? cause  monitor   Labs in am,     Hyponatremia   Suspect hypovolemic - Give iv NS  repeat labs in am     Dr Aceves will follow in am

## 2018-04-21 VITALS
TEMPERATURE: 98 F | HEART RATE: 80 BPM | SYSTOLIC BLOOD PRESSURE: 120 MMHG | RESPIRATION RATE: 16 BRPM | DIASTOLIC BLOOD PRESSURE: 70 MMHG

## 2018-04-21 LAB
ANION GAP SERPL CALC-SCNC: 9 MMOL/L — SIGNIFICANT CHANGE UP (ref 5–17)
BUN SERPL-MCNC: 12 MG/DL — SIGNIFICANT CHANGE UP (ref 8–20)
CALCIUM SERPL-MCNC: 8.2 MG/DL — LOW (ref 8.6–10.2)
CHLORIDE SERPL-SCNC: 97 MMOL/L — LOW (ref 98–107)
CO2 SERPL-SCNC: 26 MMOL/L — SIGNIFICANT CHANGE UP (ref 22–29)
CREAT SERPL-MCNC: 0.35 MG/DL — LOW (ref 0.5–1.3)
GLUCOSE SERPL-MCNC: 97 MG/DL — SIGNIFICANT CHANGE UP (ref 70–115)
HCT VFR BLD CALC: 22.9 % — LOW (ref 37–47)
HGB BLD-MCNC: 7.3 G/DL — LOW (ref 12–16)
MCHC RBC-ENTMCNC: 28.5 PG — SIGNIFICANT CHANGE UP (ref 27–31)
MCHC RBC-ENTMCNC: 31.9 G/DL — LOW (ref 32–36)
MCV RBC AUTO: 89.5 FL — SIGNIFICANT CHANGE UP (ref 81–99)
PLATELET # BLD AUTO: 170 K/UL — SIGNIFICANT CHANGE UP (ref 150–400)
POTASSIUM SERPL-MCNC: 4.2 MMOL/L — SIGNIFICANT CHANGE UP (ref 3.5–5.3)
POTASSIUM SERPL-SCNC: 4.2 MMOL/L — SIGNIFICANT CHANGE UP (ref 3.5–5.3)
RBC # BLD: 2.56 M/UL — LOW (ref 4.4–5.2)
RBC # FLD: 12.5 % — SIGNIFICANT CHANGE UP (ref 11–15.6)
SODIUM SERPL-SCNC: 132 MMOL/L — LOW (ref 135–145)
WBC # BLD: 4.8 K/UL — SIGNIFICANT CHANGE UP (ref 4.8–10.8)
WBC # FLD AUTO: 4.8 K/UL — SIGNIFICANT CHANGE UP (ref 4.8–10.8)

## 2018-04-21 PROCEDURE — 97116 GAIT TRAINING THERAPY: CPT

## 2018-04-21 PROCEDURE — 88311 DECALCIFY TISSUE: CPT

## 2018-04-21 PROCEDURE — 73560 X-RAY EXAM OF KNEE 1 OR 2: CPT

## 2018-04-21 PROCEDURE — 88305 TISSUE EXAM BY PATHOLOGIST: CPT

## 2018-04-21 PROCEDURE — 80048 BASIC METABOLIC PNL TOTAL CA: CPT

## 2018-04-21 PROCEDURE — 99233 SBSQ HOSP IP/OBS HIGH 50: CPT

## 2018-04-21 PROCEDURE — 97163 PT EVAL HIGH COMPLEX 45 MIN: CPT

## 2018-04-21 PROCEDURE — C1713: CPT

## 2018-04-21 PROCEDURE — 85027 COMPLETE CBC AUTOMATED: CPT

## 2018-04-21 PROCEDURE — 86901 BLOOD TYPING SEROLOGIC RH(D): CPT

## 2018-04-21 PROCEDURE — P9016: CPT

## 2018-04-21 PROCEDURE — 86900 BLOOD TYPING SEROLOGIC ABO: CPT

## 2018-04-21 PROCEDURE — 97110 THERAPEUTIC EXERCISES: CPT

## 2018-04-21 PROCEDURE — C1776: CPT

## 2018-04-21 PROCEDURE — 86850 RBC ANTIBODY SCREEN: CPT

## 2018-04-21 PROCEDURE — 36415 COLL VENOUS BLD VENIPUNCTURE: CPT

## 2018-04-21 PROCEDURE — 86923 COMPATIBILITY TEST ELECTRIC: CPT

## 2018-04-21 PROCEDURE — 36430 TRANSFUSION BLD/BLD COMPNT: CPT

## 2018-04-21 RX ADMIN — OXYCODONE HYDROCHLORIDE 10 MILLIGRAM(S): 5 TABLET ORAL at 00:31

## 2018-04-21 RX ADMIN — Medication 1 TABLET(S): at 11:19

## 2018-04-21 RX ADMIN — OXYCODONE HYDROCHLORIDE 10 MILLIGRAM(S): 5 TABLET ORAL at 05:37

## 2018-04-21 RX ADMIN — Medication 100 MILLIGRAM(S): at 11:20

## 2018-04-21 RX ADMIN — Medication 975 MILLIGRAM(S): at 11:19

## 2018-04-21 RX ADMIN — CITALOPRAM 10 MILLIGRAM(S): 10 TABLET, FILM COATED ORAL at 11:19

## 2018-04-21 RX ADMIN — Medication 1 MILLIGRAM(S): at 11:19

## 2018-04-21 RX ADMIN — Medication 975 MILLIGRAM(S): at 05:36

## 2018-04-21 RX ADMIN — OXYCODONE HYDROCHLORIDE 10 MILLIGRAM(S): 5 TABLET ORAL at 01:00

## 2018-04-21 RX ADMIN — PANTOPRAZOLE SODIUM 40 MILLIGRAM(S): 20 TABLET, DELAYED RELEASE ORAL at 05:26

## 2018-04-21 RX ADMIN — Medication 325 MILLIGRAM(S): at 11:19

## 2018-04-21 RX ADMIN — Medication 975 MILLIGRAM(S): at 05:25

## 2018-04-21 RX ADMIN — Medication 325 MILLIGRAM(S): at 16:47

## 2018-04-21 RX ADMIN — Medication 325 MILLIGRAM(S): at 05:26

## 2018-04-21 RX ADMIN — ONDANSETRON 4 MILLIGRAM(S): 8 TABLET, FILM COATED ORAL at 00:41

## 2018-04-21 RX ADMIN — OXYCODONE HYDROCHLORIDE 5 MILLIGRAM(S): 5 TABLET ORAL at 07:50

## 2018-04-21 RX ADMIN — OXYCODONE HYDROCHLORIDE 10 MILLIGRAM(S): 5 TABLET ORAL at 05:25

## 2018-04-21 RX ADMIN — OXYCODONE HYDROCHLORIDE 5 MILLIGRAM(S): 5 TABLET ORAL at 16:46

## 2018-04-21 RX ADMIN — Medication 100 MILLIGRAM(S): at 05:26

## 2018-04-21 NOTE — PROGRESS NOTE ADULT - SUBJECTIVE AND OBJECTIVE BOX
Patient seen and examined . S/p R TKA   , POD # 2    CC : R knee pain     PAST MEDICAL & SURGICAL HISTORY:  Breast mass, right  Baker's cyst of knee: bilateral  Tremor of both hands  Cataract  Osteoporosis  Migraines  Seizures: Last episode Oct 2013  Depression  GERD (gastroesophageal reflux disease)  Osteoarthritis  History of total left knee replacement  Right wrist fracture: may 2014  H/O right breast biopsy      MEDICATIONS  (STANDING):  acetaminophen   Tablet. 975 milliGRAM(s) Oral every 6 hours  aspirin enteric coated 325 milliGRAM(s) Oral two times a day  citalopram 10 milliGRAM(s) Oral daily  docusate sodium 100 milliGRAM(s) Oral three times a day  ferrous    sulfate 325 milliGRAM(s) Oral daily  folic acid 1 milliGRAM(s) Oral daily  levETIRAcetam 750 milliGRAM(s) Oral at bedtime  multivitamin 1 Tablet(s) Oral daily  oxyCODONE  ER Tablet 10 milliGRAM(s) Oral every 12 hours  pantoprazole    Tablet 40 milliGRAM(s) Oral before breakfast  primidone 100 milliGRAM(s) Oral daily  sodium chloride 0.9%. 1000 milliLiter(s) (100 mL/Hr) IV Continuous <Continuous>    MEDICATIONS  (PRN):  acetaminophen   Tablet 650 milliGRAM(s) Oral every 6 hours PRN For Temp over 38.3 C (100.94 F)  aluminum hydroxide/magnesium hydroxide/simethicone Suspension 30 milliLiter(s) Oral four times a day PRN Indigestion  magnesium hydroxide Suspension 30 milliLiter(s) Oral daily PRN Constipation  morphine  - Injectable 2 milliGRAM(s) IV Push every 4 hours PRN Severe Pain/breakthrough pain  ondansetron Injectable 4 milliGRAM(s) IV Push every 6 hours PRN Nausea and/or Vomiting  oxyCODONE    IR 5 milliGRAM(s) Oral every 4 hours PRN Mild Pain  oxyCODONE    IR 10 milliGRAM(s) Oral every 4 hours PRN Moderate Pain  senna 2 Tablet(s) Oral at bedtime PRN Constipation      LABS:                          7.3    4.8   )-----------( 170      ( 21 Apr 2018 07:45 )             22.9     04-21    132<L>  |  97<L>  |  12.0  ----------------------------<  97  4.2   |  26.0  |  0.35<L>    Ca    8.2<L>      21 Apr 2018 07:45      RADIOLOGY & ADDITIONAL TESTS:    < from: Xray Knee 1 or 2 Views, Right (04.19.18 @ 12:18) >     EXAM:  KNEE-RIGHT                          PROCEDURE DATE:  04/19/2018          INTERPRETATION:  Right knee    Portable AP and lateral views.    Postoperative evaluation. Status post right knee replacement.    The prosthetic components are in goodposition. The alignment is normal.   Gas and fluid is present in the suprapatellar recess.    Impression: Right total knee replacement in good position                NILS MIRANDA M.D., ATTENDING RADIOLOGIST  This document has been electronically signed. Apr 19 2018 12:39PM    < end of copied text >        REVIEW OF SYSTEMS:    CONSTITUTIONAL: No fever, weight loss, or fatigue  EYES: No eye pain, visual disturbances, or discharge  ENMT:  No difficulty hearing, tinnitus, vertigo; No sinus or throat pain  NECK: No pain or stiffness  RESPIRATORY: No cough, wheezing, chills or hemoptysis; No shortness of breath  CARDIOVASCULAR: No chest pain, palpitations, dizziness, or leg swelling  GASTROINTESTINAL: No abdominal or epigastric pain. No nausea, vomiting, or hematemesis; No diarrhea or constipation. No melena or hematochezia.  GENITOURINARY: No dysuria, frequency, hematuria, or incontinence  NEUROLOGICAL: No headaches, memory loss, loss of strength, numbness, or tremors  SKIN: No itching, burning, rashes, or lesions   LYMPH NODES: No enlarged glands  ENDOCRINE: No heat or cold intolerance; No hair loss  MUSCULOSKELETAL: R knee pain   PSYCHIATRIC: No depression, anxiety, mood swings, or difficulty sleeping  HEME/LYMPH: No easy bruising, or bleeding gums  ALLERGY AND IMMUNOLOGIC: No hives or eczema    Vital Signs Last 24 Hrs  T(C): 37.4 (21 Apr 2018 09:55), Max: 37.4 (21 Apr 2018 09:55)  T(F): 99.4 (21 Apr 2018 09:55), Max: 99.4 (21 Apr 2018 09:55)  HR: 89 (21 Apr 2018 09:55) (78 - 98)  BP: 127/78 (21 Apr 2018 09:55) (116/67 - 154/81)  BP(mean): --  RR: 18 (21 Apr 2018 09:55) (18 - 18)  SpO2: 95% (21 Apr 2018 09:55) (95% - 96%)  PHYSICAL EXAM:    GENERAL: NAD, well-groomed, well-developed  HEAD:  Atraumatic, Normocephalic  EYES: EOMI, PERRLA, conjunctiva and sclera clear  NECK: Supple, No JVD, Normal thyroid  NERVOUS SYSTEM:  Alert & Oriented X3, no focal deficit  CHEST/LUNG: CTA b/l ,  no  rales, rhonchi, wheezing, or rubs  HEART: Regular rate and rhythm; No murmurs, rubs, or gallops  ABDOMEN: Soft, Nontender, Nondistended; Bowel sounds present  EXTREMITIES:  2+ Peripheral Pulses, No clubbing, cyanosis, or edema , R knee dressing + , clean dry and dry .  LYMPH: No lymphadenopathy noted  SKIN: No rashes or lesions Patient seen and examined . S/p R TKA   , POD # 2 , pain well controlled , no sob , no dizziness , no other complaints .    CC : R knee pain     PAST MEDICAL & SURGICAL HISTORY:  Breast mass, right  Baker's cyst of knee: bilateral  Tremor of both hands  Cataract  Osteoporosis  Migraines  Seizures: Last episode Oct 2013  Depression  GERD (gastroesophageal reflux disease)  Osteoarthritis  History of total left knee replacement  Right wrist fracture: may 2014  H/O right breast biopsy      MEDICATIONS  (STANDING):  acetaminophen   Tablet. 975 milliGRAM(s) Oral every 6 hours  aspirin enteric coated 325 milliGRAM(s) Oral two times a day  citalopram 10 milliGRAM(s) Oral daily  docusate sodium 100 milliGRAM(s) Oral three times a day  ferrous    sulfate 325 milliGRAM(s) Oral daily  folic acid 1 milliGRAM(s) Oral daily  levETIRAcetam 750 milliGRAM(s) Oral at bedtime  multivitamin 1 Tablet(s) Oral daily  oxyCODONE  ER Tablet 10 milliGRAM(s) Oral every 12 hours  pantoprazole    Tablet 40 milliGRAM(s) Oral before breakfast  primidone 100 milliGRAM(s) Oral daily  sodium chloride 0.9%. 1000 milliLiter(s) (100 mL/Hr) IV Continuous <Continuous>    MEDICATIONS  (PRN):  acetaminophen   Tablet 650 milliGRAM(s) Oral every 6 hours PRN For Temp over 38.3 C (100.94 F)  aluminum hydroxide/magnesium hydroxide/simethicone Suspension 30 milliLiter(s) Oral four times a day PRN Indigestion  magnesium hydroxide Suspension 30 milliLiter(s) Oral daily PRN Constipation  morphine  - Injectable 2 milliGRAM(s) IV Push every 4 hours PRN Severe Pain/breakthrough pain  ondansetron Injectable 4 milliGRAM(s) IV Push every 6 hours PRN Nausea and/or Vomiting  oxyCODONE    IR 5 milliGRAM(s) Oral every 4 hours PRN Mild Pain  oxyCODONE    IR 10 milliGRAM(s) Oral every 4 hours PRN Moderate Pain  senna 2 Tablet(s) Oral at bedtime PRN Constipation      LABS:                          7.3    4.8   )-----------( 170      ( 21 Apr 2018 07:45 )             22.9     04-21    132<L>  |  97<L>  |  12.0  ----------------------------<  97  4.2   |  26.0  |  0.35<L>    Ca    8.2<L>      21 Apr 2018 07:45      RADIOLOGY & ADDITIONAL TESTS:    < from: Xray Knee 1 or 2 Views, Right (04.19.18 @ 12:18) >     EXAM:  KNEE-RIGHT                          PROCEDURE DATE:  04/19/2018          INTERPRETATION:  Right knee    Portable AP and lateral views.    Postoperative evaluation. Status post right knee replacement.    The prosthetic components are in goodposition. The alignment is normal.   Gas and fluid is present in the suprapatellar recess.    Impression: Right total knee replacement in good position        NILS MIRANDA M.D., ATTENDING RADIOLOGIST  This document has been electronically signed. Apr 19 2018 12:39PM    < end of copied text >        REVIEW OF SYSTEMS:    CONSTITUTIONAL: No fever, weight loss, or fatigue  EYES: No eye pain, visual disturbances, or discharge  ENMT:  No difficulty hearing, tinnitus, vertigo; No sinus or throat pain  NECK: No pain or stiffness  RESPIRATORY: No cough, wheezing, chills or hemoptysis; No shortness of breath  CARDIOVASCULAR: No chest pain, palpitations, dizziness, or leg swelling  GASTROINTESTINAL: No abdominal or epigastric pain. No nausea, vomiting, or hematemesis; No diarrhea or constipation. No melena or hematochezia.  GENITOURINARY: No dysuria, frequency, hematuria, or incontinence  NEUROLOGICAL: No headaches, memory loss, loss of strength, numbness, or tremors  SKIN: No itching, burning, rashes, or lesions   LYMPH NODES: No enlarged glands  ENDOCRINE: No heat or cold intolerance; No hair loss  MUSCULOSKELETAL: R knee pain well controlled   PSYCHIATRIC: No depression, anxiety, mood swings, or difficulty sleeping  HEME/LYMPH: No easy bruising, or bleeding gums  ALLERGY AND IMMUNOLOGIC: No hives or eczema    Vital Signs Last 24 Hrs  T(C): 37.4 (21 Apr 2018 09:55), Max: 37.4 (21 Apr 2018 09:55)  T(F): 99.4 (21 Apr 2018 09:55), Max: 99.4 (21 Apr 2018 09:55)  HR: 89 (21 Apr 2018 09:55) (78 - 98)  BP: 127/78 (21 Apr 2018 09:55) (116/67 - 154/81)  BP(mean): --  RR: 18 (21 Apr 2018 09:55) (18 - 18)  SpO2: 95% (21 Apr 2018 09:55) (95% - 96%)  PHYSICAL EXAM:    GENERAL: NAD, well-groomed, well-developed  HEAD:  Atraumatic, Normocephalic  EYES: EOMI, PERRLA, conjunctiva and sclera clear  NECK: Supple, No JVD, Normal thyroid  NERVOUS SYSTEM:  Alert & Oriented X3, no focal deficit  CHEST/LUNG: CTA b/l ,  no  rales, rhonchi, wheezing, or rubs  HEART: Regular rate and rhythm; No murmurs, rubs, or gallops  ABDOMEN: Soft, Nontender, Nondistended; Bowel sounds present  EXTREMITIES:  2+ Peripheral Pulses, No clubbing, cyanosis, or edema , R knee dressing + , clean dry and dry .  LYMPH: No lymphadenopathy noted  SKIN: No rashes or lesions

## 2018-04-21 NOTE — PROGRESS NOTE ADULT - SUBJECTIVE AND OBJECTIVE BOX
Patient seen and examined at bedside. comfortable in bed, pain controlled. Denies fever/chills, SOB/chest pain, abdominal pain, numbness/tingling. no complaints. States did stairs yesterday with PT, would like to go home today.    Vital Signs Last 24 Hrs  T(C): 37.4 (21 Apr 2018 09:55), Max: 37.4 (21 Apr 2018 09:55)  T(F): 99.4 (21 Apr 2018 09:55), Max: 99.4 (21 Apr 2018 09:55)  HR: 89 (21 Apr 2018 09:55) (78 - 98)  BP: 127/78 (21 Apr 2018 09:55) (116/67 - 154/81)  BP(mean): --  RR: 18 (21 Apr 2018 09:55) (18 - 18)  SpO2: 95% (21 Apr 2018 09:55) (95% - 96%)    RLE: Ace bandage dressing C/D/I. Removed. Prineo in tact, no erythema no active drainage. + dorsi/plantarflexion. DP 2+. CAlf soft NT B/L.                          7.3    4.8   )-----------( 170      ( 21 Apr 2018 07:45 )             22.9     04-21    132<L>  |  97<L>  |  12.0  ----------------------------<  97  4.2   |  26.0  |  0.35<L>    Ca    8.2<L>      21 Apr 2018 07:45    A/P: 77 y.o F s/p right TKA POD #2  - H/H 7.3/22.9, D/W Dr. Aceves and Dr. Chambers, 1 unit prbc ordered will repeat later today  - DVTP  -WBAT  - d/c planning - possibly home today if cleared by PT/medicine Patient seen and examined at bedside. comfortable in bed, pain controlled. Denies fever/chills, SOB/chest pain, abdominal pain, numbness/tingling. no complaints. States did stairs yesterday with PT, would like to go home today.    Vital Signs Last 24 Hrs  T(C): 37.4 (21 Apr 2018 09:55), Max: 37.4 (21 Apr 2018 09:55)  T(F): 99.4 (21 Apr 2018 09:55), Max: 99.4 (21 Apr 2018 09:55)  HR: 89 (21 Apr 2018 09:55) (78 - 98)  BP: 127/78 (21 Apr 2018 09:55) (116/67 - 154/81)  BP(mean): --  RR: 18 (21 Apr 2018 09:55) (18 - 18)  SpO2: 95% (21 Apr 2018 09:55) (95% - 96%)    RLE: Ace bandage dressing C/D/I. Removed. Prineo in tact, no erythema no active drainage. + dorsi/plantarflexion. DP 2+. CAlf soft NT B/L.                          7.3    4.8   )-----------( 170      ( 21 Apr 2018 07:45 )             22.9     04-21    132<L>  |  97<L>  |  12.0  ----------------------------<  97  4.2   |  26.0  |  0.35<L>    Ca    8.2<L>      21 Apr 2018 07:45    A/P: 77 y.o F s/p right TKA POD #2  - H/H 7.3/22.9, D/W Dr. Aceves and Dr. Chambers, 1 unit prbc ordered will repeat later today  - DVTP  -WBAT  - dressing changed  - d/c planning - possibly home today if cleared by PT/medicine

## 2018-04-21 NOTE — PROGRESS NOTE ADULT - ASSESSMENT
77 year old female with a history of depression, GERD, and seizures (last seizure 2013), Migraines presents with osteoarthritis of the right knee . She is s/p right Total knee replacement POD# 2 .     R knee primary OA  - s/p rt TKR    R  knee hip replacement - Pain control   Bowel regimen   Incentive spirometry  DVT px - per ortho   PT    History of Seizures - Ct Keppra at home dose    Migraines - well controlled   sumatriptan PRN     Anemia - Acute blood loss anemia - post surgical on possibly chronic anemia - ? cause  monitor   Labs in am,     Mild Hyponatremia - likely chronic - stable     Anemia - likely ABLA as a cause of postoperative anemia - patient tachycardic - will transfuse 1 unit PRBC - 77 year old female with a history of depression, GERD, and seizures (last seizure 2013), Migraines presents with osteoarthritis of the right knee . She is s/p right Total knee replacement POD# 2 .     R knee primary OA  - s/p rt TKR    R  knee hip replacement - Pain control   Bowel regimen   Incentive spirometry  DVT px - per ortho   PT    History of Seizures - Ct Keppra at home dose    Migraines - well controlled   sumatriptan PRN       Mild Hyponatremia - likely chronic - stable  , patient informed , advised to follow up with PMD in 2 wks     Anemia - likely ABLA as a cause of postoperative anemia - patient tachycardic - will transfuse 1 unit PRBC .

## 2018-04-26 LAB — SURGICAL PATHOLOGY FINAL REPORT - CH: SIGNIFICANT CHANGE UP

## 2018-04-27 ENCOUNTER — APPOINTMENT (OUTPATIENT)
Dept: ORTHOPEDIC SURGERY | Facility: CLINIC | Age: 78
End: 2018-04-27

## 2018-05-01 ENCOUNTER — OTHER (OUTPATIENT)
Age: 78
End: 2018-05-01

## 2018-05-11 ENCOUNTER — APPOINTMENT (OUTPATIENT)
Dept: ORTHOPEDIC SURGERY | Facility: CLINIC | Age: 78
End: 2018-05-11
Payer: MEDICARE

## 2018-05-11 VITALS
BODY MASS INDEX: 25.61 KG/M2 | WEIGHT: 150 LBS | DIASTOLIC BLOOD PRESSURE: 91 MMHG | SYSTOLIC BLOOD PRESSURE: 141 MMHG | HEART RATE: 103 BPM | HEIGHT: 64 IN

## 2018-05-11 PROCEDURE — 99024 POSTOP FOLLOW-UP VISIT: CPT

## 2018-05-11 PROCEDURE — 73562 X-RAY EXAM OF KNEE 3: CPT | Mod: RT

## 2018-05-20 ENCOUNTER — RX RENEWAL (OUTPATIENT)
Age: 78
End: 2018-05-20

## 2018-06-12 ENCOUNTER — APPOINTMENT (OUTPATIENT)
Dept: ORTHOPEDIC SURGERY | Facility: CLINIC | Age: 78
End: 2018-06-12

## 2018-06-14 ENCOUNTER — RESULT CHARGE (OUTPATIENT)
Age: 78
End: 2018-06-14

## 2018-06-14 ENCOUNTER — APPOINTMENT (OUTPATIENT)
Dept: RHEUMATOLOGY | Facility: CLINIC | Age: 78
End: 2018-06-14
Payer: MEDICARE

## 2018-06-14 VITALS
BODY MASS INDEX: 23.9 KG/M2 | WEIGHT: 140 LBS | SYSTOLIC BLOOD PRESSURE: 120 MMHG | HEIGHT: 64 IN | HEART RATE: 96 BPM | OXYGEN SATURATION: 98 % | RESPIRATION RATE: 17 BRPM | TEMPERATURE: 97.6 F | DIASTOLIC BLOOD PRESSURE: 66 MMHG

## 2018-06-14 PROCEDURE — 99214 OFFICE O/P EST MOD 30 MIN: CPT | Mod: 25

## 2018-06-14 PROCEDURE — 36415 COLL VENOUS BLD VENIPUNCTURE: CPT

## 2018-06-14 PROCEDURE — 85651 RBC SED RATE NONAUTOMATED: CPT

## 2018-06-15 LAB
APPEARANCE: CLEAR
BACTERIA: NEGATIVE
BILIRUBIN URINE: NEGATIVE
BLOOD URINE: NEGATIVE
COLOR: ABNORMAL
GLUCOSE QUALITATIVE U: NEGATIVE MG/DL
HYALINE CASTS: 6 /LPF
KETONES URINE: NEGATIVE
LEUKOCYTE ESTERASE URINE: NEGATIVE
MICROSCOPIC-UA: NORMAL
NITRITE URINE: NEGATIVE
PH URINE: 5.5
PROTEIN URINE: NEGATIVE MG/DL
RED BLOOD CELLS URINE: 6 /HPF
SPECIFIC GRAVITY URINE: 1.02
SQUAMOUS EPITHELIAL CELLS: 1 /HPF
TSH SERPL-ACNC: 1.95 UIU/ML
UROBILINOGEN URINE: NEGATIVE MG/DL
WHITE BLOOD CELLS URINE: 1 /HPF

## 2018-06-16 LAB
DEPRECATED KAPPA LC FREE/LAMBDA SER: 1.58 RATIO
IGA SER QL IEP: 124 MG/DL
IGG SER QL IEP: 1001 MG/DL
IGM SER QL IEP: 93 MG/DL
KAPPA LC CSF-MCNC: 1.67 MG/DL
KAPPA LC SERPL-MCNC: 2.64 MG/DL

## 2018-06-19 ENCOUNTER — APPOINTMENT (OUTPATIENT)
Dept: ORTHOPEDIC SURGERY | Facility: CLINIC | Age: 78
End: 2018-06-19
Payer: MEDICARE

## 2018-06-19 VITALS
HEIGHT: 64 IN | WEIGHT: 140 LBS | BODY MASS INDEX: 23.9 KG/M2 | SYSTOLIC BLOOD PRESSURE: 150 MMHG | HEART RATE: 89 BPM | DIASTOLIC BLOOD PRESSURE: 88 MMHG

## 2018-06-19 LAB — M PROTEIN SPEC IFE-MCNC: NORMAL

## 2018-06-19 PROCEDURE — 73562 X-RAY EXAM OF KNEE 3: CPT | Mod: RT

## 2018-06-19 PROCEDURE — 99024 POSTOP FOLLOW-UP VISIT: CPT

## 2018-07-03 NOTE — ASU PREOP CHECKLIST - HAND OFF
Nose:  Start with saline - hold breath and blow   Next azelastine 1 spray per nare twice per day.   May use up to 4 times per day - this is for congestion and post nasal drip.   nasacort- 2 spray per nare twice per day for inflammation.     Nose spray  Head down  Aim up and out   Spray do not drip    Lung:  symbicort 80 -sample given today   1 puff twice per day - brush teeth after use.   -due to possible breo making you lose your voice. We'll do a trial off the medication of breo. Do not take breo and symbicort at the same time.     Follow up in 4 weeks       We can pursue skin prick testing if the above doesn't help.    yes

## 2018-07-11 ENCOUNTER — APPOINTMENT (OUTPATIENT)
Dept: ORTHOPEDIC SURGERY | Facility: CLINIC | Age: 78
End: 2018-07-11
Payer: MEDICARE

## 2018-07-11 VITALS
SYSTOLIC BLOOD PRESSURE: 133 MMHG | BODY MASS INDEX: 23.9 KG/M2 | WEIGHT: 140 LBS | DIASTOLIC BLOOD PRESSURE: 84 MMHG | HEART RATE: 85 BPM | HEIGHT: 64 IN

## 2018-07-11 PROCEDURE — 99024 POSTOP FOLLOW-UP VISIT: CPT

## 2018-07-11 PROCEDURE — 73562 X-RAY EXAM OF KNEE 3: CPT | Mod: RT

## 2018-07-16 PROBLEM — R56.9 UNSPECIFIED CONVULSIONS: Chronic | Status: ACTIVE | Noted: 2018-01-10

## 2018-08-04 ENCOUNTER — RX RENEWAL (OUTPATIENT)
Age: 78
End: 2018-08-04

## 2018-08-09 ENCOUNTER — APPOINTMENT (OUTPATIENT)
Dept: RHEUMATOLOGY | Facility: CLINIC | Age: 78
End: 2018-08-09
Payer: MEDICARE

## 2018-08-09 PROCEDURE — 96401 CHEMO ANTI-NEOPL SQ/IM: CPT

## 2018-08-10 NOTE — H&P PST ADULT - HISTORY OF PRESENT ILLNESS
77 year old female with a history of depression, GERD, and seizures (last seizure 2013) presents with osteoarthritis of the right knee scheduled for a right total knee replacement on 4/19/18. Yes

## 2018-10-03 ENCOUNTER — LABORATORY RESULT (OUTPATIENT)
Age: 78
End: 2018-10-03

## 2018-10-03 ENCOUNTER — APPOINTMENT (OUTPATIENT)
Dept: RHEUMATOLOGY | Facility: CLINIC | Age: 78
End: 2018-10-03
Payer: MEDICARE

## 2018-10-03 DIAGNOSIS — M25.561 PAIN IN RIGHT KNEE: ICD-10-CM

## 2018-10-03 DIAGNOSIS — M25.562 PAIN IN RIGHT KNEE: ICD-10-CM

## 2018-10-03 PROBLEM — F32.9 MAJOR DEPRESSIVE DISORDER, SINGLE EPISODE, UNSPECIFIED: Chronic | Status: ACTIVE | Noted: 2018-01-10

## 2018-10-03 PROBLEM — R25.1 TREMOR, UNSPECIFIED: Chronic | Status: ACTIVE | Noted: 2018-01-10

## 2018-10-03 PROBLEM — N63.10 UNSPECIFIED LUMP IN THE RIGHT BREAST, UNSPECIFIED QUADRANT: Chronic | Status: ACTIVE | Noted: 2018-01-10

## 2018-10-03 PROBLEM — G43.909 MIGRAINE, UNSPECIFIED, NOT INTRACTABLE, WITHOUT STATUS MIGRAINOSUS: Chronic | Status: ACTIVE | Noted: 2018-01-10

## 2018-10-03 PROBLEM — M19.90 UNSPECIFIED OSTEOARTHRITIS, UNSPECIFIED SITE: Chronic | Status: ACTIVE | Noted: 2018-01-10

## 2018-10-03 PROBLEM — H26.9 UNSPECIFIED CATARACT: Chronic | Status: ACTIVE | Noted: 2018-01-10

## 2018-10-03 PROBLEM — M71.20 SYNOVIAL CYST OF POPLITEAL SPACE [BAKER], UNSPECIFIED KNEE: Chronic | Status: ACTIVE | Noted: 2018-01-10

## 2018-10-03 PROBLEM — M81.0 AGE-RELATED OSTEOPOROSIS WITHOUT CURRENT PATHOLOGICAL FRACTURE: Chronic | Status: ACTIVE | Noted: 2018-01-10

## 2018-10-03 PROBLEM — K21.9 GASTRO-ESOPHAGEAL REFLUX DISEASE WITHOUT ESOPHAGITIS: Chronic | Status: ACTIVE | Noted: 2018-01-10

## 2018-10-03 PROCEDURE — 99215 OFFICE O/P EST HI 40 MIN: CPT

## 2018-10-03 RX ORDER — ASPIRIN 325 MG
325 TABLET ORAL
Refills: 0 | Status: DISCONTINUED | COMMUNITY
End: 2018-10-03

## 2018-10-03 RX ORDER — SENNOSIDES/DOCUSATE SODIUM 8.6MG-50MG
8.6-5 TABLET ORAL
Qty: 28 | Refills: 0 | Status: DISCONTINUED | COMMUNITY
Start: 2018-04-20

## 2018-10-03 RX ORDER — LOSARTAN POTASSIUM 50 MG/1
50 TABLET, FILM COATED ORAL
Qty: 30 | Refills: 0 | Status: DISCONTINUED | COMMUNITY
Start: 2018-05-14

## 2018-10-03 RX ORDER — ASPIRIN 81 MG
81 TABLET, DELAYED RELEASE (ENTERIC COATED) ORAL DAILY
Refills: 0 | Status: ACTIVE | COMMUNITY

## 2018-10-03 RX ORDER — OMEPRAZOLE 40 MG/1
40 CAPSULE, DELAYED RELEASE ORAL
Refills: 0 | Status: DISCONTINUED | COMMUNITY
End: 2018-10-03

## 2018-10-03 RX ORDER — ASPIRIN 325 MG/1
325 TABLET ORAL
Qty: 84 | Refills: 0 | Status: DISCONTINUED | COMMUNITY
Start: 2018-04-20

## 2018-10-03 RX ORDER — BACLOFEN 10 MG/1
10 TABLET ORAL
Qty: 180 | Refills: 0 | Status: DISCONTINUED | COMMUNITY
Start: 2018-03-09 | End: 2018-10-03

## 2018-10-06 LAB
25(OH)D3 SERPL-MCNC: 63.6 NG/ML
ALBUMIN SERPL ELPH-MCNC: 4.7 G/DL
ALP BLD-CCNC: 88 U/L
ALT SERPL-CCNC: 17 U/L
ANION GAP SERPL CALC-SCNC: 13 MMOL/L
APPEARANCE: CLEAR
AST SERPL-CCNC: 20 U/L
BASOPHILS # BLD AUTO: 0.03 K/UL
BASOPHILS NFR BLD AUTO: 0.6 %
BILIRUB SERPL-MCNC: 0.2 MG/DL
BILIRUBIN URINE: NEGATIVE
BLOOD URINE: NEGATIVE
BUN SERPL-MCNC: 10 MG/DL
CALCIUM SERPL-MCNC: 9.7 MG/DL
CHLORIDE SERPL-SCNC: 93 MMOL/L
CO2 SERPL-SCNC: 25 MMOL/L
COLOR: YELLOW
CREAT SERPL-MCNC: 0.56 MG/DL
CRP SERPL-MCNC: 0.24 MG/DL
EOSINOPHIL # BLD AUTO: 0.16 K/UL
EOSINOPHIL NFR BLD AUTO: 3.2 %
ERYTHROCYTE [SEDIMENTATION RATE] IN BLOOD BY WESTERGREN METHOD: 6 MM/HR
GLUCOSE QUALITATIVE U: NEGATIVE MG/DL
GLUCOSE SERPL-MCNC: 71 MG/DL
HCT VFR BLD CALC: 39.8 %
HGB BLD-MCNC: 13.3 G/DL
IMM GRANULOCYTES NFR BLD AUTO: 0.2 %
KETONES URINE: NEGATIVE
LEUKOCYTE ESTERASE URINE: ABNORMAL
LYMPHOCYTES # BLD AUTO: 1.26 K/UL
LYMPHOCYTES NFR BLD AUTO: 25.1 %
MAN DIFF?: NORMAL
MCHC RBC-ENTMCNC: 31.3 PG
MCHC RBC-ENTMCNC: 33.4 GM/DL
MCV RBC AUTO: 93.6 FL
MONOCYTES # BLD AUTO: 0.52 K/UL
MONOCYTES NFR BLD AUTO: 10.4 %
NEUTROPHILS # BLD AUTO: 3.03 K/UL
NEUTROPHILS NFR BLD AUTO: 60.5 %
NITRITE URINE: NEGATIVE
PH URINE: 7
PHOSPHATE SERPL-MCNC: 4 MG/DL
PLATELET # BLD AUTO: 358 K/UL
POTASSIUM SERPL-SCNC: 5.6 MMOL/L
PROT SERPL-MCNC: 7 G/DL
PROTEIN URINE: NEGATIVE MG/DL
RBC # BLD: 4.25 M/UL
RBC # FLD: 13.3 %
SODIUM SERPL-SCNC: 131 MMOL/L
SPECIFIC GRAVITY URINE: 1.01
UROBILINOGEN URINE: NEGATIVE MG/DL
WBC # FLD AUTO: 5.01 K/UL

## 2018-11-16 ENCOUNTER — RX RENEWAL (OUTPATIENT)
Age: 78
End: 2018-11-16

## 2019-01-17 ENCOUNTER — APPOINTMENT (OUTPATIENT)
Dept: RHEUMATOLOGY | Facility: CLINIC | Age: 79
End: 2019-01-17
Payer: MEDICARE

## 2019-01-17 VITALS
BODY MASS INDEX: 23.66 KG/M2 | DIASTOLIC BLOOD PRESSURE: 80 MMHG | SYSTOLIC BLOOD PRESSURE: 132 MMHG | OXYGEN SATURATION: 98 % | RESPIRATION RATE: 17 BRPM | HEART RATE: 62 BPM | TEMPERATURE: 97.8 F | WEIGHT: 142 LBS | HEIGHT: 65 IN

## 2019-01-17 DIAGNOSIS — M79.645 PAIN IN LEFT FINGER(S): ICD-10-CM

## 2019-01-17 PROCEDURE — 36415 COLL VENOUS BLD VENIPUNCTURE: CPT

## 2019-01-17 PROCEDURE — 99214 OFFICE O/P EST MOD 30 MIN: CPT | Mod: 25

## 2019-01-17 RX ORDER — IBUPROFEN 200 MG
600 CAPSULE ORAL
Qty: 60 | Refills: 5 | Status: DISCONTINUED | COMMUNITY
Start: 2017-07-03 | End: 2019-01-17

## 2019-01-18 LAB
ALBUMIN SERPL ELPH-MCNC: 4.2 G/DL
ALP BLD-CCNC: 65 U/L
ALT SERPL-CCNC: 14 U/L
ANION GAP SERPL CALC-SCNC: 10 MMOL/L
ANION GAP SERPL CALC-SCNC: 8 MMOL/L
APPEARANCE: CLEAR
AST SERPL-CCNC: 21 U/L
BACTERIA: NEGATIVE
BASOPHILS # BLD AUTO: 0.02 K/UL
BASOPHILS NFR BLD AUTO: 0.3 %
BILIRUB SERPL-MCNC: 0.3 MG/DL
BILIRUBIN URINE: NEGATIVE
BLOOD URINE: NEGATIVE
BUN SERPL-MCNC: 14 MG/DL
BUN SERPL-MCNC: 14 MG/DL
CALCIUM SERPL-MCNC: 9.2 MG/DL
CALCIUM SERPL-MCNC: 9.4 MG/DL
CHLORIDE SERPL-SCNC: 96 MMOL/L
CHLORIDE SERPL-SCNC: 98 MMOL/L
CO2 SERPL-SCNC: 26 MMOL/L
CO2 SERPL-SCNC: 27 MMOL/L
COLOR: ABNORMAL
CREAT SERPL-MCNC: 0.52 MG/DL
CREAT SERPL-MCNC: 0.64 MG/DL
CRP SERPL-MCNC: 0.13 MG/DL
ENA SS-A AB SER IA-ACNC: <0.2 AL
ENA SS-B AB SER IA-ACNC: <0.2 AL
EOSINOPHIL # BLD AUTO: 0.28 K/UL
EOSINOPHIL NFR BLD AUTO: 4.5 %
ERYTHROCYTE [SEDIMENTATION RATE] IN BLOOD BY WESTERGREN METHOD: 2 MM/HR
GLUCOSE QUALITATIVE U: NEGATIVE MG/DL
GLUCOSE SERPL-MCNC: 70 MG/DL
GLUCOSE SERPL-MCNC: 70 MG/DL
HCT VFR BLD CALC: 37.4 %
HGB BLD-MCNC: 12.2 G/DL
HYALINE CASTS: 0 /LPF
IMM GRANULOCYTES NFR BLD AUTO: 0 %
KETONES URINE: NEGATIVE
LEUKOCYTE ESTERASE URINE: NEGATIVE
LYMPHOCYTES # BLD AUTO: 1.25 K/UL
LYMPHOCYTES NFR BLD AUTO: 20.2 %
MAN DIFF?: NORMAL
MCHC RBC-ENTMCNC: 31 PG
MCHC RBC-ENTMCNC: 32.6 GM/DL
MCV RBC AUTO: 94.9 FL
MICROSCOPIC-UA: NORMAL
MONOCYTES # BLD AUTO: 0.66 K/UL
MONOCYTES NFR BLD AUTO: 10.7 %
NEUTROPHILS # BLD AUTO: 3.97 K/UL
NEUTROPHILS NFR BLD AUTO: 64.3 %
NITRITE URINE: NEGATIVE
PH URINE: 7
PHOSPHATE SERPL-MCNC: 4 MG/DL
PLATELET # BLD AUTO: 333 K/UL
POTASSIUM SERPL-SCNC: 5.8 MMOL/L
POTASSIUM SERPL-SCNC: 5.9 MMOL/L
PROT SERPL-MCNC: 6.3 G/DL
PROTEIN URINE: NEGATIVE MG/DL
RBC # BLD: 3.94 M/UL
RBC # FLD: 13.3 %
RED BLOOD CELLS URINE: 1 /HPF
SODIUM SERPL-SCNC: 131 MMOL/L
SODIUM SERPL-SCNC: 134 MMOL/L
SPECIFIC GRAVITY URINE: 1.01
SQUAMOUS EPITHELIAL CELLS: 1 /HPF
UROBILINOGEN URINE: NEGATIVE MG/DL
WBC # FLD AUTO: 6.18 K/UL
WHITE BLOOD CELLS URINE: 0 /HPF

## 2019-02-26 ENCOUNTER — RX RENEWAL (OUTPATIENT)
Age: 79
End: 2019-02-26

## 2019-03-06 ENCOUNTER — CLINICAL ADVICE (OUTPATIENT)
Age: 79
End: 2019-03-06

## 2019-03-16 ENCOUNTER — RX RENEWAL (OUTPATIENT)
Age: 79
End: 2019-03-16

## 2019-03-24 ENCOUNTER — RX RENEWAL (OUTPATIENT)
Age: 79
End: 2019-03-24

## 2019-04-06 ENCOUNTER — TRANSCRIPTION ENCOUNTER (OUTPATIENT)
Age: 79
End: 2019-04-06

## 2019-04-29 ENCOUNTER — APPOINTMENT (OUTPATIENT)
Dept: RHEUMATOLOGY | Facility: CLINIC | Age: 79
End: 2019-04-29
Payer: MEDICARE

## 2019-04-29 VITALS
SYSTOLIC BLOOD PRESSURE: 128 MMHG | HEIGHT: 65 IN | OXYGEN SATURATION: 96 % | WEIGHT: 150 LBS | DIASTOLIC BLOOD PRESSURE: 68 MMHG | BODY MASS INDEX: 24.99 KG/M2 | RESPIRATION RATE: 17 BRPM | HEART RATE: 83 BPM | TEMPERATURE: 98.3 F

## 2019-04-29 DIAGNOSIS — M25.542 PAIN IN JOINTS OF LEFT HAND: ICD-10-CM

## 2019-04-29 PROCEDURE — 99215 OFFICE O/P EST HI 40 MIN: CPT

## 2019-04-29 RX ORDER — CALCIUM CARB/VIT D3/MINERALS 1200-1000
1200-1000 TABLET,CHEWABLE ORAL
Refills: 0 | Status: DISCONTINUED | COMMUNITY
End: 2019-04-29

## 2019-05-04 LAB
ALBUMIN SERPL ELPH-MCNC: 4.4 G/DL
ALP BLD-CCNC: 86 U/L
ALT SERPL-CCNC: 14 U/L
ANION GAP SERPL CALC-SCNC: 11 MMOL/L
AST SERPL-CCNC: 20 U/L
BASOPHILS # BLD AUTO: 0.03 K/UL
BASOPHILS NFR BLD AUTO: 0.5 %
BILIRUB SERPL-MCNC: 0.3 MG/DL
BUN SERPL-MCNC: 15 MG/DL
CALCIUM SERPL-MCNC: 10.1 MG/DL
CHLORIDE SERPL-SCNC: 99 MMOL/L
CK SERPL-CCNC: 106 U/L
CO2 SERPL-SCNC: 27 MMOL/L
CREAT SERPL-MCNC: 0.58 MG/DL
CRP SERPL-MCNC: <0.1 MG/DL
DEPRECATED KAPPA LC FREE/LAMBDA SER: 1.59 RATIO
ENA SS-A AB SER IA-ACNC: <0.2 AL
ENA SS-B AB SER IA-ACNC: <0.2 AL
EOSINOPHIL # BLD AUTO: 0.29 K/UL
EOSINOPHIL NFR BLD AUTO: 5.1 %
ERYTHROCYTE [SEDIMENTATION RATE] IN BLOOD BY WESTERGREN METHOD: 4 MM/HR
GLUCOSE SERPL-MCNC: 101 MG/DL
HCT VFR BLD CALC: 40.6 %
HGB BLD-MCNC: 12.8 G/DL
IGA SER QL IEP: 105 MG/DL
IGG SER QL IEP: 840 MG/DL
IGM SER QL IEP: 83 MG/DL
IMM GRANULOCYTES NFR BLD AUTO: 0.2 %
KAPPA LC CSF-MCNC: 1.43 MG/DL
KAPPA LC SERPL-MCNC: 2.27 MG/DL
LYMPHOCYTES # BLD AUTO: 1.26 K/UL
LYMPHOCYTES NFR BLD AUTO: 22.3 %
M PROTEIN SPEC IFE-MCNC: NORMAL
MAN DIFF?: NORMAL
MCHC RBC-ENTMCNC: 30.6 PG
MCHC RBC-ENTMCNC: 31.5 GM/DL
MCV RBC AUTO: 97.1 FL
MONOCYTES # BLD AUTO: 0.63 K/UL
MONOCYTES NFR BLD AUTO: 11.1 %
NEUTROPHILS # BLD AUTO: 3.44 K/UL
NEUTROPHILS NFR BLD AUTO: 60.8 %
PHOSPHATE SERPL-MCNC: 4.8 MG/DL
PLATELET # BLD AUTO: 300 K/UL
POTASSIUM SERPL-SCNC: 4.8 MMOL/L
PROT SERPL-MCNC: 6.6 G/DL
RBC # BLD: 4.18 M/UL
RBC # FLD: 13 %
SODIUM SERPL-SCNC: 137 MMOL/L
TSH SERPL-ACNC: 1.78 UIU/ML
WBC # FLD AUTO: 5.66 K/UL

## 2019-06-15 ENCOUNTER — RX RENEWAL (OUTPATIENT)
Age: 79
End: 2019-06-15

## 2019-07-03 ENCOUNTER — APPOINTMENT (OUTPATIENT)
Dept: RHEUMATOLOGY | Facility: CLINIC | Age: 79
End: 2019-07-03
Payer: MEDICARE

## 2019-07-03 PROCEDURE — 99214 OFFICE O/P EST MOD 30 MIN: CPT

## 2019-07-10 ENCOUNTER — APPOINTMENT (OUTPATIENT)
Dept: ORTHOPEDIC SURGERY | Facility: CLINIC | Age: 79
End: 2019-07-10
Payer: MEDICARE

## 2019-07-10 VITALS
BODY MASS INDEX: 26.58 KG/M2 | DIASTOLIC BLOOD PRESSURE: 83 MMHG | SYSTOLIC BLOOD PRESSURE: 132 MMHG | HEART RATE: 83 BPM | WEIGHT: 150 LBS | HEIGHT: 63 IN

## 2019-07-10 PROCEDURE — 73562 X-RAY EXAM OF KNEE 3: CPT | Mod: 50

## 2019-07-10 PROCEDURE — 99214 OFFICE O/P EST MOD 30 MIN: CPT

## 2019-07-10 NOTE — ADDENDUM
[FreeTextEntry1] : I, Akbar De León, acted solely as a scribe for Dr. Rayshawn Chambers on this date 07/10/2019.

## 2019-07-10 NOTE — REVIEW OF SYSTEMS
[Negative] : Heme/Lymph [Joint Stiffness] : no joint stiffness [Joint Pain] : no joint pain [Joint Swelling] : no joint swelling [FreeTextEntry9] : b/l knee

## 2019-07-10 NOTE — DISCUSSION/SUMMARY
[de-identified] : 79 year old  female S/P Right TKA DOS 4/19/2018, S/P Left TKA DOS 2/1/2018. She is doing very well following bilateral TKA surgeries. Xrays were reviewed and the patient was reassured that their bilateral TKA components are in good position with no signs of loosening or wear. I discussed the importance of antibiotic use with any dental work. F/U annually for radiographic surveillance of TKA components. \par \par A knee replacement means resurfacing of all 3 surfaces of the patella, the femur, and tibia with metal and plastic parts. The prosthetic parts are usually cemented into position and well outpatient range of motion from full extension to about 120° of flexion. The postoperative motion, however, is determined by multiple factors, the most important of which is preoperative motion. In general, the better motion preoperatively, the better the motion postoperatively. The operation, pending medical clearance, gently requires hospitalization of 3-4 days for one knee, 5-7 days for bilateral knee replacements. In general, we prefer to perform the procedure under spinal anesthesia with femoral nerve block and occasional single shot sciatic nerve block. We may ask a patient to give 2 units of blood for bilateral total knee arthroplasties, for one knee we institute a normogram which may include administration of preoperative Procrit. The operative procedure takes probably 1-2 hours. The operation requires a straight incision anywhere from 5-7 inches down from the knee. Postoperatively, the patient is positioned in a continuous passive motion machine within the first 24 hours and is walking the day after surgery. The first couple days are very painful and the pain medication will alleviate, but not eliminate the pain. The patient must really push hard to get range of motion. Our goal for having a person go home as that the range of motion is approximately 0-90° of flexion and that they can walk with a walker or cane. A walking aid is to be dispensed once the patient is secure enough. In general there, there is no cast or brace required with routine knee replacement. In the long term, we do not encourage our patients to run for the sake of running, although pending their preoperative status, we often allow patient to play doubles tennis or comparative activities. We also allowed them to do gentle intermediate downhill skiing if they are truly an expert skier. Biking is encouraged as well swimming. The followup periods are usually 3 weeks, 6 weeks, 3 months, and yearly intervals. Potential complications with total knee replacement included anesthetic complications and death, infection around 1%, nerve damage, by which means peroneal nerve palsy, footdrop or flapping foot with ambulation. This is particularly more apt to occur in the patient with a valgus or knock-knee deformity. The incidence can be quite high in this particular patient population. There will be areas of skin numbness, but this is not an untoward effect nor do we consider it a complication. Other potential complications include dislocation of the patella component, usually less than 2%; loosening of the tibial or femoral component is much more infrequent. Most often this occurs with infection or long-term use. Patient of extreme risk including markedly overweight patient's may be more prone to prosthetic wear. Major blood vessel damage is also extremely rare. Directly because of the anatomic proximity of the popliteal artery this could be lacerated with subsequent repair required. Be it unlikely, disruption of the popliteal artery could theoretically result in amputation. Similarly, infection could theoretically result in amputation if one were to grow out of an organism that cannot be controlled with antibiotics. General medical complications include phlebitis, for which we would prophylactically anticoagulate patients, but could still occur, and fatal pulmonary embolus which has been reported. Cardiovascular problems, such as heart attack or ischemia are always a concern with such hemodynamic changes in the blood vascular system. Other General complications are rare, but anything medicine could theoretically happen. I think the patient understands the risk benefit ratio of total knee replacement and will think about whether there like to pursue with an operation or nonoperative treatment program. I reviewed the plan of care as well as a model of a total knee implant equivalent to the one that will be used for their total knee joint replacement. The patient agreed to the plan of care as well as the use of implants for their knee total joint replacement.

## 2019-07-10 NOTE — PHYSICAL EXAM
[Normal] : Gait: normal [LE] : Sensory: Intact in bilateral lower extremities [ALL] : dorsalis pedis, posterior tibial, femoral, popliteal, and radial 2+ and symmetric bilaterally [Antalgic] : not antalgic [de-identified] : GENERAL APPEARANCE: Well nourished and hydrated, pleasant, alert, and oriented x 3. Appears their stated age. \par HEENT: Normocephalic, extraocular eye motion intact. Nasal septum midline. Oral cavity clear. External auditory canal clear. \par RESPIRATORY: Breath sounds clear and audible in all lobes. No wheezing, No accessory muscle use.\par CARDIOVASCULAR: No apparent abnormalities. No lower leg edema. No varicosities. Pedal pulses are palpable.\par NEUROLOGIC: Sensation is normal, no muscle weakness in the upper or lower extremities.\par DERMATOLOGIC: No apparent skin lesions, moist, warm, no rash.\par SPINE: Cervical spine appears normal and moves freely; thoracic spine appears normal and moves freely; lumbosacral spine appears normal and moves freely, normal, nontender.\par MUSCULOSKELETAL: Hands, wrists, and elbows are normal and move freely, shoulders are normal and move freely.  [de-identified] : Right knee midline incision is well-healed with no signs of infection. Full, painless range of motion, 0 degrees of extension to 120 degrees of flexion.\par Left knee midline incision is well-healed with no signs of infection. Full, painless range of motion, 0 degrees of extension to 120 degrees of flexion.  [de-identified] : 5V xray of the bilateral knees done in office today and reviewed by Dr. Rayshawn Chambers demonstrates s/p bilateral TKA with implants in good positioning, no sign of wear, loosening or subsidence.

## 2019-07-10 NOTE — HISTORY OF PRESENT ILLNESS
[Pain Location] : pain [Stable] : stable [1] : an average pain level of 1/10 [0] : a minimum pain level of 0/10 [2] : a maximum pain level of 2/10 [Intermit.] : ~He/She~ states the symptoms seem to be intermittent [Rest] : relieved by rest [Recumbency] : relieved by recumbency [Walking] : worsened by walking [de-identified] : 79 year old female presents for evaluation S/P Right TKA DOS 4/19/2018, S/P Left TKA DOS 2/1/2018. Today she denies any pain in the left knee and she reports only mild pain in the right knee, which does not require intervention. She is walking well without using an ambulatory aid. She is able to carry out all ADLs without difficulty. Overall she is satisfied with her surgical outcome.

## 2019-07-28 ENCOUNTER — MEDICATION RENEWAL (OUTPATIENT)
Age: 79
End: 2019-07-28

## 2019-08-01 ENCOUNTER — RX RENEWAL (OUTPATIENT)
Age: 79
End: 2019-08-01

## 2019-08-11 ENCOUNTER — MEDICATION RENEWAL (OUTPATIENT)
Age: 79
End: 2019-08-11

## 2019-09-16 ENCOUNTER — RX RENEWAL (OUTPATIENT)
Age: 79
End: 2019-09-16

## 2019-09-25 RX ADMIN — DENOSUMAB 60 MG/ML: 60 INJECTION SUBCUTANEOUS at 00:00

## 2019-09-26 ENCOUNTER — APPOINTMENT (OUTPATIENT)
Dept: RHEUMATOLOGY | Facility: CLINIC | Age: 79
End: 2019-09-26
Payer: MEDICARE

## 2019-09-26 VITALS
RESPIRATION RATE: 19 BRPM | TEMPERATURE: 97.4 F | DIASTOLIC BLOOD PRESSURE: 79 MMHG | SYSTOLIC BLOOD PRESSURE: 148 MMHG | HEART RATE: 79 BPM | OXYGEN SATURATION: 96 %

## 2019-09-26 PROCEDURE — 96401 CHEMO ANTI-NEOPL SQ/IM: CPT

## 2019-09-26 RX ORDER — DENOSUMAB 60 MG/ML
60 INJECTION SUBCUTANEOUS
Qty: 1 | Refills: 0 | Status: COMPLETED | OUTPATIENT
Start: 2019-09-25

## 2019-10-02 ENCOUNTER — APPOINTMENT (OUTPATIENT)
Dept: RHEUMATOLOGY | Facility: CLINIC | Age: 79
End: 2019-10-02

## 2019-11-13 ENCOUNTER — MEDICATION RENEWAL (OUTPATIENT)
Age: 79
End: 2019-11-13

## 2019-11-15 ENCOUNTER — MEDICATION RENEWAL (OUTPATIENT)
Age: 79
End: 2019-11-15

## 2019-11-17 ENCOUNTER — MEDICATION RENEWAL (OUTPATIENT)
Age: 79
End: 2019-11-17

## 2019-12-30 NOTE — PATIENT PROFILE ADULT. - FALL HARM RISK CONCLUSION
Fall Risk no steatorrhea/no vomiting/no diarrhea/no melena/no nausea/no jaundice/no abdominal pain/no hematochezia/no hiccoughs/no constipation/no change in bowel habits/no flatulence

## 2020-01-03 ENCOUNTER — APPOINTMENT (OUTPATIENT)
Dept: RHEUMATOLOGY | Facility: CLINIC | Age: 80
End: 2020-01-03
Payer: MEDICARE

## 2020-01-03 VITALS
SYSTOLIC BLOOD PRESSURE: 122 MMHG | RESPIRATION RATE: 17 BRPM | TEMPERATURE: 98.1 F | BODY MASS INDEX: 27.46 KG/M2 | HEART RATE: 83 BPM | OXYGEN SATURATION: 97 % | WEIGHT: 155 LBS | HEIGHT: 63 IN | DIASTOLIC BLOOD PRESSURE: 72 MMHG

## 2020-01-03 DIAGNOSIS — G89.29 PAIN IN UNSPECIFIED FINGER(S): ICD-10-CM

## 2020-01-03 DIAGNOSIS — M79.646 PAIN IN UNSPECIFIED FINGER(S): ICD-10-CM

## 2020-01-03 PROCEDURE — 99215 OFFICE O/P EST HI 40 MIN: CPT

## 2020-01-03 RX ORDER — AMOXICILLIN 500 MG/1
500 CAPSULE ORAL
Qty: 12 | Refills: 0 | Status: DISCONTINUED | COMMUNITY
Start: 2019-02-06 | End: 2020-01-03

## 2020-01-04 RX ORDER — FENOPROFEN CALCIUM 600 MG/1
600 TABLET, FILM COATED ORAL
Qty: 120 | Refills: 0 | Status: DISCONTINUED | COMMUNITY
Start: 2017-11-05 | End: 2020-01-04

## 2020-01-22 ENCOUNTER — RX CHANGE (OUTPATIENT)
Age: 80
End: 2020-01-22

## 2020-01-22 RX ORDER — CELECOXIB 200 MG/1
200 CAPSULE ORAL
Qty: 90 | Refills: 0 | Status: DISCONTINUED | COMMUNITY
Start: 2020-01-04 | End: 2020-01-22

## 2020-03-15 ENCOUNTER — RX RENEWAL (OUTPATIENT)
Age: 80
End: 2020-03-15

## 2020-04-13 ENCOUNTER — RX RENEWAL (OUTPATIENT)
Age: 80
End: 2020-04-13

## 2020-06-08 ENCOUNTER — APPOINTMENT (OUTPATIENT)
Dept: RHEUMATOLOGY | Facility: CLINIC | Age: 80
End: 2020-06-08
Payer: MEDICARE

## 2020-06-08 DIAGNOSIS — M25.532 PAIN IN RIGHT WRIST: ICD-10-CM

## 2020-06-08 DIAGNOSIS — M25.531 PAIN IN RIGHT WRIST: ICD-10-CM

## 2020-06-08 DIAGNOSIS — M19.90 UNSPECIFIED OSTEOARTHRITIS, UNSPECIFIED SITE: ICD-10-CM

## 2020-06-08 PROCEDURE — 99215 OFFICE O/P EST HI 40 MIN: CPT | Mod: 95

## 2020-06-13 PROBLEM — M25.531 PAIN IN BOTH WRISTS: Status: ACTIVE | Noted: 2020-06-13

## 2020-06-13 RX ORDER — NAPROXEN 375 MG/1
375 TABLET ORAL TWICE DAILY
Qty: 180 | Refills: 0 | Status: DISCONTINUED | COMMUNITY
Start: 2020-01-22 | End: 2020-06-13

## 2020-07-01 ENCOUNTER — APPOINTMENT (OUTPATIENT)
Dept: ORTHOPEDIC SURGERY | Facility: CLINIC | Age: 80
End: 2020-07-01
Payer: MEDICARE

## 2020-07-01 VITALS
HEART RATE: 80 BPM | SYSTOLIC BLOOD PRESSURE: 135 MMHG | BODY MASS INDEX: 26.66 KG/M2 | WEIGHT: 160 LBS | DIASTOLIC BLOOD PRESSURE: 77 MMHG | HEIGHT: 65 IN

## 2020-07-01 DIAGNOSIS — Z96.651 PRESENCE OF RIGHT ARTIFICIAL KNEE JOINT: ICD-10-CM

## 2020-07-01 DIAGNOSIS — Z96.651 AFTERCARE FOLLOWING JOINT REPLACEMENT SURGERY: ICD-10-CM

## 2020-07-01 DIAGNOSIS — Z96.652 PRESENCE OF LEFT ARTIFICIAL KNEE JOINT: ICD-10-CM

## 2020-07-01 DIAGNOSIS — Z47.1 AFTERCARE FOLLOWING JOINT REPLACEMENT SURGERY: ICD-10-CM

## 2020-07-01 DIAGNOSIS — Z96.652 AFTERCARE FOLLOWING JOINT REPLACEMENT SURGERY: ICD-10-CM

## 2020-07-01 PROCEDURE — 99213 OFFICE O/P EST LOW 20 MIN: CPT

## 2020-07-01 PROCEDURE — 73562 X-RAY EXAM OF KNEE 3: CPT | Mod: 50

## 2020-07-01 NOTE — DISCUSSION/SUMMARY
[de-identified] : 79 y/o F s/p right TKA, DOS: 4/19/18, and s/p left knee TKA, DOS: 2/1/18. Both implants are in good positioning with no evidence of wear, loosening, or subsidence. She does not take antibiotics for dental work. F/u with us in 3 years for radiographic surveillance. \par \par The patient is a 80  year old individual with end stage arthritis of their bilateral knee joint. Based upon the patient's continued symptoms and failure to respond to conservative treatment I have performed a staged bilateral total knee arthroplasty for this patient. A long discussion took place with the patient describing what a total joint replacement is and what the procedure would entail. A total knee arthroplasty model, similar to the implant that will be used during the operation, was utilized to demonstrate and to discuss the various bearing surfaces of the implants. The hospitalization and post-operative care and rehabilitation were also discussed. The use of perioperative antibiotics and DVT prophylaxis were discussed. The risk, benefits and alternatives to a surgical intervention were discussed at length with the patient.  The patient was also advised of risks related to the medical comorbidities, elevated body mass index (BMI),  and smoking where applicable.  We discussed how to reduce modifiable risk factors and encouraged smoking cessation were applicable.. A lengthy discussion took place to review the most common complications including but not limited to: deep vein thrombosis, pulmonary embolus, heart attack, stroke, infection, wound breakdown, numbness, damage to nerves, tendon, muscles, arteries or other blood vessels, death and other possible complications from anesthesia. The patient was told that we will take steps to minimize these risks by using sterile technique, antibiotics and DVT prophylaxis when appropriate and follow the patient postoperatively in the office setting to monitor progress. The possibility of recurrent pain, no improvement in pain and actual worsening of pain were also discussed with the patient.\par The discharge plan of care focused on the patient going home following surgery. The patient was encouraged to make the necessary arrangements to have someone stay with them when they are discharged home. Following discharge, a home care nurse will visit the patient. The home care nurse will open your home care case and request home physical therapy services. Home physical therapy will commence following discharge provided it is appropriate and covered by the health insurance benefit plan. \par The benefits of surgery were discussed with the patient including the potential for improving his/her current clinical condition through operative intervention. Alternatives to surgical intervention including continued conservative management were also discussed in detail. All questions were answered to the satisfaction of the patient. The treatment plan of care, as well as a model of a total knee arthroplasty equivalent to the one that will be used for their total joint replacement, was shared with the patient. The patient agreed to the plan of care as well as the use of implants in their total joint replacement. \par

## 2020-07-01 NOTE — END OF VISIT
[FreeTextEntry3] : I, John King, acted solely as a scribe for Dr. Rayshawn Chambers on this date 07/01/2020.

## 2020-07-01 NOTE — PHYSICAL EXAM
[Normal] : Gait: normal [Antalgic] : not antalgic [de-identified] : GENERAL APPEARANCE: Well nourished and hydrated, pleasant, alert, and oriented x 3. Appears their stated age. \par HEENT: Normocephalic, extraocular eye motion intact. Nasal septum midline. Oral cavity clear. External auditory canal clear. \par RESPIRATORY: Breath sounds clear and audible in all lobes. No wheezing, No accessory muscle use.\par CARDIOVASCULAR: No apparent abnormalities. No lower leg edema. No varicosities. Pedal pulses are palpable.\par NEUROLOGIC: Sensation is normal, no muscle weakness in the upper or lower extremities.\par DERMATOLOGIC: No apparent skin lesions, moist, warm, no rash.\par SPINE: Cervical spine appears normal and moves freely; thoracic spine appears normal and moves freely; lumbosacral spine appears normal and moves freely, normal, nontender.\par MUSCULOSKELETAL: Hands, wrists, and elbows are normal and move freely, shoulders are normal and move freely. \par Musculoskeletal: Gait: normal.  [de-identified] : B/l knee exam showed 0-125 ROM, incision healed, no affusion, and stable [de-identified] : 3V xray of the b/l knee done in the office today and reviewed by Dr. Rayshawn Chambers demonstrates s/p implants in good positioning with no evidence of wear, loosening, or subsidence.

## 2020-07-01 NOTE — REASON FOR VISIT
[Follow-Up Visit] : a follow-up visit for [Other: ____] : [unfilled] [FreeTextEntry2] :  Right TKA, DOS: 4/19/18.S/P Left knee TKA, DOS: 2/1/18.

## 2020-07-01 NOTE — HISTORY OF PRESENT ILLNESS
[de-identified] : 81 y/o F s/p right TKA, DOS: 4/19/18. S/P Left knee TKA, DOS: 2/1/18. She is content with the surgery.  She has no limitations. [___ yrs] : [unfilled] year(s) ago [0] : an average pain level of 0/10 [Walking] : walking [None] : No relieving factors are noted [Ataxia] : no ataxia [Incontinence] : no incontinence [Loss of Dexterity] : good dexterity [Urinary Ret.] : no urinary retention

## 2020-07-09 ENCOUNTER — APPOINTMENT (OUTPATIENT)
Dept: RHEUMATOLOGY | Facility: CLINIC | Age: 80
End: 2020-07-09
Payer: MEDICARE

## 2020-07-09 DIAGNOSIS — M54.42 LUMBAGO WITH SCIATICA, LEFT SIDE: ICD-10-CM

## 2020-07-09 DIAGNOSIS — G89.29 LUMBAGO WITH SCIATICA, LEFT SIDE: ICD-10-CM

## 2020-07-09 PROCEDURE — 99214 OFFICE O/P EST MOD 30 MIN: CPT | Mod: 95

## 2020-07-13 PROBLEM — M54.42 CHRONIC BILATERAL LOW BACK PAIN WITH LEFT-SIDED SCIATICA: Status: ACTIVE | Noted: 2020-07-13

## 2020-08-14 ENCOUNTER — APPOINTMENT (OUTPATIENT)
Dept: RHEUMATOLOGY | Facility: CLINIC | Age: 80
End: 2020-08-14

## 2020-09-02 ENCOUNTER — RX RENEWAL (OUTPATIENT)
Age: 80
End: 2020-09-02

## 2020-09-11 ENCOUNTER — APPOINTMENT (OUTPATIENT)
Dept: RHEUMATOLOGY | Facility: CLINIC | Age: 80
End: 2020-09-11

## 2020-09-19 ENCOUNTER — RX RENEWAL (OUTPATIENT)
Age: 80
End: 2020-09-19

## 2020-10-14 ENCOUNTER — APPOINTMENT (OUTPATIENT)
Dept: RHEUMATOLOGY | Facility: CLINIC | Age: 80
End: 2020-10-14
Payer: MEDICARE

## 2020-10-14 VITALS
HEIGHT: 63 IN | SYSTOLIC BLOOD PRESSURE: 140 MMHG | HEART RATE: 72 BPM | DIASTOLIC BLOOD PRESSURE: 82 MMHG | BODY MASS INDEX: 28.53 KG/M2 | WEIGHT: 161 LBS | RESPIRATION RATE: 17 BRPM | OXYGEN SATURATION: 97 %

## 2020-10-14 DIAGNOSIS — G47.62 SLEEP RELATED LEG CRAMPS: ICD-10-CM

## 2020-10-14 DIAGNOSIS — Z87.898 PERSONAL HISTORY OF OTHER SPECIFIED CONDITIONS: ICD-10-CM

## 2020-10-14 PROCEDURE — 99215 OFFICE O/P EST HI 40 MIN: CPT

## 2020-10-14 RX ORDER — PNEUMOCOCCAL 23-VAL P-SAC VAC 25MCG/0.5
25 VIAL (ML) INJECTION
Qty: 1 | Refills: 0 | Status: DISCONTINUED | COMMUNITY
Start: 2018-09-25 | End: 2020-10-14

## 2020-10-14 RX ORDER — NABUMETONE 500 MG/1
500 TABLET, FILM COATED ORAL
Qty: 60 | Refills: 0 | Status: DISCONTINUED | COMMUNITY
Start: 2020-06-13 | End: 2020-10-14

## 2020-11-09 ENCOUNTER — RX CHANGE (OUTPATIENT)
Age: 80
End: 2020-11-09

## 2020-11-14 ENCOUNTER — NON-APPOINTMENT (OUTPATIENT)
Age: 80
End: 2020-11-14

## 2020-11-18 LAB
ANION GAP SERPL CALC-SCNC: 10 MMOL/L
BUN SERPL-MCNC: 14 MG/DL
CALCIUM SERPL-MCNC: 9 MG/DL
CHLORIDE SERPL-SCNC: 98 MMOL/L
CO2 SERPL-SCNC: 25 MMOL/L
CREAT SERPL-MCNC: 0.62 MG/DL
GLUCOSE SERPL-MCNC: 72 MG/DL
POTASSIUM SERPL-SCNC: 4.7 MMOL/L
SODIUM SERPL-SCNC: 133 MMOL/L

## 2020-12-11 ENCOUNTER — NON-APPOINTMENT (OUTPATIENT)
Age: 80
End: 2020-12-11

## 2021-02-01 ENCOUNTER — APPOINTMENT (OUTPATIENT)
Dept: RHEUMATOLOGY | Facility: CLINIC | Age: 81
End: 2021-02-01
Payer: MEDICARE

## 2021-02-01 DIAGNOSIS — Z87.898 PERSONAL HISTORY OF OTHER SPECIFIED CONDITIONS: ICD-10-CM

## 2021-02-01 DIAGNOSIS — M21.931 UNSPECIFIED ACQUIRED DEFORMITY OF RIGHT FOREARM: ICD-10-CM

## 2021-02-01 DIAGNOSIS — M21.932 UNSPECIFIED ACQUIRED DEFORMITY OF RIGHT FOREARM: ICD-10-CM

## 2021-02-01 PROCEDURE — 99215 OFFICE O/P EST HI 40 MIN: CPT | Mod: 95

## 2021-02-04 PROBLEM — Z87.898 HISTORY OF DRY MOUTH: Status: ACTIVE | Noted: 2020-10-14

## 2021-02-04 PROBLEM — M21.931 DEFORMITY OF BOTH WRISTS: Status: ACTIVE | Noted: 2021-02-04

## 2021-02-04 RX ORDER — PEN NEEDLE, DIABETIC 29 G X1/2"
31G X 5 MM NEEDLE, DISPOSABLE MISCELLANEOUS
Qty: 1 | Refills: 0 | Status: DISCONTINUED | COMMUNITY
Start: 2020-10-20 | End: 2021-02-04

## 2021-02-04 RX ORDER — BETAMETHASONE DIPROPIONATE 0.5 MG/G
0.05 CREAM TOPICAL
Qty: 45 | Refills: 0 | Status: DISCONTINUED | COMMUNITY
Start: 2021-01-25

## 2021-02-04 RX ORDER — TIZANIDINE 2 MG/1
2 TABLET ORAL
Qty: 90 | Refills: 0 | Status: DISCONTINUED | COMMUNITY
Start: 2020-10-14 | End: 2021-02-04

## 2021-02-04 RX ORDER — SODIUM POLYSTYRENE SULFONATE 4.1 MEQ/G
POWDER, FOR SUSPENSION ORAL; RECTAL
Qty: 60 | Refills: 0 | Status: DISCONTINUED | COMMUNITY
Start: 2020-12-10

## 2021-02-04 RX ORDER — SULINDAC 150 MG/1
150 TABLET ORAL
Qty: 60 | Refills: 0 | Status: DISCONTINUED | COMMUNITY
Start: 2020-10-14 | End: 2021-02-04

## 2021-02-04 RX ORDER — FLUOXETINE HYDROCHLORIDE 10 MG/1
10 CAPSULE ORAL
Qty: 90 | Refills: 0 | Status: DISCONTINUED | COMMUNITY
Start: 2021-01-04

## 2021-02-04 RX ORDER — AMLODIPINE BESYLATE 10 MG/1
10 TABLET ORAL
Qty: 30 | Refills: 0 | Status: DISCONTINUED | COMMUNITY
Start: 2021-01-07

## 2021-02-04 RX ORDER — ABALOPARATIDE 2000 UG/ML
3120 INJECTION, SOLUTION SUBCUTANEOUS
Qty: 3 | Refills: 0 | Status: DISCONTINUED | COMMUNITY
Start: 2020-06-13 | End: 2021-02-04

## 2021-02-04 RX ORDER — ABALOPARATIDE 2000 UG/ML
3120 INJECTION, SOLUTION SUBCUTANEOUS
Qty: 3 | Refills: 0 | Status: DISCONTINUED | COMMUNITY
Start: 2020-10-17 | End: 2021-02-04

## 2021-02-04 RX ORDER — PEN NEEDLE, DIABETIC 29 G X1/2"
31G X 5 MM NEEDLE, DISPOSABLE MISCELLANEOUS
Qty: 1 | Refills: 0 | Status: DISCONTINUED | COMMUNITY
Start: 2020-06-13 | End: 2021-02-04

## 2021-04-15 ENCOUNTER — APPOINTMENT (OUTPATIENT)
Dept: RHEUMATOLOGY | Facility: CLINIC | Age: 81
End: 2021-04-15
Payer: MEDICARE

## 2021-04-15 VITALS — DIASTOLIC BLOOD PRESSURE: 82 MMHG | SYSTOLIC BLOOD PRESSURE: 123 MMHG

## 2021-04-15 VITALS
DIASTOLIC BLOOD PRESSURE: 80 MMHG | TEMPERATURE: 97.6 F | SYSTOLIC BLOOD PRESSURE: 156 MMHG | HEART RATE: 80 BPM | RESPIRATION RATE: 16 BRPM | OXYGEN SATURATION: 98 %

## 2021-04-15 PROCEDURE — 96372 THER/PROPH/DIAG INJ SC/IM: CPT

## 2021-04-15 RX ORDER — DENOSUMAB 60 MG/ML
60 INJECTION SUBCUTANEOUS
Qty: 1 | Refills: 0 | Status: COMPLETED | OUTPATIENT
Start: 2021-04-12

## 2021-05-19 ENCOUNTER — APPOINTMENT (OUTPATIENT)
Dept: RHEUMATOLOGY | Facility: CLINIC | Age: 81
End: 2021-05-19
Payer: MEDICARE

## 2021-05-19 VITALS
WEIGHT: 165 LBS | SYSTOLIC BLOOD PRESSURE: 110 MMHG | HEIGHT: 63 IN | HEART RATE: 77 BPM | OXYGEN SATURATION: 96 % | BODY MASS INDEX: 29.23 KG/M2 | RESPIRATION RATE: 17 BRPM | TEMPERATURE: 98 F | DIASTOLIC BLOOD PRESSURE: 64 MMHG

## 2021-05-19 PROCEDURE — 99215 OFFICE O/P EST HI 40 MIN: CPT

## 2021-05-19 RX ORDER — HYDROCODONE BITARTRATE AND ACETAMINOPHEN 5; 325 MG/1; MG/1
5-325 TABLET ORAL
Qty: 15 | Refills: 0 | Status: DISCONTINUED | COMMUNITY
Start: 2019-05-15 | End: 2021-05-19

## 2021-05-19 RX ORDER — PENICILLIN V POTASSIUM 500 MG/1
500 TABLET, FILM COATED ORAL
Qty: 28 | Refills: 0 | Status: DISCONTINUED | COMMUNITY
Start: 2019-05-15 | End: 2021-05-19

## 2021-05-20 ENCOUNTER — NON-APPOINTMENT (OUTPATIENT)
Age: 81
End: 2021-05-20

## 2021-05-26 RX ORDER — KETOPROFEN 200 MG/1
200 CAPSULE, EXTENDED RELEASE ORAL
Qty: 90 | Refills: 0 | Status: DISCONTINUED | COMMUNITY
Start: 2021-05-19 | End: 2021-05-26

## 2021-05-28 ENCOUNTER — NON-APPOINTMENT (OUTPATIENT)
Age: 81
End: 2021-05-28

## 2021-06-02 ENCOUNTER — RX RENEWAL (OUTPATIENT)
Age: 81
End: 2021-06-02

## 2021-10-06 ENCOUNTER — APPOINTMENT (OUTPATIENT)
Dept: RHEUMATOLOGY | Facility: CLINIC | Age: 81
End: 2021-10-06
Payer: MEDICARE

## 2021-10-06 ENCOUNTER — LABORATORY RESULT (OUTPATIENT)
Age: 81
End: 2021-10-06

## 2021-10-06 VITALS
DIASTOLIC BLOOD PRESSURE: 70 MMHG | WEIGHT: 165 LBS | HEIGHT: 63 IN | OXYGEN SATURATION: 98 % | RESPIRATION RATE: 17 BRPM | HEART RATE: 80 BPM | BODY MASS INDEX: 29.23 KG/M2 | TEMPERATURE: 97 F | SYSTOLIC BLOOD PRESSURE: 100 MMHG

## 2021-10-06 DIAGNOSIS — M79.605 PAIN IN RIGHT LEG: ICD-10-CM

## 2021-10-06 DIAGNOSIS — M79.604 PAIN IN RIGHT LEG: ICD-10-CM

## 2021-10-06 DIAGNOSIS — M79.671 PAIN IN RIGHT LEG: ICD-10-CM

## 2021-10-06 DIAGNOSIS — M79.672 PAIN IN RIGHT LEG: ICD-10-CM

## 2021-10-06 PROCEDURE — 81003 URINALYSIS AUTO W/O SCOPE: CPT | Mod: QW

## 2021-10-06 PROCEDURE — 36415 COLL VENOUS BLD VENIPUNCTURE: CPT

## 2021-10-06 PROCEDURE — 99214 OFFICE O/P EST MOD 30 MIN: CPT | Mod: 25

## 2021-10-06 RX ORDER — AMOXICILLIN 500 MG/1
500 TABLET, FILM COATED ORAL
Qty: 4 | Refills: 1 | Status: DISCONTINUED | COMMUNITY
Start: 2020-12-08 | End: 2021-10-06

## 2021-10-06 RX ORDER — CLINDAMYCIN HYDROCHLORIDE 150 MG/1
150 CAPSULE ORAL
Qty: 20 | Refills: 0 | Status: DISCONTINUED | COMMUNITY
Start: 2019-02-12 | End: 2021-10-06

## 2021-10-08 LAB
BILIRUB UR QL STRIP: NORMAL
CLARITY UR: CLEAR
COLLECTION METHOD: NORMAL
GLUCOSE UR-MCNC: NORMAL
HCG UR QL: 0.2 EU/DL
HGB UR QL STRIP.AUTO: NORMAL
KETONES UR-MCNC: NORMAL
LEUKOCYTE ESTERASE UR QL STRIP: NORMAL
NITRITE UR QL STRIP: NORMAL
PH UR STRIP: 7.5
PROT UR STRIP-MCNC: NORMAL
SP GR UR STRIP: 1.01

## 2021-10-10 LAB
25(OH)D3 SERPL-MCNC: 75.1 NG/ML
ALBUMIN SERPL ELPH-MCNC: 4.5 G/DL
ALP BLD-CCNC: 109 U/L
ALT SERPL-CCNC: 19 U/L
ANION GAP SERPL CALC-SCNC: 13 MMOL/L
AST SERPL-CCNC: 26 U/L
B BURGDOR IGG+IGM SER QL IB: NORMAL
BASOPHILS # BLD AUTO: 0.03 K/UL
BASOPHILS NFR BLD AUTO: 0.6 %
BILIRUB SERPL-MCNC: 0.3 MG/DL
BUN SERPL-MCNC: 13 MG/DL
CALCIUM SERPL-MCNC: 9.5 MG/DL
CCP AB SER IA-ACNC: <8 UNITS
CHLORIDE SERPL-SCNC: 103 MMOL/L
CO2 SERPL-SCNC: 24 MMOL/L
CREAT SERPL-MCNC: 0.63 MG/DL
CRP SERPL-MCNC: <3 MG/L
DEPRECATED KAPPA LC FREE/LAMBDA SER: 1.25 RATIO
ENA SS-A AB SER IA-ACNC: <0.2 AL
ENA SS-B AB SER IA-ACNC: <0.2 AL
EOSINOPHIL # BLD AUTO: 0.27 K/UL
EOSINOPHIL NFR BLD AUTO: 5.5 %
ERYTHROCYTE [SEDIMENTATION RATE] IN BLOOD BY WESTERGREN METHOD: 8 MM/HR
GLUCOSE SERPL-MCNC: 79 MG/DL
HCT VFR BLD CALC: 42.5 %
HGB BLD-MCNC: 14.1 G/DL
IGA SER QL IEP: 107 MG/DL
IGG SER QL IEP: 901 MG/DL
IGM SER QL IEP: 146 MG/DL
IMM GRANULOCYTES NFR BLD AUTO: 0 %
KAPPA LC CSF-MCNC: 1.59 MG/DL
KAPPA LC SERPL-MCNC: 1.98 MG/DL
LDH SERPL-CCNC: 223 U/L
LYMPHOCYTES # BLD AUTO: 0.95 K/UL
LYMPHOCYTES NFR BLD AUTO: 19.3 %
M PROTEIN SPEC IFE-MCNC: NORMAL
MAGNESIUM SERPL-MCNC: 2.1 MG/DL
MAN DIFF?: NORMAL
MCHC RBC-ENTMCNC: 31.3 PG
MCHC RBC-ENTMCNC: 33.2 GM/DL
MCV RBC AUTO: 94.4 FL
MONOCYTES # BLD AUTO: 0.51 K/UL
MONOCYTES NFR BLD AUTO: 10.3 %
NEUTROPHILS # BLD AUTO: 3.17 K/UL
NEUTROPHILS NFR BLD AUTO: 64.3 %
PHOSPHATE SERPL-MCNC: 3.8 MG/DL
PLATELET # BLD AUTO: 284 K/UL
POTASSIUM SERPL-SCNC: 4.4 MMOL/L
PROT SERPL-MCNC: 6.5 G/DL
RBC # BLD: 4.5 M/UL
RBC # FLD: 12.9 %
RF+CCP IGG SER-IMP: NEGATIVE
RHEUMATOID FACT SER QL: <10 IU/ML
SODIUM SERPL-SCNC: 141 MMOL/L
TSH SERPL-ACNC: 1.62 UIU/ML
WBC # FLD AUTO: 4.93 K/UL

## 2021-10-12 ENCOUNTER — NON-APPOINTMENT (OUTPATIENT)
Age: 81
End: 2021-10-12

## 2021-10-18 ENCOUNTER — NON-APPOINTMENT (OUTPATIENT)
Age: 81
End: 2021-10-18

## 2021-10-19 ENCOUNTER — APPOINTMENT (OUTPATIENT)
Dept: RHEUMATOLOGY | Facility: CLINIC | Age: 81
End: 2021-10-19
Payer: MEDICARE

## 2021-10-19 VITALS
SYSTOLIC BLOOD PRESSURE: 164 MMHG | HEART RATE: 71 BPM | OXYGEN SATURATION: 97 % | RESPIRATION RATE: 17 BRPM | TEMPERATURE: 98.1 F | DIASTOLIC BLOOD PRESSURE: 88 MMHG

## 2021-10-19 PROCEDURE — 96372 THER/PROPH/DIAG INJ SC/IM: CPT

## 2021-10-19 RX ORDER — DENOSUMAB 60 MG/ML
60 INJECTION SUBCUTANEOUS
Qty: 1 | Refills: 0 | Status: COMPLETED | OUTPATIENT
Start: 2021-10-11

## 2021-10-23 ENCOUNTER — NON-APPOINTMENT (OUTPATIENT)
Age: 81
End: 2021-10-23

## 2021-11-21 ENCOUNTER — RX CHANGE (OUTPATIENT)
Age: 81
End: 2021-11-21

## 2021-12-21 ENCOUNTER — RX RENEWAL (OUTPATIENT)
Age: 81
End: 2021-12-21

## 2022-01-16 ENCOUNTER — RX RENEWAL (OUTPATIENT)
Age: 82
End: 2022-01-16

## 2022-04-07 ENCOUNTER — APPOINTMENT (OUTPATIENT)
Dept: RHEUMATOLOGY | Facility: CLINIC | Age: 82
End: 2022-04-07
Payer: MEDICARE

## 2022-04-07 VITALS
RESPIRATION RATE: 17 BRPM | OXYGEN SATURATION: 97 % | DIASTOLIC BLOOD PRESSURE: 84 MMHG | HEART RATE: 81 BPM | HEIGHT: 63 IN | WEIGHT: 153 LBS | BODY MASS INDEX: 27.11 KG/M2 | TEMPERATURE: 97.7 F | SYSTOLIC BLOOD PRESSURE: 130 MMHG

## 2022-04-07 DIAGNOSIS — M54.50 LOW BACK PAIN, UNSPECIFIED: ICD-10-CM

## 2022-04-07 DIAGNOSIS — R68.84 JAW PAIN: ICD-10-CM

## 2022-04-07 DIAGNOSIS — G89.29 LOW BACK PAIN, UNSPECIFIED: ICD-10-CM

## 2022-04-07 PROCEDURE — G2212 PROLONG OUTPT/OFFICE VIS: CPT

## 2022-04-07 PROCEDURE — 81003 URINALYSIS AUTO W/O SCOPE: CPT | Mod: QW

## 2022-04-07 PROCEDURE — 99215 OFFICE O/P EST HI 40 MIN: CPT | Mod: 25

## 2022-04-07 PROCEDURE — 36415 COLL VENOUS BLD VENIPUNCTURE: CPT

## 2022-04-07 RX ORDER — PIROXICAM 10 MG/1
10 CAPSULE ORAL
Qty: 60 | Refills: 0 | Status: DISCONTINUED | COMMUNITY
Start: 2021-05-26 | End: 2022-04-07

## 2022-04-07 RX ORDER — FLURBIPROFEN 100 MG
100 TABLET ORAL
Qty: 180 | Refills: 0 | Status: DISCONTINUED | COMMUNITY
Start: 2021-10-12 | End: 2022-04-07

## 2022-04-17 LAB
25(OH)D3 SERPL-MCNC: 80.8 NG/ML
ALBUMIN MFR SERPL ELPH: 64.1 %
ALBUMIN SERPL ELPH-MCNC: 4.6 G/DL
ALBUMIN SERPL-MCNC: 4.3 G/DL
ALBUMIN/GLOB SERPL: 1.8 RATIO
ALP BLD-CCNC: 71 U/L
ALPHA1 GLOB MFR SERPL ELPH: 4.2 %
ALPHA1 GLOB SERPL ELPH-MCNC: 0.3 G/DL
ALPHA2 GLOB MFR SERPL ELPH: 9.7 %
ALPHA2 GLOB SERPL ELPH-MCNC: 0.6 G/DL
ALT SERPL-CCNC: 13 U/L
ANION GAP SERPL CALC-SCNC: 13 MMOL/L
AST SERPL-CCNC: 21 U/L
B-GLOBULIN MFR SERPL ELPH: 9.8 %
B-GLOBULIN SERPL ELPH-MCNC: 0.7 G/DL
BASOPHILS # BLD AUTO: 0.03 K/UL
BASOPHILS NFR BLD AUTO: 0.7 %
BILIRUB SERPL-MCNC: 0.3 MG/DL
BILIRUB UR QL STRIP: NEGATIVE
BUN SERPL-MCNC: 11 MG/DL
CALCIUM SERPL-MCNC: 9.4 MG/DL
CCP AB SER IA-ACNC: <8 UNITS
CHLORIDE SERPL-SCNC: 102 MMOL/L
CLARITY UR: CLEAR
CO2 SERPL-SCNC: 25 MMOL/L
COLLECTION METHOD: NORMAL
CREAT SERPL-MCNC: 0.6 MG/DL
CRP SERPL-MCNC: <3 MG/L
DEPRECATED KAPPA LC FREE/LAMBDA SER: 1.63 RATIO
EGFR: 91 ML/MIN/1.73M2
ENA SS-A AB SER IA-ACNC: <0.2 AL
ENA SS-B AB SER IA-ACNC: <0.2 AL
EOSINOPHIL # BLD AUTO: 0.25 K/UL
EOSINOPHIL NFR BLD AUTO: 5.7 %
ERYTHROCYTE [SEDIMENTATION RATE] IN BLOOD BY WESTERGREN METHOD: 3 MM/HR
GAMMA GLOB FLD ELPH-MCNC: 0.8 G/DL
GAMMA GLOB MFR SERPL ELPH: 12.2 %
GLUCOSE SERPL-MCNC: 82 MG/DL
GLUCOSE UR-MCNC: NEGATIVE
HCG UR QL: 0.2 EU/DL
HCT VFR BLD CALC: 41.1 %
HGB BLD-MCNC: 13.4 G/DL
HGB UR QL STRIP.AUTO: NORMAL
IGA SER QL IEP: 116 MG/DL
IGG SER QL IEP: 873 MG/DL
IGM SER QL IEP: 92 MG/DL
IMM GRANULOCYTES NFR BLD AUTO: 0.5 %
INTERPRETATION SERPL IEP-IMP: NORMAL
KAPPA LC CSF-MCNC: 1.45 MG/DL
KAPPA LC SERPL-MCNC: 2.37 MG/DL
KETONES UR-MCNC: NEGATIVE
LDH SERPL-CCNC: 208 U/L
LEUKOCYTE ESTERASE UR QL STRIP: NORMAL
LYMPHOCYTES # BLD AUTO: 0.8 K/UL
LYMPHOCYTES NFR BLD AUTO: 18.2 %
M PROTEIN SPEC IFE-MCNC: NORMAL
MAN DIFF?: NORMAL
MCHC RBC-ENTMCNC: 30.6 PG
MCHC RBC-ENTMCNC: 32.6 GM/DL
MCV RBC AUTO: 93.8 FL
MONOCYTES # BLD AUTO: 0.51 K/UL
MONOCYTES NFR BLD AUTO: 11.6 %
NEUTROPHILS # BLD AUTO: 2.78 K/UL
NEUTROPHILS NFR BLD AUTO: 63.3 %
NITRITE UR QL STRIP: NEGATIVE
PH UR STRIP: 7
PHOSPHATE SERPL-MCNC: 3.9 MG/DL
PLATELET # BLD AUTO: 295 K/UL
POTASSIUM SERPL-SCNC: 4.7 MMOL/L
PROT SERPL-MCNC: 6.7 G/DL
PROT UR STRIP-MCNC: NEGATIVE
RBC # BLD: 4.38 M/UL
RBC # FLD: 12.2 %
RF+CCP IGG SER-IMP: NEGATIVE
RHEUMATOID FACT SER QL: <10 IU/ML
SODIUM SERPL-SCNC: 139 MMOL/L
SP GR UR STRIP: 1.01
WBC # FLD AUTO: 4.39 K/UL

## 2022-04-17 NOTE — REVIEW OF SYSTEMS
[As Noted in HPI] : as noted in HPI [Joint Pain] : joint pain [Negative] : Heme/Lymph [Dry Eyes] : no dryness of the eyes

## 2022-04-17 NOTE — ADDENDUM
[FreeTextEntry1] : I, Mike Milian, acted solely as a scribe for Dr. Myron I. Kleiner, MD. on 04/07/2022 .

## 2022-04-17 NOTE — HISTORY OF PRESENT ILLNESS
[FreeTextEntry1] : 81 year-old woman for follow up office visit regarding her joint symptoms and rheumatic diseases, including osteoarthritis, osteoporosis, Sjogren's syndrome, bilateral carpal tunnel syndrome, chronic low back pain.\par \par Note: Patient last seen in October 2021. \par \par Patient has been feeling achy-- including right foot and bilateral second DIPs. Denies recent dry eyes and dry mouth, using artificial tears, biotene mouthwash and oral hydration with relief. Patient discontinued flurbiprofen 100 mg secondary to feeling much better. Recent history for last six weeks of right upper jaw pain, especially when eating -- has not seen dentist and wants to consider discontinuing Prolia.  Last Prolia injection was in October 2021.  The patient continues calcium and vitamin D supplements. Patient denies rash or side effects with current medications. Patient is content with current medication regimen. \par \par PMH:\par S/P coronavirus vaccine x3 without complication\par COVID-19 infection summer 2021- fatigue and ?other symptoms\par  \par \par

## 2022-04-17 NOTE — CONSULT LETTER
[Dear  ___] : Dear  [unfilled], [Consult Letter:] : I had the pleasure of evaluating your patient, [unfilled]. [Please see my note below.] : Please see my note below. [Consult Closing:] : Thank you very much for allowing me to participate in the care of this patient.  If you have any questions, please do not hesitate to contact me. [Sincerely,] : Sincerely, [FreeTextEntry3] : Sunil\par Myron I. Kleiner, M.D., FACR \par Chief, Division of Rheumatology\par Department of Medicine \par [facility]

## 2022-04-17 NOTE — ASSESSMENT
[FreeTextEntry1] : Impression: 81 year-old woman for follow up office visit regarding her joint symptoms and rheumatic diseases, including osteoarthritis, osteoporosis, Sjogren's syndrome, bilateral carpal tunnel syndrome, chronic low back pain.\par \par Note: Patient last seen in October 2021. \par \par Patient has been feeling achy secondary to her osteoarthritis-- including right foot and bilateral second DIPs. Recent bone densitometry revealed multiple points of osteoporosis. Recent lab tests revealed no significant results or changes, with extensive discussion. Denies recent dry eyes and dry mouth secondary to her Sjogren's syndrom under better control, using artificial tears, biotene mouthwash and oral hydration with relief. Patient discontinued flurbiprofen 100 mg secondary to feeling much better. The patient continues calcium and vitamin D supplements. Patient denies rash or side effects with current medications. Patient is content with current medication regimen.  6-week history of right upper jaw pain--- she did not yet see the dentist--?  Etiology--she is concerned about the Prolia.\par \par Plan: I reviewed previous lab results with patient with extensive discussion\par Laboratory tests ordered today-see list below--with coordination of care\par X-rays ordered today-see list below--with coordination of care\par Diagnosis and prognosis discussed\par Continue other current medications (other than those changed below)\par Change Duloxetine ER 40 mg q.d. in the morning instead of 20 mg b.i.d (possible side effects explained) \par Consider restarting Flurbiprofen-- patient declined, wants to self-monitory progress\par Dental consultation regarding jaw pain--- ASAP--emphasized\par Postpone next prolia injection for now--awaiting dental consult\par Artificial tears one drop each eye q.i.d. and p.r.n.(Possible side effects explained)\par Biotin mouthwash/spray q.i.d. and p.r.n.(Possible side effects explained)\par Oral hydration\par Urged for 4TH coronavirus vaccine dose\par Return visit 3 months\par Total time for this office visit, including face-to-face time and nonface-to-face time, 85 minutes--- including review the chart and previous records, review of her previous lab results with extensive discussion with the patient, ordering of new lab tests with coordination of care, review of previous imaging reports, ordering of new x-rays with coordination of care, extensive discussion regarding her jaw pain and need for dental consultation ASAP, detailed medication history, review of her medications going forward with her possible side effects, reviewed the impact of her rheumatic disease on her other medical problems, reviewed the impact of her other medical problems on her rheumatic disease\par

## 2022-04-18 ENCOUNTER — NON-APPOINTMENT (OUTPATIENT)
Age: 82
End: 2022-04-18

## 2022-04-27 ENCOUNTER — APPOINTMENT (OUTPATIENT)
Dept: RHEUMATOLOGY | Facility: CLINIC | Age: 82
End: 2022-04-27

## 2022-05-19 ENCOUNTER — NON-APPOINTMENT (OUTPATIENT)
Age: 82
End: 2022-05-19

## 2022-06-02 ENCOUNTER — APPOINTMENT (OUTPATIENT)
Dept: RHEUMATOLOGY | Facility: CLINIC | Age: 82
End: 2022-06-02
Payer: MEDICARE

## 2022-06-02 VITALS
DIASTOLIC BLOOD PRESSURE: 83 MMHG | TEMPERATURE: 98 F | SYSTOLIC BLOOD PRESSURE: 123 MMHG | OXYGEN SATURATION: 95 % | HEART RATE: 74 BPM | RESPIRATION RATE: 18 BRPM

## 2022-06-02 PROCEDURE — 96372 THER/PROPH/DIAG INJ SC/IM: CPT

## 2022-06-02 RX ORDER — DENOSUMAB 60 MG/ML
60 INJECTION SUBCUTANEOUS
Qty: 1 | Refills: 0 | Status: COMPLETED | OUTPATIENT
Start: 2022-04-19

## 2022-06-05 ENCOUNTER — RX RENEWAL (OUTPATIENT)
Age: 82
End: 2022-06-05

## 2022-06-24 ENCOUNTER — NON-APPOINTMENT (OUTPATIENT)
Age: 82
End: 2022-06-24

## 2022-07-19 ENCOUNTER — APPOINTMENT (OUTPATIENT)
Dept: RHEUMATOLOGY | Facility: CLINIC | Age: 82
End: 2022-07-19

## 2022-07-19 ENCOUNTER — LABORATORY RESULT (OUTPATIENT)
Age: 82
End: 2022-07-19

## 2022-07-19 VITALS
HEIGHT: 63 IN | HEART RATE: 69 BPM | BODY MASS INDEX: 25.52 KG/M2 | SYSTOLIC BLOOD PRESSURE: 120 MMHG | RESPIRATION RATE: 17 BRPM | WEIGHT: 144 LBS | TEMPERATURE: 97.9 F | DIASTOLIC BLOOD PRESSURE: 80 MMHG | OXYGEN SATURATION: 98 %

## 2022-07-19 DIAGNOSIS — R20.2 PARESTHESIA OF SKIN: ICD-10-CM

## 2022-07-19 DIAGNOSIS — G56.03 CARPAL TUNNEL SYNDROM,BILATERAL UPPER LIMBS: ICD-10-CM

## 2022-07-19 PROCEDURE — 81003 URINALYSIS AUTO W/O SCOPE: CPT | Mod: QW

## 2022-07-19 PROCEDURE — 36415 COLL VENOUS BLD VENIPUNCTURE: CPT

## 2022-07-19 PROCEDURE — 99215 OFFICE O/P EST HI 40 MIN: CPT | Mod: 25

## 2022-08-01 LAB
25(OH)D3 SERPL-MCNC: 77.2 NG/ML
ALBUMIN SERPL ELPH-MCNC: 4.5 G/DL
ALP BLD-CCNC: 79 U/L
ALT SERPL-CCNC: 14 U/L
ANION GAP SERPL CALC-SCNC: 10 MMOL/L
AST SERPL-CCNC: 22 U/L
BASOPHILS # BLD AUTO: 0.02 K/UL
BASOPHILS NFR BLD AUTO: 0.4 %
BILIRUB SERPL-MCNC: 0.2 MG/DL
BILIRUB UR QL STRIP: NEGATIVE
BUN SERPL-MCNC: 12 MG/DL
CALCIUM SERPL-MCNC: 9.6 MG/DL
CCP AB SER IA-ACNC: <8 UNITS
CHLORIDE SERPL-SCNC: 99 MMOL/L
CLARITY UR: CLEAR
CO2 SERPL-SCNC: 27 MMOL/L
COLLECTION METHOD: NORMAL
CREAT SERPL-MCNC: 0.63 MG/DL
CRP SERPL-MCNC: <3 MG/L
EGFR: 90 ML/MIN/1.73M2
ENA SS-A AB SER IA-ACNC: <0.2 AL
ENA SS-B AB SER IA-ACNC: <0.2 AL
EOSINOPHIL # BLD AUTO: 0.25 K/UL
EOSINOPHIL NFR BLD AUTO: 4.6 %
ERYTHROCYTE [SEDIMENTATION RATE] IN BLOOD BY WESTERGREN METHOD: 3 MM/HR
FOLATE SERPL-MCNC: >20 NG/ML
GLUCOSE SERPL-MCNC: 100 MG/DL
GLUCOSE UR-MCNC: NEGATIVE
HCG UR QL: 0.2 EU/DL
HCT VFR BLD CALC: 40.9 %
HGB BLD-MCNC: 12.9 G/DL
HGB UR QL STRIP.AUTO: NEGATIVE
IMM GRANULOCYTES NFR BLD AUTO: 0.4 %
KETONES UR-MCNC: NEGATIVE
LDH SERPL-CCNC: 201 U/L
LEUKOCYTE ESTERASE UR QL STRIP: NEGATIVE
LYMPHOCYTES # BLD AUTO: 1.17 K/UL
LYMPHOCYTES NFR BLD AUTO: 21.4 %
MAN DIFF?: NORMAL
MCHC RBC-ENTMCNC: 31.1 PG
MCHC RBC-ENTMCNC: 31.5 GM/DL
MCV RBC AUTO: 98.6 FL
MONOCYTES # BLD AUTO: 0.6 K/UL
MONOCYTES NFR BLD AUTO: 11 %
NEUTROPHILS # BLD AUTO: 3.41 K/UL
NEUTROPHILS NFR BLD AUTO: 62.2 %
NITRITE UR QL STRIP: NEGATIVE
PH UR STRIP: 6
PHOSPHATE SERPL-MCNC: 4 MG/DL
PLATELET # BLD AUTO: 302 K/UL
POTASSIUM SERPL-SCNC: 5.1 MMOL/L
PROT SERPL-MCNC: 6.6 G/DL
PROT UR STRIP-MCNC: NEGATIVE
RBC # BLD: 4.15 M/UL
RBC # FLD: 12.9 %
RF+CCP IGG SER-IMP: NEGATIVE
RHEUMATOID FACT SER QL: <10 IU/ML
SODIUM SERPL-SCNC: 137 MMOL/L
SP GR UR STRIP: 1.01
TSH SERPL-ACNC: 1.57 UIU/ML
VIT B12 SERPL-MCNC: 659 PG/ML
WBC # FLD AUTO: 5.47 K/UL

## 2022-08-01 NOTE — ASSESSMENT
[FreeTextEntry1] : Impression: 81 year-old woman for follow up office visit regarding her joint symptoms and rheumatic diseases, including osteoarthritis, osteoporosis, Sjogren's syndrome, bilateral carpal tunnel syndrome, chronic low back pain.\par \par Patient feels fairly well. Denies recent joint pain secondary to her osteoarthritis.  She has dry eyes and dry mouth secondary to her Sjogren's syndrome, using artificial tears, biotene mouthwash and oral hydration with relief. She reports no recent jaw pain-- dentist visit stable.  She reports bilateral hand numbness throughout the day -- which  is secondary to her bilateral carpal tunnel syndrome. Patient continues Prolia s.q. q.6 months (last June 2, 2022) without complication. Recent x-ray tests revealed left wrist old fracture, hiatal hernia, no significant results or changes, with extensive discussion. The patient continues calcium and vitamin D supplements as well as the Prolia as noted above for her osteoporosis. Patient denies rash or side effects with current medications. Patient is content with current medication regimen\par \par Plan: I reviewed previous lab results with patient with extensive discussion\par Laboratory tests ordered today-see list below--with coordination of care\par Diagnosis and prognosis discussed\par Continue other current medications (other than those changed below)\par Tylenol 1000 mg t.i.d. p.r.n. or Tylenol 1300 mg b.i.d. p.r.n. (possible side effects explained) \par Prolia 60 mg subcutaneous--to be authorized and performed after December 5, 2022--- with coordination of care with the authorization team and nursing staff\par Patient declined NSAIDs at this time.\par Artificial tears one drop each eye q.i.d. and p.r.n.(Possible side effects explained)\par Biotin mouthwash/spray q.i.d. and p.r.n.(Possible side effects explained)\par Oral hydration\par Patient declining oral medication for dryness\par Return visit 3 months\par \par

## 2022-08-01 NOTE — HISTORY OF PRESENT ILLNESS
[FreeTextEntry1] : 82 year-old woman for follow up office visit regarding her joint symptoms and rheumatic diseases, including osteoarthritis, osteoporosis, Sjogren's syndrome, bilateral carpal tunnel syndrome, chronic low back pain.\par \par Patient feels fairly well. Denies recent joint pain.  She has dry eyes and dry mouth, using artificial tears, biotene mouthwash and oral hydration with relief. She reports bilateral hand numbness throughout the day. She reports no recent jaw pain. Patient continues Prolia s.q. q.6 months (last June 2, 2022) without complication. The patient continues calcium and vitamin D supplements. Patient denies rash or side effects with current medications. Patient is content with current medication regimen. \par \par PMH: Status post COVID-vaccine x3--without complication\par \par \par

## 2022-08-01 NOTE — ADDENDUM
[FreeTextEntry1] : I, Mike Milian, acted solely as a scribe for Dr. Myron I. Kleiner, MD. on 07/19/2022 .

## 2022-08-01 NOTE — CONSULT LETTER
[Dear  ___] : Dear  [unfilled], [Consult Letter:] : I had the pleasure of evaluating your patient, [unfilled]. [Please see my note below.] : Please see my note below. [Consult Closing:] : Thank you very much for allowing me to participate in the care of this patient.  If you have any questions, please do not hesitate to contact me. [Sincerely,] : Sincerely, [FreeTextEntry3] : Sunil\par Myron I. Kleiner, M.D., FACR \par Chief, Division of Rheumatology\par Department of Medicine \par [facility]

## 2022-10-10 ENCOUNTER — APPOINTMENT (OUTPATIENT)
Dept: RHEUMATOLOGY | Facility: CLINIC | Age: 82
End: 2022-10-10

## 2022-10-10 VITALS
WEIGHT: 143 LBS | BODY MASS INDEX: 25.34 KG/M2 | TEMPERATURE: 97.8 F | SYSTOLIC BLOOD PRESSURE: 130 MMHG | OXYGEN SATURATION: 96 % | HEIGHT: 63 IN | HEART RATE: 104 BPM | DIASTOLIC BLOOD PRESSURE: 80 MMHG

## 2022-10-10 PROCEDURE — 81003 URINALYSIS AUTO W/O SCOPE: CPT | Mod: QW

## 2022-10-10 PROCEDURE — 99214 OFFICE O/P EST MOD 30 MIN: CPT | Mod: 25

## 2022-10-10 PROCEDURE — 36415 COLL VENOUS BLD VENIPUNCTURE: CPT

## 2022-10-10 RX ORDER — PREDNISONE 10 MG/1
10 TABLET ORAL
Qty: 35 | Refills: 0 | Status: DISCONTINUED | COMMUNITY
Start: 2021-10-12 | End: 2022-10-10

## 2022-10-22 LAB
25(OH)D3 SERPL-MCNC: 60.4 NG/ML
ALBUMIN SERPL ELPH-MCNC: 4.3 G/DL
ALDOLASE SERPL-CCNC: 3.2 U/L
ALP BLD-CCNC: 75 U/L
ALT SERPL-CCNC: 14 U/L
ANION GAP SERPL CALC-SCNC: 9 MMOL/L
AST SERPL-CCNC: 20 U/L
BASOPHILS # BLD AUTO: 0.03 K/UL
BASOPHILS NFR BLD AUTO: 0.7 %
BILIRUB SERPL-MCNC: 0.3 MG/DL
BILIRUB UR QL STRIP: NORMAL
BUN SERPL-MCNC: 11 MG/DL
CALCIUM SERPL-MCNC: 9.5 MG/DL
CHLORIDE SERPL-SCNC: 101 MMOL/L
CK SERPL-CCNC: 80 U/L
CLARITY UR: CLEAR
CO2 SERPL-SCNC: 29 MMOL/L
COLLECTION METHOD: NORMAL
CREAT SERPL-MCNC: 0.66 MG/DL
CRP SERPL-MCNC: <3 MG/L
EGFR: 89 ML/MIN/1.73M2
ENA SS-A AB SER IA-ACNC: <0.2 AL
ENA SS-B AB SER IA-ACNC: <0.2 AL
EOSINOPHIL # BLD AUTO: 0.19 K/UL
EOSINOPHIL NFR BLD AUTO: 4.5 %
ERYTHROCYTE [SEDIMENTATION RATE] IN BLOOD BY WESTERGREN METHOD: 8 MM/HR
GLUCOSE SERPL-MCNC: 85 MG/DL
GLUCOSE UR-MCNC: NORMAL
HCG UR QL: 0.2 EU/DL
HCT VFR BLD CALC: 41 %
HGB BLD-MCNC: 13.5 G/DL
HGB UR QL STRIP.AUTO: NORMAL
IMM GRANULOCYTES NFR BLD AUTO: 0.2 %
KETONES UR-MCNC: NORMAL
LDH SERPL-CCNC: 196 U/L
LEUKOCYTE ESTERASE UR QL STRIP: NORMAL
LYMPHOCYTES # BLD AUTO: 0.88 K/UL
LYMPHOCYTES NFR BLD AUTO: 20.8 %
MAGNESIUM SERPL-MCNC: 2.1 MG/DL
MAN DIFF?: NORMAL
MCHC RBC-ENTMCNC: 30.8 PG
MCHC RBC-ENTMCNC: 32.9 GM/DL
MCV RBC AUTO: 93.4 FL
MONOCYTES # BLD AUTO: 0.44 K/UL
MONOCYTES NFR BLD AUTO: 10.4 %
NEUTROPHILS # BLD AUTO: 2.68 K/UL
NEUTROPHILS NFR BLD AUTO: 63.4 %
NITRITE UR QL STRIP: NORMAL
PH UR STRIP: 7
PHOSPHATE SERPL-MCNC: 3.7 MG/DL
PLATELET # BLD AUTO: 293 K/UL
POTASSIUM SERPL-SCNC: 4.7 MMOL/L
PROT SERPL-MCNC: 6.5 G/DL
PROT UR STRIP-MCNC: NORMAL
RBC # BLD: 4.39 M/UL
RBC # FLD: 12.5 %
SODIUM SERPL-SCNC: 139 MMOL/L
SP GR UR STRIP: 1.01
WBC # FLD AUTO: 4.23 K/UL

## 2022-10-23 NOTE — HISTORY OF PRESENT ILLNESS
[FreeTextEntry1] : ADEN GRANT is a 82 year old woman who presents for follow up office visit for further evaluation of joint symptoms and rheumatic diseases including osteoarthritis, osteoporosis, Sjogren's syndrome, bilateral carpal tunnel syndrome and chronic low back pain.\par \par Patient feels fairly well. Occasional pain bilateral hands and dorsum right foot with prolonged walking. Some dry eyes and dry mouth, using artificial tears and biotene mouthwash with relief. Paresthesias left fingers - improved and patient continues to wear wrist splints h.s. with relief. The patient continues calcium, vitamin D supplements and Prolia injections every 6 months (last June 2, 2022) without side effect or complication. Patient denies rash or side effects with current medications. Patient is content with current medication regimen. \par \par Sh:\par June 2022 - [facility]

## 2022-10-23 NOTE — ADDENDUM
[FreeTextEntry1] : I, Becky Mckeon, acted solely as a scribe for Dr. Myron I. Kleiner, MD. on 10/10/2022.

## 2022-10-23 NOTE — ASSESSMENT
[FreeTextEntry1] : Impression: ADEN GRANT is a 82 year old woman who presents for follow up office visit for further evaluation of joint symptoms and rheumatic diseases including osteoarthritis, osteoporosis, Sjogren's syndrome, bilateral carpal tunnel syndrome and chronic low back pain.\par \par Patient has occasional pain bilateral hands and dorsum right foot with prolonged walking secondary to her osteoarthritis, which otherwise remains under good control. Some dry eyes and dry mouth consistent with Sjogren's Syndrome, using artificial tears and biotene mouthwash with relief. Her chronic low back pain remains stable at this time. Paresthesias left fingers secondary to left carpal tunnel syndrome - improved and patient continues to wear wrist splints h.s. with relief. Recent lab tests revealed no significant results or changes. The patient continues calcium, vitamin D supplements and Prolia injections every 6 months (last June 2, 2022) without side effect or complication for her osteoporosis. Patient denies rash or side effects with current medications. Patient is content with current medication regimen. \par \par Plan: I reviewed recent lab results with patient with extensive discussion\par Laboratory tests ordered - see list below - with coordination of care \par Diagnosis and prognosis discussed\par Continue current medications (other than those changed below)\par Prolia 60 mg SQ q.6 months (Possible side effects explained including AVN of jaw) - with coordination of care - to be authorized and performed after December 5, 2022 - patient already scheduled December 12, 2022--with coordination of care with the authorization team and nursing staff\par Patient declined any changes or additions to medication regimen \par Artificial tears one drop each eye q.i.d. and p.r.n.(Possible side effects explained)\par Biotene mouthwash/spray q.i.d. and p.r.n.(Possible side effects explained) \par Oral Hydration\par Patient declines oral medication for dryness \par Return visit 4  months

## 2022-10-23 NOTE — CONSULT LETTER
[Dear  ___] : Dear  [unfilled], [Consult Letter:] : I had the pleasure of evaluating your patient, [unfilled]. [Please see my note below.] : Please see my note below. [Consult Closing:] : Thank you very much for allowing me to participate in the care of this patient.  If you have any questions, please do not hesitate to contact me. [Sincerely,] : Sincerely, [FreeTextEntry3] : Sunil\par Myron I. Kleiner, M.D., FACR \par Chief, Division of Rheumatology\par Department of Medicine \par [facility]

## 2022-10-23 NOTE — PHYSICAL EXAM
[General Appearance - Alert] : alert [General Appearance - In No Acute Distress] : in no acute distress [General Appearance - Well Nourished] : well nourished [General Appearance - Well Developed] : well developed [General Appearance - Well-Appearing] : healthy appearing [Sclera] : the sclera and conjunctiva were normal [PERRL With Normal Accommodation] : pupils were equal in size, round, and reactive to light [Extraocular Movements] : extraocular movements were intact [Outer Ear] : the ears and nose were normal in appearance [Neck Appearance] : the appearance of the neck was normal [Neck Cervical Mass (___cm)] : no neck mass was observed [Jugular Venous Distention Increased] : there was no jugular-venous distention [Thyroid Diffuse Enlargement] : the thyroid was not enlarged [Thyroid Nodule] : there were no palpable thyroid nodules [Lungs Percussion] : the lungs were normal to percussion [Heart Rate And Rhythm] : heart rate was normal and rhythm regular [Edema] : there was no peripheral edema [Abdomen Soft] : soft [Abdomen Tenderness] : non-tender [Abdomen Mass (___ Cm)] : no abdominal mass palpated [Cervical Lymph Nodes Enlarged Posterior Bilaterally] : posterior cervical [Cervical Lymph Nodes Enlarged Anterior Bilaterally] : anterior cervical [Supraclavicular Lymph Nodes Enlarged Bilaterally] : supraclavicular [Axillary Lymph Nodes Enlarged Bilaterally] : axillary [No CVA Tenderness] : no ~M costovertebral angle tenderness [No Spinal Tenderness] : no spinal tenderness [Skin Color & Pigmentation] : normal skin color and pigmentation [Skin Turgor] : normal skin turgor [] : no rash [Motor Exam] : the motor exam was normal [No Focal Deficits] : no focal deficits [Oriented To Time, Place, And Person] : oriented to person, place, and time [Impaired Insight] : insight and judgment were intact [Affect] : the affect was normal [Mood] : the mood was normal [FreeTextEntry1] : Strength-5/5

## 2022-12-07 RX ORDER — DENOSUMAB 60 MG/ML
60 INJECTION SUBCUTANEOUS
Qty: 1 | Refills: 0 | Status: COMPLETED | OUTPATIENT
Start: 2022-12-07 | End: 1900-01-01

## 2022-12-12 ENCOUNTER — APPOINTMENT (OUTPATIENT)
Dept: RHEUMATOLOGY | Facility: CLINIC | Age: 82
End: 2022-12-12

## 2022-12-20 ENCOUNTER — APPOINTMENT (OUTPATIENT)
Dept: RHEUMATOLOGY | Facility: CLINIC | Age: 82
End: 2022-12-20

## 2022-12-20 VITALS
SYSTOLIC BLOOD PRESSURE: 154 MMHG | OXYGEN SATURATION: 96 % | TEMPERATURE: 98.5 F | HEART RATE: 77 BPM | RESPIRATION RATE: 18 BRPM | DIASTOLIC BLOOD PRESSURE: 60 MMHG

## 2022-12-20 PROCEDURE — 96372 THER/PROPH/DIAG INJ SC/IM: CPT

## 2022-12-20 RX ORDER — DENOSUMAB 60 MG/ML
60 INJECTION SUBCUTANEOUS
Qty: 1 | Refills: 0 | Status: COMPLETED | OUTPATIENT
Start: 2022-12-07

## 2023-02-22 ENCOUNTER — RESULT CHARGE (OUTPATIENT)
Age: 83
End: 2023-02-22

## 2023-02-22 ENCOUNTER — APPOINTMENT (OUTPATIENT)
Dept: RHEUMATOLOGY | Facility: CLINIC | Age: 83
End: 2023-02-22
Payer: MEDICARE

## 2023-02-22 VITALS
TEMPERATURE: 97.3 F | SYSTOLIC BLOOD PRESSURE: 135 MMHG | DIASTOLIC BLOOD PRESSURE: 80 MMHG | HEART RATE: 92 BPM | OXYGEN SATURATION: 99 %

## 2023-02-22 DIAGNOSIS — G56.01 CARPAL TUNNEL SYNDROME, RIGHT UPPER LIMB: ICD-10-CM

## 2023-02-22 DIAGNOSIS — M25.571 PAIN IN RIGHT ANKLE AND JOINTS OF RIGHT FOOT: ICD-10-CM

## 2023-02-22 DIAGNOSIS — G89.29 PAIN IN RIGHT ANKLE AND JOINTS OF RIGHT FOOT: ICD-10-CM

## 2023-02-22 PROCEDURE — 99215 OFFICE O/P EST HI 40 MIN: CPT | Mod: 25

## 2023-02-22 PROCEDURE — G2212 PROLONG OUTPT/OFFICE VIS: CPT

## 2023-02-22 PROCEDURE — 36415 COLL VENOUS BLD VENIPUNCTURE: CPT

## 2023-03-03 LAB
ALBUMIN SERPL ELPH-MCNC: 4.4 G/DL
ALP BLD-CCNC: 89 U/L
ALT SERPL-CCNC: 19 U/L
ANION GAP SERPL CALC-SCNC: 11 MMOL/L
AST SERPL-CCNC: 25 U/L
BASOPHILS # BLD AUTO: 0.03 K/UL
BASOPHILS NFR BLD AUTO: 0.7 %
BILIRUB SERPL-MCNC: 0.3 MG/DL
BILIRUB UR QL STRIP: NORMAL
BUN SERPL-MCNC: 14 MG/DL
CALCIUM SERPL-MCNC: 9.6 MG/DL
CCP AB SER IA-ACNC: <8 UNITS
CHLORIDE SERPL-SCNC: 99 MMOL/L
CK SERPL-CCNC: 97 U/L
CLARITY UR: CLEAR
CO2 SERPL-SCNC: 27 MMOL/L
COLLECTION METHOD: NORMAL
CREAT SERPL-MCNC: 0.66 MG/DL
CRP SERPL-MCNC: <3 MG/L
EGFR: 89 ML/MIN/1.73M2
ENA SS-A AB SER IA-ACNC: <0.2 AL
ENA SS-B AB SER IA-ACNC: <0.2 AL
EOSINOPHIL # BLD AUTO: 0.3 K/UL
EOSINOPHIL NFR BLD AUTO: 6.9 %
ERYTHROCYTE [SEDIMENTATION RATE] IN BLOOD BY WESTERGREN METHOD: 6 MM/HR
GLUCOSE SERPL-MCNC: 81 MG/DL
GLUCOSE UR-MCNC: NORMAL
HCG UR QL: 0.2 EU/DL
HCT VFR BLD CALC: 40.1 %
HGB BLD-MCNC: 13 G/DL
HGB UR QL STRIP.AUTO: NORMAL
IMM GRANULOCYTES NFR BLD AUTO: 0.2 %
KETONES UR-MCNC: NORMAL
LDH SERPL-CCNC: 203 U/L
LEUKOCYTE ESTERASE UR QL STRIP: NORMAL
LYMPHOCYTES # BLD AUTO: 0.9 K/UL
LYMPHOCYTES NFR BLD AUTO: 20.6 %
MAN DIFF?: NORMAL
MCHC RBC-ENTMCNC: 31 PG
MCHC RBC-ENTMCNC: 32.4 GM/DL
MCV RBC AUTO: 95.5 FL
MONOCYTES # BLD AUTO: 0.54 K/UL
MONOCYTES NFR BLD AUTO: 12.4 %
NEUTROPHILS # BLD AUTO: 2.59 K/UL
NEUTROPHILS NFR BLD AUTO: 59.2 %
NITRITE UR QL STRIP: NORMAL
PH UR STRIP: 7
PHOSPHATE SERPL-MCNC: 4.2 MG/DL
PLATELET # BLD AUTO: 279 K/UL
POTASSIUM SERPL-SCNC: 4.6 MMOL/L
PROT SERPL-MCNC: 6.4 G/DL
PROT UR STRIP-MCNC: NORMAL
RBC # BLD: 4.2 M/UL
RBC # FLD: 13.2 %
RF+CCP IGG SER-IMP: NEGATIVE
RHEUMATOID FACT SER QL: <10 IU/ML
SODIUM SERPL-SCNC: 137 MMOL/L
SP GR UR STRIP: 1.01
WBC # FLD AUTO: 4.37 K/UL

## 2023-03-27 NOTE — ADDENDUM
[FreeTextEntry1] : I, Dillon Shannona, acted solely as a scribe for Dr. Myron I. Kleiner, MD. on 02/22/2023 .\par \par

## 2023-03-27 NOTE — CONSULT LETTER
[Dear  ___] : Dear  [unfilled], [Consult Letter:] : I had the pleasure of evaluating your patient, [unfilled]. [Please see my note below.] : Please see my note below. [Consult Closing:] : Thank you very much for allowing me to participate in the care of this patient.  If you have any questions, please do not hesitate to contact me. [Sincerely,] : Sincerely, [FreeTextEntry3] : Sunil\par Myron I. Kleiner, M.D., FACR \par Chief, Division of Rheumatology\par Department of Medicine \par [facility]

## 2023-03-27 NOTE — PHYSICAL EXAM
[General Appearance - Alert] : alert [General Appearance - In No Acute Distress] : in no acute distress [General Appearance - Well Nourished] : well nourished [General Appearance - Well Developed] : well developed [General Appearance - Well-Appearing] : healthy appearing [Sclera] : the sclera and conjunctiva were normal [PERRL With Normal Accommodation] : pupils were equal in size, round, and reactive to light [Extraocular Movements] : extraocular movements were intact [Outer Ear] : the ears and nose were normal in appearance [Neck Appearance] : the appearance of the neck was normal [Neck Cervical Mass (___cm)] : no neck mass was observed [Jugular Venous Distention Increased] : there was no jugular-venous distention [Thyroid Diffuse Enlargement] : the thyroid was not enlarged [Thyroid Nodule] : there were no palpable thyroid nodules [No CVA Tenderness] : no ~M costovertebral angle tenderness [No Spinal Tenderness] : no spinal tenderness [No Focal Deficits] : no focal deficits [Lungs Percussion] : the lungs were normal to percussion [Heart Rate And Rhythm] : heart rate was normal and rhythm regular [Edema] : there was no peripheral edema [Abdomen Soft] : soft [Abdomen Tenderness] : non-tender [Abdomen Mass (___ Cm)] : no abdominal mass palpated [Cervical Lymph Nodes Enlarged Posterior Bilaterally] : posterior cervical [Cervical Lymph Nodes Enlarged Anterior Bilaterally] : anterior cervical [Supraclavicular Lymph Nodes Enlarged Bilaterally] : supraclavicular [Axillary Lymph Nodes Enlarged Bilaterally] : axillary [Skin Color & Pigmentation] : normal skin color and pigmentation [Skin Turgor] : normal skin turgor [] : no rash [Cranial Nerves] : cranial nerves 2-12 were intact [Deep Tendon Reflexes (DTR)] : deep tendon reflexes were 2+ and symmetric [Sensation] : the sensory exam was normal to light touch and pinprick [Motor Exam] : the motor exam was normal [Oriented To Time, Place, And Person] : oriented to person, place, and time [Impaired Insight] : insight and judgment were intact [Affect] : the affect was normal [Mood] : the mood was normal [FreeTextEntry1] : Strength-5/5

## 2023-03-27 NOTE — ASU PREOP CHECKLIST - HEIGHT IN FEET
[Right Foot Was Examined] : Right foot ~C was examined [Left Foot Was Examined] : left foot ~C was examined [] : normal [de-identified] : black colored macular rash below the knee [de-identified] : gait nl [de-identified] : scaly rash [TWNoteComboBox5] : +2 [TWNoteComboBox6] : +2 5

## 2023-03-27 NOTE — ASSESSMENT
[FreeTextEntry1] : Impression: ADEN GRANT is a 82 year old woman who presents for follow up office visit for further evaluation of joint symptoms and rheumatic diseases including osteoarthritis, osteoporosis, Sjogren's syndrome, bilateral carpal tunnel syndrome and chronic low back pain.\par \par Patient feels fairly well. Occasional pain right ankle with prolonged walking secondary to her osteoarthritis, which otherwise remains under good control. Some dry eyes and dry mouth secondary to her Sjogren's syndrome, using artificial tears, biotene mouthwash, and oral hydration with relief. Her chronic low back pain remains stable at this time. Some paresthesias left fingers secondary to her carpal tunnel syndrome, patient continues wearing bilateral wrist splints h.s. with relief. On exam, patient also has bilateral thenar atrophy consistent with carpal tunnel syndrome. The patient continues calcium, vitamin D supplements and Prolia injections every 6 months (last December 20, 2022) without side effect or complication for her osteoporosis. Recent laboratory test results reveal no significant changes or results, with extensive discussion. Patient denies rash or side effects with current medications. Patient is content with current medication regimen. \par \par Plan: I reviewed  chart and previous records \par I reviewed recent lab results with patient with extensive discussion\par Laboratory tests ordered - see list below - with coordination of care\par X-ray ordered - see list below - with coordination of care\par After x-rays, Obtain MRI of right ankle (no contraindications) - with coordination of care \par Diagnosis and prognosis discussed\par Continue current medications (other than those changed below)\par Prolia 60 mg SQ q.6 months (Possible side effects explained including AVN of jaw) - with coordination of care - to be authorized and performed in June 2023 - patient already scheduled June 27, 2023--with coordination of care with the authorization team and nursing staff\par Patient declined any changes or additions to medication regimen \par Artificial tears one drop each eye q.i.d. and p.r.n.(Possible side effects explained)\par Biotene mouthwash/spray q.i.d. and p.r.n.(Possible side effects explained) \par Oral Hydration\par Patient declines oral medication for dryness \par Return visit 4  months\par Total time for this office visit, including face-to-face time and non-face-to-face time, 85 minutes--- including review of the chart and previous records, review of previous lab results with extensive discussion with the patient, ordering lab tests with coordination of care, review of recent imaging reports/x-ray resultst, ordering of new x-rays with coordination of care, detailed medication history, review of medications going forward with their possible side effects, reviewed the impact of the patient's rheumatic disease on their other medical problems, reviewed the impact of the patient's other medical problems on their rheumatic disease

## 2023-03-27 NOTE — HISTORY OF PRESENT ILLNESS
[FreeTextEntry1] : ADEN GRANT is a 82 year old woman who presents for follow up office visit for further evaluation of joint symptoms and rheumatic diseases including osteoarthritis, osteoporosis, Sjogren's syndrome, bilateral carpal tunnel syndrome and chronic low back pain.\par \par Patient feels fairly well. Occasional pain right ankle with prolonged walking. Some dry eyes and dry mouth, using artificial tears, biotene mouthwash, and oral hydration with relief. Some paresthesias left fingers, patient continues wearing bilateral wrist splints h.s. with relief. The patient continues calcium, vitamin D supplements and Prolia injections every 6 months (last December 20, 2022) without side effect or complication. Patient denies rash or side effects with current medications. Patient is content with current medication regimen. \par \par PMH:\par Tremors on Primidone \par Urinary incontinence on Oxybutynin

## 2023-06-27 ENCOUNTER — APPOINTMENT (OUTPATIENT)
Dept: RHEUMATOLOGY | Facility: CLINIC | Age: 83
End: 2023-06-27
Payer: MEDICARE

## 2023-06-27 VITALS
OXYGEN SATURATION: 93 % | DIASTOLIC BLOOD PRESSURE: 75 MMHG | TEMPERATURE: 98.1 F | SYSTOLIC BLOOD PRESSURE: 116 MMHG | RESPIRATION RATE: 18 BRPM | HEART RATE: 77 BPM

## 2023-06-27 VITALS
SYSTOLIC BLOOD PRESSURE: 130 MMHG | TEMPERATURE: 97.7 F | DIASTOLIC BLOOD PRESSURE: 75 MMHG | HEART RATE: 104 BPM | WEIGHT: 145 LBS | BODY MASS INDEX: 25.69 KG/M2 | OXYGEN SATURATION: 97 %

## 2023-06-27 DIAGNOSIS — M35.00 SICCA SYNDROME, UNSPECIFIED: ICD-10-CM

## 2023-06-27 PROCEDURE — 36415 COLL VENOUS BLD VENIPUNCTURE: CPT

## 2023-06-27 PROCEDURE — G2212 PROLONG OUTPT/OFFICE VIS: CPT

## 2023-06-27 PROCEDURE — 99215 OFFICE O/P EST HI 40 MIN: CPT | Mod: 25

## 2023-06-27 PROCEDURE — 96372 THER/PROPH/DIAG INJ SC/IM: CPT

## 2023-06-27 PROCEDURE — 81003 URINALYSIS AUTO W/O SCOPE: CPT | Mod: QW

## 2023-06-27 RX ORDER — DULOXETINE HYDROCHLORIDE 30 MG/1
30 CAPSULE, DELAYED RELEASE PELLETS ORAL
Qty: 180 | Refills: 0 | Status: DISCONTINUED | COMMUNITY
Start: 2023-06-27 | End: 2023-06-27

## 2023-06-27 RX ORDER — DENOSUMAB 60 MG/ML
60 INJECTION SUBCUTANEOUS
Qty: 1 | Refills: 0 | Status: COMPLETED | OUTPATIENT
Start: 2023-06-25

## 2023-06-27 RX ORDER — OMEPRAZOLE 20 MG/1
20 CAPSULE, DELAYED RELEASE ORAL
Refills: 0 | Status: DISCONTINUED | COMMUNITY
End: 2023-06-27

## 2023-06-27 RX ORDER — OXYBUTYNIN CHLORIDE 10 MG/1
10 TABLET, EXTENDED RELEASE ORAL
Refills: 0 | Status: DISCONTINUED | COMMUNITY
End: 2023-06-27

## 2023-06-27 RX ORDER — AMLODIPINE BESYLATE 5 MG/1
5 TABLET ORAL
Qty: 90 | Refills: 0 | Status: DISCONTINUED | COMMUNITY
Start: 2019-04-23 | End: 2023-06-27

## 2023-06-27 RX ORDER — DULOXETINE HYDROCHLORIDE 20 MG/1
20 CAPSULE, DELAYED RELEASE PELLETS ORAL
Qty: 180 | Refills: 0 | Status: DISCONTINUED | COMMUNITY
Start: 2021-10-12 | End: 2023-06-27

## 2023-06-27 RX ORDER — ATORVASTATIN CALCIUM 20 MG/1
20 TABLET, FILM COATED ORAL
Qty: 90 | Refills: 0 | Status: DISCONTINUED | COMMUNITY
Start: 2019-07-03 | End: 2023-06-27

## 2023-06-27 RX ORDER — ACETAMINOPHEN 500 MG/1
500 TABLET, COATED ORAL
Qty: 100 | Refills: 0 | Status: DISCONTINUED | COMMUNITY
Start: 2021-02-04 | End: 2023-06-27

## 2023-06-27 RX ORDER — LOSARTAN POTASSIUM 100 MG/1
100 TABLET, FILM COATED ORAL
Refills: 0 | Status: DISCONTINUED | COMMUNITY
End: 2023-06-27

## 2023-07-02 LAB
ALBUMIN MFR SERPL ELPH: 62.8 %
ALBUMIN SERPL ELPH-MCNC: 4.6 G/DL
ALBUMIN SERPL-MCNC: 4.2 G/DL
ALBUMIN/GLOB SERPL: 1.7 RATIO
ALP BLD-CCNC: 94 U/L
ALPHA1 GLOB MFR SERPL ELPH: 4.4 %
ALPHA1 GLOB SERPL ELPH-MCNC: 0.3 G/DL
ALPHA2 GLOB MFR SERPL ELPH: 9.8 %
ALPHA2 GLOB SERPL ELPH-MCNC: 0.7 G/DL
ALT SERPL-CCNC: 15 U/L
ANION GAP SERPL CALC-SCNC: 11 MMOL/L
AST SERPL-CCNC: 23 U/L
B-GLOBULIN MFR SERPL ELPH: 10.2 %
B-GLOBULIN SERPL ELPH-MCNC: 0.7 G/DL
BILIRUB SERPL-MCNC: 0.3 MG/DL
BILIRUB UR QL STRIP: NORMAL
BUN SERPL-MCNC: 16 MG/DL
CALCIUM SERPL-MCNC: 9.8 MG/DL
CCP AB SER IA-ACNC: <8 UNITS
CHLORIDE SERPL-SCNC: 101 MMOL/L
CK SERPL-CCNC: 105 U/L
CLARITY UR: CLEAR
CO2 SERPL-SCNC: 25 MMOL/L
COLLECTION METHOD: NORMAL
CREAT SERPL-MCNC: 0.72 MG/DL
CRP SERPL-MCNC: <3 MG/L
DEPRECATED KAPPA LC FREE/LAMBDA SER: 1.83 RATIO
EGFR: 84 ML/MIN/1.73M2
ENA SS-A AB SER IA-ACNC: <0.2 AL
ENA SS-B AB SER IA-ACNC: <0.2 AL
ERYTHROCYTE [SEDIMENTATION RATE] IN BLOOD BY WESTERGREN METHOD: 2 MM/HR
GAMMA GLOB FLD ELPH-MCNC: 0.9 G/DL
GAMMA GLOB MFR SERPL ELPH: 12.8 %
GLUCOSE SERPL-MCNC: 88 MG/DL
GLUCOSE UR-MCNC: NORMAL
HCG UR QL: 0.2 EU/DL
HGB UR QL STRIP.AUTO: NORMAL
IGA SER QL IEP: 115 MG/DL
IGG SER QL IEP: 783 MG/DL
IGM SER QL IEP: 94 MG/DL
INTERPRETATION SERPL IEP-IMP: NORMAL
KAPPA LC CSF-MCNC: 1.43 MG/DL
KAPPA LC SERPL-MCNC: 2.61 MG/DL
KETONES UR-MCNC: NORMAL
LDH SERPL-CCNC: 208 U/L
LEUKOCYTE ESTERASE UR QL STRIP: NORMAL
M PROTEIN SPEC IFE-MCNC: NORMAL
NITRITE UR QL STRIP: NORMAL
PH UR STRIP: 6
PHOSPHATE SERPL-MCNC: 3.7 MG/DL
POTASSIUM SERPL-SCNC: 4.4 MMOL/L
PROT SERPL-MCNC: 6.7 G/DL
PROT UR STRIP-MCNC: NORMAL
RF+CCP IGG SER-IMP: NEGATIVE
RHEUMATOID FACT SER QL: <10 IU/ML
SODIUM SERPL-SCNC: 138 MMOL/L
SP GR UR STRIP: 1.02
TSH SERPL-ACNC: 1.49 UIU/ML

## 2023-07-02 RX ORDER — METOPROLOL TARTRATE 75 MG/1
TABLET, FILM COATED ORAL
Refills: 0 | Status: DISCONTINUED | COMMUNITY

## 2023-07-02 RX ORDER — IBUPROFEN 200 MG
600 CAPSULE ORAL
Refills: 5 | Status: ACTIVE | COMMUNITY

## 2023-07-02 RX ORDER — LOSARTAN POTASSIUM 100 MG/1
TABLET, FILM COATED ORAL
Refills: 0 | Status: ACTIVE | COMMUNITY

## 2023-07-02 RX ORDER — ACETAMINOPHEN 500 MG/1
500 TABLET, COATED ORAL
Qty: 100 | Refills: 0 | Status: ACTIVE | COMMUNITY

## 2023-07-02 RX ORDER — LEVETIRACETAM 750 MG/1
750 TABLET, FILM COATED ORAL
Refills: 0 | Status: ACTIVE | COMMUNITY

## 2023-07-02 RX ORDER — OXYBUTYNIN CHLORIDE 10 MG/1
10 TABLET, EXTENDED RELEASE ORAL
Refills: 0 | Status: ACTIVE | COMMUNITY

## 2023-07-02 RX ORDER — PRIMIDONE 50 MG/1
50 TABLET ORAL
Refills: 0 | Status: ACTIVE | COMMUNITY

## 2023-07-02 RX ORDER — OMEPRAZOLE 20 MG/1
20 CAPSULE, DELAYED RELEASE ORAL
Refills: 0 | Status: ACTIVE | COMMUNITY

## 2023-07-02 RX ORDER — DULOXETINE HYDROCHLORIDE 20 MG/1
20 CAPSULE, DELAYED RELEASE PELLETS ORAL
Qty: 60 | Refills: 0 | Status: DISCONTINUED | COMMUNITY
End: 2023-07-02

## 2023-07-02 RX ORDER — ALPRAZOLAM 2 MG/1
TABLET ORAL
Refills: 0 | Status: DISCONTINUED | COMMUNITY

## 2023-07-02 NOTE — ASSESSMENT
[FreeTextEntry1] : Impression: ADEN GRANT is a 83 year old woman who presents for follow up office visit for further evaluation of joint symptoms and rheumatic diseases including osteoarthritis, osteoporosis, Sjogren's syndrome, bilateral carpal tunnel syndrome and chronic low back pain.\par \par Patient feels fairly well. Persistent pain right ankle with prolonged walking secondary to her osteoarthritis, which otherwise remains under good control. Her chronic low back pain remains quiescent.  The current dose of duloxetine 40 mg once daily is not giving her adequate relief.  Some dry eyes and dry mouth secondary to her Sjogren's syndrome, using artificial tears, biotene mouthwash, and oral hydration with relief. Some paresthesias left fingers secondary to her carpal tunnel syndrome, any time of day, patient continues wearing bilateral wrist splints h.s. with relief. On exam, patient also has left thenar atrophy consistent with carpal tunnel syndrome. The patient continues calcium, vitamin D supplements and Prolia injections every 6 months (last December 20, 2022) without side effect or complication for osteoporosis. Recent X-rays reveal osteoarthritis in multiple areas, bilateral heel spurs, bilateral midfoot arthrosis, with bulky osteophytes and joint space loss,  with extensive discussion. Patient denies rash or side effects with current medications. Patient is content with current medication regimen. \par \par Plan: I reviewed  chart and previous records \par I reviewed recent lab results with patient with extensive discussion\par I reviewed recent x-ray results with patient with extensive discussion\par Laboratory tests ordered - see list below - with coordination of care\par Right Ankle MRI ordered (no contraindications) - with coordination of care \par Diagnosis and prognosis discussed\par Continue current medications (other than those changed below)\par Increase Duloxetine 30 mg b.i.d (Possible side effects explained)\par Patient declined any changes or additions to medication regimen \par Artificial tears one drop each eye q.i.d. and p.r.n.(Possible side effects explained)\par Biotene mouthwash/spray q.i.d. and p.r.n.(Possible side effects explained) \par Oral Hydration\par Patient declines oral medication for dryness \par Foot and Ankle Orthopedic Dr. Rivas Morales, regarding chronic right ankle pain- obtain right ankle MRI first - send consultation report--with coordination of care\par Prolia 60 mg subcutaneous--to be administered today in the office-- coordination of care with authorization team and nursing staff\par Return visit 4  months\par  total time for this office visit, including face-to-face time and non-face-to-face time, 70 minutes--- including review of the chart and previous records, review of previous lab results with extensive discussion with the patient, ordering lab tests with coordination of care, review of previous t imaging reports/x-ray results,  ordering of new MRI right ankle with coordination of care, detailed medication history, generating referral to foot and ankle orthopedic with coordination of care, review of medications going forward with their possible side effects

## 2023-07-02 NOTE — CONSULT LETTER
[Dear  ___] : Dear  [unfilled], [Consult Letter:] : I had the pleasure of evaluating your patient, [unfilled]. [Please see my note below.] : Please see my note below. [Consult Closing:] : Thank you very much for allowing me to participate in the care of this patient.  If you have any questions, please do not hesitate to contact me. [Sincerely,] : Sincerely, [FreeTextEntry3] : Sunil\par Myron I. Kleiner, M.D., FACR \par Chief, Division of Rheumatology\par Department of Medicine \par [facility]

## 2023-07-02 NOTE — HISTORY OF PRESENT ILLNESS
[FreeTextEntry1] : ADEN GRANT is a 83 year old woman who presents for follow up office visit for further evaluation of joint symptoms and rheumatic diseases including osteoarthritis, osteoporosis, Sjogren's syndrome, bilateral carpal tunnel syndrome and chronic low back pain.\par \par Patient feels fairly well. Persistent right ankle with prolonged walking. Some dry eyes and dry mouth, using artificial tears, biotene mouthwash, and oral hydration with adequate relief. Some paresthesias left fingers, anytime of day, patient continues wearing bilateral wrist splints h.s. with relief. The patient continues calcium, vitamin D supplements and Prolia injections every 6 months (last December 20, 2022) - to have next Prolia injection today after visit- without side effect or complication. Patient denies rash or side effects with current medications. Patient is content with current medication regimen.

## 2023-07-02 NOTE — ADDENDUM
[FreeTextEntry1] : I, Dillon Shannona, acted solely as a scribe for Dr. Myron I. Kleiner, MD. on 06/27/2023 .

## 2023-09-15 ASSESSMENT — KOOS JR
IMPORTED KOOS JR SCORE: 100.0
IMPORTED FORM: YES
HOW SEVERE IS YOUR KNEE STIFFNESS AFTER FIRST WAKING IN MORNING: MODERATE
IMPORTED LATERALITY: LEFT
RISING FROM SITTING: MODERATE
STRAIGHTENING KNEE FULLY: MILD
KOOS JR RAW SCORE: 0
IMPORTED KOOS JR SCORE: 39.62
STANDING UPRIGHT: SEVERE
BENDING TO THE FLOOR TO PICK UP OBJECT: MILD
KOOS JR RAW SCORE: 5
KOOS JR RAW SCORE: 19
IMPORTED KOOS JR SCORE: 73.34
GOING UP OR DOWN STAIRS: SEVERE
BENDING TO THE FLOOR TO PICK UP OBJECT: MODERATE
KOOS JR RAW SCORE: 19
IMPORTED KOOS JR SCORE: 73.34
IMPORTED KOOS JR SCORE: 100.0
HOW SEVERE IS YOUR KNEE STIFFNESS AFTER FIRST WAKING IN MORNING: SEVERE
GOING UP OR DOWN STAIRS: MILD
RISING FROM SITTING: MODERATE
IMPORTED FORM: YES
TWISING OR PIVOTING ON KNEE: SEVERE
GOING UP OR DOWN STAIRS: MILD
IMPORTED LATERALITY: LEFT
STRAIGHTENING KNEE FULLY: SEVERE
TWISING OR PIVOTING ON KNEE: SEVERE
STRAIGHTENING KNEE FULLY: SEVERE
HOW SEVERE IS YOUR KNEE STIFFNESS AFTER FIRST WAKING IN MORNING: SEVERE
STANDING UPRIGHT: SEVERE
STRAIGHTENING KNEE FULLY: MILD
KOOS JR RAW SCORE: 5
IMPORTED KOOS JR SCORE: 39.62
HOW SEVERE IS YOUR KNEE STIFFNESS AFTER FIRST WAKING IN MORNING: MODERATE
GOING UP OR DOWN STAIRS: SEVERE
BENDING TO THE FLOOR TO PICK UP OBJECT: MODERATE
BENDING TO THE FLOOR TO PICK UP OBJECT: MILD
KOOS JR RAW SCORE: 0

## 2023-10-31 ENCOUNTER — APPOINTMENT (OUTPATIENT)
Dept: RHEUMATOLOGY | Facility: CLINIC | Age: 83
End: 2023-10-31
Payer: MEDICARE

## 2023-10-31 DIAGNOSIS — G89.29 PAIN IN RIGHT ANKLE AND JOINTS OF RIGHT FOOT: ICD-10-CM

## 2023-10-31 DIAGNOSIS — M25.571 PAIN IN RIGHT ANKLE AND JOINTS OF RIGHT FOOT: ICD-10-CM

## 2023-10-31 DIAGNOSIS — H04.123 DRY EYE SYNDROME OF BILATERAL LACRIMAL GLANDS: ICD-10-CM

## 2023-10-31 PROCEDURE — 99215 OFFICE O/P EST HI 40 MIN: CPT

## 2023-10-31 PROCEDURE — 81003 URINALYSIS AUTO W/O SCOPE: CPT | Mod: QW

## 2023-10-31 PROCEDURE — G2212 PROLONG OUTPT/OFFICE VIS: CPT

## 2023-10-31 PROCEDURE — 36415 COLL VENOUS BLD VENIPUNCTURE: CPT

## 2023-10-31 RX ORDER — DICLOFENAC SODIUM 1% 10 MG/G
1 GEL TOPICAL
Qty: 4 | Refills: 0 | Status: ACTIVE | COMMUNITY
Start: 2023-10-31 | End: 1900-01-01

## 2023-10-31 RX ADMIN — DENOSUMAB 0 MG/ML: 60 INJECTION SUBCUTANEOUS at 00:00

## 2023-11-05 LAB
ALBUMIN SERPL ELPH-MCNC: 4.5 G/DL
ALP BLD-CCNC: 73 U/L
ALT SERPL-CCNC: 18 U/L
ANION GAP SERPL CALC-SCNC: 13 MMOL/L
AST SERPL-CCNC: 27 U/L
BASOPHILS # BLD AUTO: 0.04 K/UL
BASOPHILS NFR BLD AUTO: 0.8 %
BILIRUB SERPL-MCNC: 0.4 MG/DL
BILIRUB UR QL STRIP: NEGATIVE
BUN SERPL-MCNC: 11 MG/DL
CALCIUM SERPL-MCNC: 9.6 MG/DL
CHLORIDE SERPL-SCNC: 98 MMOL/L
CK SERPL-CCNC: 117 U/L
CLARITY UR: CLEAR
CO2 SERPL-SCNC: 23 MMOL/L
COLLECTION METHOD: NORMAL
CREAT SERPL-MCNC: 0.55 MG/DL
CRP SERPL-MCNC: <3 MG/L
EGFR: 92 ML/MIN/1.73M2
ENA SS-A AB SER IA-ACNC: <0.2 AL
ENA SS-B AB SER IA-ACNC: <0.2 AL
EOSINOPHIL # BLD AUTO: 0.35 K/UL
EOSINOPHIL NFR BLD AUTO: 6.6 %
ERYTHROCYTE [SEDIMENTATION RATE] IN BLOOD BY WESTERGREN METHOD: 4 MM/HR
GLUCOSE SERPL-MCNC: 72 MG/DL
GLUCOSE UR-MCNC: NEGATIVE
HCG UR QL: NEGATIVE EU/DL
HCT VFR BLD CALC: 42 %
HGB BLD-MCNC: 13.5 G/DL
HGB UR QL STRIP.AUTO: NEGATIVE
IMM GRANULOCYTES NFR BLD AUTO: 0.4 %
KETONES UR-MCNC: NEGATIVE
LDH SERPL-CCNC: 309 U/L
LEUKOCYTE ESTERASE UR QL STRIP: NORMAL
LYMPHOCYTES # BLD AUTO: 1.04 K/UL
LYMPHOCYTES NFR BLD AUTO: 19.6 %
MAGNESIUM SERPL-MCNC: 2 MG/DL
MAN DIFF?: NORMAL
MCHC RBC-ENTMCNC: 31.8 PG
MCHC RBC-ENTMCNC: 32.1 GM/DL
MCV RBC AUTO: 99.1 FL
MONOCYTES # BLD AUTO: 0.62 K/UL
MONOCYTES NFR BLD AUTO: 11.7 %
NEUTROPHILS # BLD AUTO: 3.24 K/UL
NEUTROPHILS NFR BLD AUTO: 60.9 %
NITRITE UR QL STRIP: NEGATIVE
PH UR STRIP: 7
PHOSPHATE SERPL-MCNC: 3.8 MG/DL
PLATELET # BLD AUTO: 324 K/UL
POTASSIUM SERPL-SCNC: 5.1 MMOL/L
PROT SERPL-MCNC: 6.7 G/DL
PROT UR STRIP-MCNC: NEGATIVE
RBC # BLD: 4.24 M/UL
RBC # FLD: 13 %
SODIUM SERPL-SCNC: 135 MMOL/L
SP GR UR STRIP: 1.02
WBC # FLD AUTO: 5.31 K/UL

## 2023-12-29 RX ORDER — DENOSUMAB 60 MG/ML
60 INJECTION SUBCUTANEOUS
Qty: 1 | Refills: 0 | Status: ACTIVE | COMMUNITY
Start: 2021-02-04

## 2024-01-03 ENCOUNTER — APPOINTMENT (OUTPATIENT)
Dept: RHEUMATOLOGY | Facility: CLINIC | Age: 84
End: 2024-01-03
Payer: MEDICARE

## 2024-01-03 VITALS
DIASTOLIC BLOOD PRESSURE: 84 MMHG | RESPIRATION RATE: 16 BRPM | SYSTOLIC BLOOD PRESSURE: 153 MMHG | HEART RATE: 80 BPM | TEMPERATURE: 98.2 F | OXYGEN SATURATION: 96 %

## 2024-01-03 PROCEDURE — 96372 THER/PROPH/DIAG INJ SC/IM: CPT

## 2024-01-03 RX ORDER — DENOSUMAB 60 MG/ML
60 INJECTION SUBCUTANEOUS
Qty: 1 | Refills: 0 | Status: COMPLETED | OUTPATIENT
Start: 2023-10-31

## 2024-04-01 ENCOUNTER — NON-APPOINTMENT (OUTPATIENT)
Age: 84
End: 2024-04-01

## 2024-04-19 ENCOUNTER — APPOINTMENT (OUTPATIENT)
Dept: RHEUMATOLOGY | Facility: CLINIC | Age: 84
End: 2024-04-19
Payer: MEDICARE

## 2024-04-19 VITALS
SYSTOLIC BLOOD PRESSURE: 118 MMHG | OXYGEN SATURATION: 97 % | TEMPERATURE: 97.4 F | RESPIRATION RATE: 17 BRPM | HEART RATE: 88 BPM | HEIGHT: 63 IN | DIASTOLIC BLOOD PRESSURE: 80 MMHG

## 2024-04-19 DIAGNOSIS — Z86.69 PERSONAL HISTORY OF OTHER DISEASES OF THE NERVOUS SYSTEM AND SENSE ORGANS: ICD-10-CM

## 2024-04-19 DIAGNOSIS — M79.671 PAIN IN RIGHT FOOT: ICD-10-CM

## 2024-04-19 DIAGNOSIS — M81.0 AGE-RELATED OSTEOPOROSIS W/OUT CURRENT PATHOLOGICAL FRACTURE: ICD-10-CM

## 2024-04-19 DIAGNOSIS — G56.02 CARPAL TUNNEL SYNDROME, LEFT UPPER LIMB: ICD-10-CM

## 2024-04-19 DIAGNOSIS — G56.03 CARPAL TUNNEL SYNDROM,BILATERAL UPPER LIMBS: ICD-10-CM

## 2024-04-19 DIAGNOSIS — Z51.81 ENCOUNTER FOR THERAPEUTIC DRUG LVL MONITORING: ICD-10-CM

## 2024-04-19 DIAGNOSIS — M15.9 POLYOSTEOARTHRITIS, UNSPECIFIED: ICD-10-CM

## 2024-04-19 DIAGNOSIS — M35.01 SICCA SYNDROME WITH KERATOCONJUNCTIVITIS: ICD-10-CM

## 2024-04-19 DIAGNOSIS — Z79.899 ENCOUNTER FOR THERAPEUTIC DRUG LVL MONITORING: ICD-10-CM

## 2024-04-19 DIAGNOSIS — M79.645 PAIN IN LEFT FINGER(S): ICD-10-CM

## 2024-04-19 DIAGNOSIS — Z86.19 PERSONAL HISTORY OF OTHER INFECTIOUS AND PARASITIC DISEASES: ICD-10-CM

## 2024-04-19 DIAGNOSIS — Z79.1 LONG TERM (CURRENT) USE OF NON-STEROIDAL ANTI-INFLAMMATORIES (NSAID): ICD-10-CM

## 2024-04-19 PROCEDURE — G2211 COMPLEX E/M VISIT ADD ON: CPT

## 2024-04-19 PROCEDURE — 36415 COLL VENOUS BLD VENIPUNCTURE: CPT

## 2024-04-19 PROCEDURE — 81003 URINALYSIS AUTO W/O SCOPE: CPT | Mod: QW

## 2024-04-19 PROCEDURE — 99215 OFFICE O/P EST HI 40 MIN: CPT

## 2024-04-19 PROCEDURE — G2212 PROLONG OUTPT/OFFICE VIS: CPT

## 2024-04-19 RX ORDER — DULOXETINE HYDROCHLORIDE 30 MG/1
30 CAPSULE, DELAYED RELEASE PELLETS ORAL
Qty: 270 | Refills: 0 | Status: ACTIVE | COMMUNITY
Start: 2023-07-02 | End: 1900-01-01

## 2024-04-20 ENCOUNTER — NON-APPOINTMENT (OUTPATIENT)
Age: 84
End: 2024-04-20

## 2024-04-20 LAB
25(OH)D3 SERPL-MCNC: 73.4 NG/ML
ALBUMIN SERPL ELPH-MCNC: 4.7 G/DL
ALP BLD-CCNC: 86 U/L
ALT SERPL-CCNC: 20 U/L
ANION GAP SERPL CALC-SCNC: 11 MMOL/L
AST SERPL-CCNC: 28 U/L
BASOPHILS # BLD AUTO: 0.04 K/UL
BASOPHILS NFR BLD AUTO: 0.8 %
BILIRUB SERPL-MCNC: 0.4 MG/DL
BILIRUB UR QL STRIP: NORMAL
BUN SERPL-MCNC: 11 MG/DL
CALCIUM SERPL-MCNC: 10.2 MG/DL
CALCIUM SERPL-MCNC: 10.2 MG/DL
CHLORIDE SERPL-SCNC: 103 MMOL/L
CK SERPL-CCNC: 86 U/L
CLARITY UR: CLEAR
CO2 SERPL-SCNC: 27 MMOL/L
COLLECTION METHOD: NORMAL
CREAT SERPL-MCNC: 0.66 MG/DL
CRP SERPL-MCNC: <3 MG/L
EGFR: 88 ML/MIN/1.73M2
EOSINOPHIL # BLD AUTO: 0.57 K/UL
EOSINOPHIL NFR BLD AUTO: 10.7 %
ERYTHROCYTE [SEDIMENTATION RATE] IN BLOOD BY WESTERGREN METHOD: 3 MM/HR
GLUCOSE SERPL-MCNC: 88 MG/DL
GLUCOSE UR-MCNC: NORMAL
HCG UR QL: 0.2 EU/DL
HCT VFR BLD CALC: 42.6 %
HGB BLD-MCNC: 13.6 G/DL
HGB UR QL STRIP.AUTO: NORMAL
IMM GRANULOCYTES NFR BLD AUTO: 0.4 %
KETONES UR-MCNC: NORMAL
LDH SERPL-CCNC: 248 U/L
LEUKOCYTE ESTERASE UR QL STRIP: NORMAL
LYMPHOCYTES # BLD AUTO: 0.94 K/UL
LYMPHOCYTES NFR BLD AUTO: 17.7 %
MAGNESIUM SERPL-MCNC: 2.3 MG/DL
MAN DIFF?: NORMAL
MCHC RBC-ENTMCNC: 31.4 PG
MCHC RBC-ENTMCNC: 31.9 GM/DL
MCV RBC AUTO: 98.4 FL
MONOCYTES # BLD AUTO: 0.51 K/UL
MONOCYTES NFR BLD AUTO: 9.6 %
NEUTROPHILS # BLD AUTO: 3.23 K/UL
NEUTROPHILS NFR BLD AUTO: 60.8 %
NITRITE UR QL STRIP: NORMAL
PARATHYROID HORMONE INTACT: 45 PG/ML
PH UR STRIP: 7
PHOSPHATE SERPL-MCNC: 4.2 MG/DL
PLATELET # BLD AUTO: 337 K/UL
POTASSIUM SERPL-SCNC: 6.3 MMOL/L
PROT SERPL-MCNC: 6.9 G/DL
PROT UR STRIP-MCNC: NORMAL
RBC # BLD: 4.33 M/UL
RBC # FLD: 12.7 %
SODIUM SERPL-SCNC: 141 MMOL/L
SP GR UR STRIP: 1.01
TSH SERPL-ACNC: 1.92 UIU/ML
WBC # FLD AUTO: 5.31 K/UL

## 2024-04-21 LAB
ENA SS-A AB SER IA-ACNC: <0.2 AL
ENA SS-B AB SER IA-ACNC: <0.2 AL

## 2024-04-22 LAB
ANION GAP SERPL CALC-SCNC: 13 MMOL/L
BUN SERPL-MCNC: 14 MG/DL
CALCIUM SERPL-MCNC: 9.9 MG/DL
CHLORIDE SERPL-SCNC: 101 MMOL/L
CO2 SERPL-SCNC: 26 MMOL/L
CREAT SERPL-MCNC: 0.69 MG/DL
EGFR: 87 ML/MIN/1.73M2
GLUCOSE SERPL-MCNC: 76 MG/DL
POTASSIUM SERPL-SCNC: 5.3 MMOL/L
SODIUM SERPL-SCNC: 140 MMOL/L

## 2024-05-03 LAB
ENDOMYSIUM IGA SER QL: NEGATIVE
ENDOMYSIUM IGA TITR SER: NORMAL
GLIADIN IGA SER QL: <5 UNITS
GLIADIN IGG SER QL: <5 UNITS
GLIADIN PEPTIDE IGA SER-ACNC: NEGATIVE
GLIADIN PEPTIDE IGG SER-ACNC: NEGATIVE
TTG IGA SER IA-ACNC: <1.2 U/ML
TTG IGA SER-ACNC: NEGATIVE
TTG IGG SER IA-ACNC: 49.9 U/ML
TTG IGG SER IA-ACNC: POSITIVE

## 2024-05-03 NOTE — PATIENT PROFILE ADULT. - TOBACCO USE
[FreeTextEntry1] : Left lower lobe pulmonary nodule CT chest April 5, 2024 f/u April 2025 History pulmonary nodule History of prostate cancer History of renal cell carcinoma Unchanged 4 mm left lower lobe pulmonary nodule Additional left lower lobe superior segment 3 mm solid pulmonary nodule Negative adenopathy Overall impression Comparison to August 31, 2023 no change in the subcentimeter pulmonary nodules Recommendation 1 year CT chest follow-up  April 2023 reported a 6 mm with no suspicious features Repeat this past September with a coronary artery calcium CAT scan reported at a different imaging center at 4 mm Patient is scheduled for repeat CT abdomen pelvis at [facility] and patient was requested to assess for images through the lower chest to reevaluate pulmonary nodule for stability Favor a postinflammatory pulmonary nodule The likelihood of this being a malignant lesion is much less There is also no other evidence of adenopathy or metastatic disease in the chest From a pulmonary perspective I would recommend a formal chest CT scan in 6 months Based on his high risk All questions answered 
Former smoker

## 2024-05-07 ENCOUNTER — NON-APPOINTMENT (OUTPATIENT)
Age: 84
End: 2024-05-07

## 2024-05-28 ENCOUNTER — NON-APPOINTMENT (OUTPATIENT)
Age: 84
End: 2024-05-28

## 2024-06-10 PROBLEM — Z86.69 HISTORY OF PERIPHERAL NEUROPATHY: Status: RESOLVED | Noted: 2024-06-10 | Resolved: 2024-06-10

## 2024-06-10 PROBLEM — Z86.19 HISTORY OF RESPIRATORY SYNCYTIAL VIRUS (RSV) INFECTION: Status: RESOLVED | Noted: 2024-06-10 | Resolved: 2024-06-10

## 2024-06-10 RX ORDER — VITAMIN B COMPLEX
CAPSULE ORAL
Refills: 0 | Status: ACTIVE | COMMUNITY

## 2024-06-10 RX ORDER — PSYLLIUM HUSK 0.4 G
CAPSULE ORAL
Refills: 0 | Status: ACTIVE | COMMUNITY

## 2024-06-10 RX ORDER — FLUOXETINE HYDROCHLORIDE 20 MG/5ML
20 SOLUTION ORAL
Refills: 0 | Status: ACTIVE | COMMUNITY

## 2024-06-10 RX ORDER — AMLODIPINE BESYLATE 10 MG/1
10 TABLET ORAL
Refills: 0 | Status: ACTIVE | COMMUNITY

## 2024-06-10 RX ORDER — ATORVASTATIN CALCIUM 20 MG/1
20 TABLET, FILM COATED ORAL
Refills: 0 | Status: ACTIVE | COMMUNITY

## 2024-06-10 NOTE — HISTORY OF PRESENT ILLNESS
[FreeTextEntry1] : 83-year-old woman for follow up office visit regarding her joint symptoms and rheumatic diseases, including osteoarthritis, Sjogren's syndrome, osteoporosis, bilateral carpal tunnel syndrome, and chronic low back pain.  Note: Patient was last seen October 2023  She has occasional pain dorsum right foot especially secondary to walking for exercise, applies diclofenac gel which is helpful.  Intermittent pain base of the left applying diclofenac gel as needed which is helpful.  She denies recent dry eyes and dry mouth, using artificial tears, Biotene mouthwash, and oral hydration with adequate relief.  Patient continues calcium and vitamin D supplements and Prolia injection every 6 months (last injection January 30, 2024) without side effect or complication.  She now informs me that she needs a tooth extraction secondary to a tooth fracture for which oral surgery is planning to perform it in June and then to have a tooth implant.  She denies recent paresthesias of her fingers.  Patient denies rash or side effects with their medications.  Patient is content with their current medications.  PMH: February 2024--hospitalized with RSV--headache, hypertension, SOB, cough, disoriented--she has residual fatigue which is much improved Neurology follow-up--neuropathy, tremors--treated physical therapy and exercise--- no balance instability

## 2024-06-10 NOTE — CONSULT LETTER
[Dear  ___] : Dear  [unfilled], [Consult Letter:] : I had the pleasure of evaluating your patient, [unfilled]. [Please see my note below.] : Please see my note below. [Consult Closing:] : Thank you very much for allowing me to participate in the care of this patient.  If you have any questions, please do not hesitate to contact me. [Sincerely,] : Sincerely, [FreeTextEntry3] : Myron Myron I. Kleiner, M.D., FACR Chief, Division of Rheumatology Department of Medicine [facility] [DrReese  ___] : Dr. TAYLOR

## 2024-06-10 NOTE — CONSULT LETTER
[Dear  ___] : Dear  [unfilled], [Consult Letter:] : I had the pleasure of evaluating your patient, [unfilled]. [Please see my note below.] : Please see my note below. [Consult Closing:] : Thank you very much for allowing me to participate in the care of this patient.  If you have any questions, please do not hesitate to contact me. [Sincerely,] : Sincerely, [FreeTextEntry3] : Myron Myron I. Kleiner, M.D., FACR Chief, Division of Rheumatology Department of Medicine [facility] [DrReese  ___] : Dr. TAYLOR

## 2024-06-10 NOTE — ASSESSMENT
[FreeTextEntry1] : Impression: 83-year-old woman for follow up office visit regarding her joint symptoms and rheumatic diseases, including osteoarthritis, Sjogren's syndrome, osteoporosis, bilateral carpal tunnel syndrome, and chronic low back pain.  Note: Patient last seen October 2023  Patient has been having some pain dorsum right foot and base of left thumb secondary to her osteoarthritis.  She applies topical diclofenac gel which is helpful.  For dry eyes and dry mouth are under control secondary to her Sjogren's syndrome, using artificial tears, Biotene mouthwash, and oral hydration.  Recent bone densitometry revealed osteoporosis without symptoms.  Patient continues calcium and vitamin D supplements and Prolia injection every 6 months (last injection January 3, 2024) without side effect or complication for her osteoporosis.  Of note, she now informs me that she requires a tooth extraction secondary to a fractured tooth which oral surgery is planning to perform in June to be followed by a tooth implant.  Previous lab results were unrevealing, with extensive discussion.  Patient denies rash or side effects with their medications.  Patient is content with their current treatment regimen.  Plan: I reviewed  chart and previous records  I reviewed previous lab results with patient with extensive discussion I reviewed her bone density results with the patient including my analysis of the raw data with extensive discussion Laboratory tests ordered today-see list below--with coordination of care X-rays ordered--see list below--with coordination of care Diagnosis and prognosis discussed Continue other current medications (other than those changed below) In view of her upcoming dental procedure and work, hold Prolia injections--extensive discussion After adequate healing of her dental work and clearance by oral surgery, start Tymlos 80 mcg subcutaneous once daily x 2 years (Possible side effects explained including bone cancer in lab animals)--patient declines at this time--she wants to think about it--extensive discussion Artificial tears one drop each eye q.i.d. and p.r.n.(Possible side effects explained) Biotin mouthwash/spray q.i.d. and p.r.n.(Possible side effects explained) Oral hydration Patient declined oral medication for their dryness Return visit 4 months All questions and concerns were addressed. Total time for this office visit, including face-to-face time and non-face-to-face time, 72 minutes--- including review of the chart and previous records, detailed review of her medical history, review of previous lab results with extensive discussion with the patient, ordering lab tests with coordination of care, review of previous imaging reports/x-ray results, ordering of new x-rays with coordination of care, reviewed her recent bone densitometry results including my analysis of the raw data with extensive discussion, detailed medication history, review of medications going forward with their possible side effects, extensive discussion regarding her Prolia injections going forward which I am discontinuing in view of her upcoming dental work, extensive discussion regarding replacing the Prolia with Tymlos injections which patient declines at this time, reviewed the modalities to treat her dryness with extensive discussion as noted above

## 2024-07-08 ENCOUNTER — APPOINTMENT (OUTPATIENT)
Dept: RHEUMATOLOGY | Facility: CLINIC | Age: 84
End: 2024-07-08

## 2024-07-25 ENCOUNTER — NON-APPOINTMENT (OUTPATIENT)
Age: 84
End: 2024-07-25

## 2024-08-22 ENCOUNTER — APPOINTMENT (OUTPATIENT)
Dept: RHEUMATOLOGY | Facility: CLINIC | Age: 84
End: 2024-08-22

## 2024-08-22 ENCOUNTER — APPOINTMENT (OUTPATIENT)
Dept: RHEUMATOLOGY | Facility: CLINIC | Age: 84
End: 2024-08-22
Payer: MEDICARE

## 2024-08-22 VITALS
RESPIRATION RATE: 17 BRPM | DIASTOLIC BLOOD PRESSURE: 70 MMHG | OXYGEN SATURATION: 95 % | HEART RATE: 80 BPM | SYSTOLIC BLOOD PRESSURE: 114 MMHG | BODY MASS INDEX: 25.69 KG/M2 | HEIGHT: 63 IN | WEIGHT: 145 LBS

## 2024-08-22 VITALS
HEART RATE: 64 BPM | SYSTOLIC BLOOD PRESSURE: 117 MMHG | OXYGEN SATURATION: 96 % | RESPIRATION RATE: 15 BRPM | TEMPERATURE: 97.9 F | DIASTOLIC BLOOD PRESSURE: 74 MMHG

## 2024-08-22 DIAGNOSIS — L23.7 ALLERGIC CONTACT DERMATITIS DUE TO PLANTS, EXCEPT FOOD: ICD-10-CM

## 2024-08-22 DIAGNOSIS — M25.541 PAIN IN JOINTS OF RIGHT HAND: ICD-10-CM

## 2024-08-22 DIAGNOSIS — Z79.899 ENCOUNTER FOR THERAPEUTIC DRUG LVL MONITORING: ICD-10-CM

## 2024-08-22 DIAGNOSIS — M81.0 AGE-RELATED OSTEOPOROSIS W/OUT CURRENT PATHOLOGICAL FRACTURE: ICD-10-CM

## 2024-08-22 DIAGNOSIS — R76.8 OTHER SPECIFIED ABNORMAL IMMUNOLOGICAL FINDINGS IN SERUM: ICD-10-CM

## 2024-08-22 DIAGNOSIS — Z51.81 ENCOUNTER FOR THERAPEUTIC DRUG LVL MONITORING: ICD-10-CM

## 2024-08-22 DIAGNOSIS — G56.02 CARPAL TUNNEL SYNDROME, LEFT UPPER LIMB: ICD-10-CM

## 2024-08-22 DIAGNOSIS — M35.01 SICCA SYNDROME WITH KERATOCONJUNCTIVITIS: ICD-10-CM

## 2024-08-22 DIAGNOSIS — M15.9 POLYOSTEOARTHRITIS, UNSPECIFIED: ICD-10-CM

## 2024-08-22 DIAGNOSIS — G56.03 CARPAL TUNNEL SYNDROM,BILATERAL UPPER LIMBS: ICD-10-CM

## 2024-08-22 PROCEDURE — ZZZZZ: CPT

## 2024-08-22 PROCEDURE — 99215 OFFICE O/P EST HI 40 MIN: CPT | Mod: 25

## 2024-08-22 PROCEDURE — 81003 URINALYSIS AUTO W/O SCOPE: CPT | Mod: QW

## 2024-08-22 PROCEDURE — G2212 PROLONG OUTPT/OFFICE VIS: CPT

## 2024-08-22 PROCEDURE — 36415 COLL VENOUS BLD VENIPUNCTURE: CPT

## 2024-08-22 PROCEDURE — 96372 THER/PROPH/DIAG INJ SC/IM: CPT

## 2024-08-22 RX ORDER — DENOSUMAB 60 MG/ML
60 INJECTION SUBCUTANEOUS
Qty: 1 | Refills: 0 | Status: COMPLETED | OUTPATIENT
Start: 2024-08-22

## 2024-08-22 RX ORDER — CEVIMELINE HYDROCHLORIDE 30 MG/1
30 CAPSULE ORAL
Qty: 180 | Refills: 0 | Status: ACTIVE | COMMUNITY
Start: 2024-08-22 | End: 1900-01-01

## 2024-08-22 RX ADMIN — DENOSUMAB 60 MG/ML: 60 INJECTION SUBCUTANEOUS at 00:00

## 2024-08-26 PROBLEM — L23.7 POISON IVY DERMATITIS: Status: RESOLVED | Noted: 2024-08-26 | Resolved: 2024-08-26

## 2024-08-26 LAB
ALBUMIN SERPL ELPH-MCNC: 4.5 G/DL
ALP BLD-CCNC: 95 U/L
ALT SERPL-CCNC: 18 U/L
ANION GAP SERPL CALC-SCNC: 10 MMOL/L
AST SERPL-CCNC: 25 U/L
BASOPHILS # BLD AUTO: 0.04 K/UL
BASOPHILS NFR BLD AUTO: 0.7 %
BILIRUB SERPL-MCNC: 0.4 MG/DL
BILIRUB UR QL STRIP: NEGATIVE
BUN SERPL-MCNC: 10 MG/DL
CALCIUM SERPL-MCNC: 10 MG/DL
CHLORIDE SERPL-SCNC: 101 MMOL/L
CK SERPL-CCNC: 76 U/L
CLARITY UR: CLEAR
CO2 SERPL-SCNC: 28 MMOL/L
COLLECTION METHOD: NORMAL
CREAT SERPL-MCNC: 0.69 MG/DL
CRP SERPL-MCNC: <3 MG/L
EGFR: 87 ML/MIN/1.73M2
ENA SS-A AB SER IA-ACNC: <0.2 AL
ENA SS-B AB SER IA-ACNC: <0.2 AL
EOSINOPHIL # BLD AUTO: 0.4 K/UL
EOSINOPHIL NFR BLD AUTO: 7 %
ERYTHROCYTE [SEDIMENTATION RATE] IN BLOOD BY WESTERGREN METHOD: 2 MM/HR
GLUCOSE SERPL-MCNC: 84 MG/DL
GLUCOSE UR-MCNC: NEGATIVE
HCG UR QL: 0.2 EU/DL
HCT VFR BLD CALC: 39.5 %
HGB BLD-MCNC: 12.6 G/DL
HGB UR QL STRIP.AUTO: NEGATIVE
IMM GRANULOCYTES NFR BLD AUTO: 0.2 %
KETONES UR-MCNC: NEGATIVE
LDH SERPL-CCNC: 221 U/L
LEUKOCYTE ESTERASE UR QL STRIP: NEGATIVE
LYMPHOCYTES # BLD AUTO: 0.99 K/UL
LYMPHOCYTES NFR BLD AUTO: 17.4 %
MAGNESIUM SERPL-MCNC: 2.1 MG/DL
MAN DIFF?: NORMAL
MCHC RBC-ENTMCNC: 31 PG
MCHC RBC-ENTMCNC: 31.9 GM/DL
MCV RBC AUTO: 97.1 FL
MONOCYTES # BLD AUTO: 0.61 K/UL
MONOCYTES NFR BLD AUTO: 10.7 %
NEUTROPHILS # BLD AUTO: 3.63 K/UL
NEUTROPHILS NFR BLD AUTO: 64 %
NITRITE UR QL STRIP: NEGATIVE
PH UR STRIP: 7
PHOSPHATE SERPL-MCNC: 4.6 MG/DL
PLATELET # BLD AUTO: 318 K/UL
POTASSIUM SERPL-SCNC: 5.4 MMOL/L
PROT SERPL-MCNC: 6.8 G/DL
PROT UR STRIP-MCNC: NEGATIVE
RBC # BLD: 4.07 M/UL
RBC # FLD: 13.3 %
SODIUM SERPL-SCNC: 139 MMOL/L
SP GR UR STRIP: 1.02
TTG IGA SER IA-ACNC: <0.5 U/ML
TTG IGA SER-ACNC: NEGATIVE
TTG IGG SER IA-ACNC: <0.8 U/ML
TTG IGG SER IA-ACNC: NEGATIVE
WBC # FLD AUTO: 5.68 K/UL

## 2024-08-26 NOTE — HISTORY OF PRESENT ILLNESS
[FreeTextEntry1] : ADEN RIVERAALMITATEMO 84-year-old woman for follow up office visit regarding her joint symptoms and rheumatic diseases, including osteoarthritis, Sjogren's syndrome, osteoporosis, bilateral carpal tunnel syndrome, and chronic low back pain.  Pt has recent persistent dry eyes and dry mouth using biotene mouthwash and oral hydration with adequate relief, denies using artificial tears secondary to not providing relief. Recent occasional pain in left 2nd finger. Patient temporarily discontinued Prolia injections (last injection January 30, 2024) without side effects or complications, secondary to tooth extraction and removal of root that was performed in May. The patient continues calcium and vitamin D supplements.  Patient denies rash or side effects with current medications. Patient is content with current medication regimen.   PMH  Recent poison Ivy - residual improving rash on b/l forearms - PCP treated IM steroids and topical steroid cream  SH: August 2024 --travel to [place]

## 2024-08-26 NOTE — CONSULT LETTER
[Dear  ___] : Dear  [unfilled], [Consult Letter:] : I had the pleasure of evaluating your patient, [unfilled]. [Please see my note below.] : Please see my note below. [Consult Closing:] : Thank you very much for allowing me to participate in the care of this patient.  If you have any questions, please do not hesitate to contact me. [Sincerely,] : Sincerely, [DrReese  ___] : Dr. TAYLOR [FreeTextEntry3] : Myron Myron I. Kleiner, M.D., FACR Chief, Division of Rheumatology Department of Medicine [facility]

## 2024-08-26 NOTE — PHYSICAL EXAM
[General Appearance - Alert] : alert [General Appearance - Well Nourished] : well nourished [General Appearance - In No Acute Distress] : in no acute distress [General Appearance - Well Developed] : well developed [General Appearance - Well-Appearing] : healthy appearing [Sclera] : the sclera and conjunctiva were normal [PERRL With Normal Accommodation] : pupils were equal in size, round, and reactive to light [Extraocular Movements] : extraocular movements were intact [Outer Ear] : the ears and nose were normal in appearance [Oropharynx] : the oropharynx was normal [Neck Appearance] : the appearance of the neck was normal [Neck Cervical Mass (___cm)] : no neck mass was observed [Jugular Venous Distention Increased] : there was no jugular-venous distention [Thyroid Diffuse Enlargement] : the thyroid was not enlarged [Lungs Percussion] : the lungs were normal to percussion [Heart Rate And Rhythm] : heart rate was normal and rhythm regular [Edema] : there was no peripheral edema [Abdomen Soft] : soft [Abdomen Tenderness] : non-tender [Abdomen Mass (___ Cm)] : no abdominal mass palpated [Cervical Lymph Nodes Enlarged Posterior Bilaterally] : posterior cervical [Cervical Lymph Nodes Enlarged Anterior Bilaterally] : anterior cervical [Supraclavicular Lymph Nodes Enlarged Bilaterally] : supraclavicular [Axillary Lymph Nodes Enlarged Bilaterally] : axillary [No CVA Tenderness] : no ~M costovertebral angle tenderness [No Spinal Tenderness] : no spinal tenderness [Skin Color & Pigmentation] : normal skin color and pigmentation [Skin Turgor] : normal skin turgor [] : no rash [Sensation] : the sensory exam was normal to light touch and pinprick [Cranial Nerves] : cranial nerves 2-12 were intact [Motor Exam] : the motor exam was normal [No Focal Deficits] : no focal deficits [Oriented To Time, Place, And Person] : oriented to person, place, and time [Impaired Insight] : insight and judgment were intact [Affect] : the affect was normal [Mood] : the mood was normal [FreeTextEntry1] : Strength-5/5

## 2024-08-26 NOTE — CONSULT LETTER
[Dear  ___] : Dear  [unfilled], [Consult Letter:] : I had the pleasure of evaluating your patient, [unfilled]. [Please see my note below.] : Please see my note below. [Consult Closing:] : Thank you very much for allowing me to participate in the care of this patient.  If you have any questions, please do not hesitate to contact me. [Sincerely,] : Sincerely, [DrReese  ___] : Dr. TAYLOR [FreeTextEntry3] : Myron Myron I. Kleiner, M.D., FACR Chief, Division of Rheumatology Department of Medicine [facility]

## 2024-08-26 NOTE — CONSULT LETTER
[Dear  ___] : Dear  [unfilled], [Consult Letter:] : I had the pleasure of evaluating your patient, [unfilled]. [Please see my note below.] : Please see my note below. [Consult Closing:] : Thank you very much for allowing me to participate in the care of this patient.  If you have any questions, please do not hesitate to contact me. [Sincerely,] : Sincerely, [DrReese  ___] : Dr. TAYLOR [FreeTextEntry3] : Myron Myron I. Kleiner, M.D., FACR Chief, Division of Rheumatology Department of Medicine [facility]

## 2024-08-26 NOTE — PHYSICAL EXAM
[General Appearance - Alert] : alert [General Appearance - In No Acute Distress] : in no acute distress [General Appearance - Well Nourished] : well nourished [General Appearance - Well Developed] : well developed [General Appearance - Well-Appearing] : healthy appearing [Sclera] : the sclera and conjunctiva were normal [PERRL With Normal Accommodation] : pupils were equal in size, round, and reactive to light [Extraocular Movements] : extraocular movements were intact [Outer Ear] : the ears and nose were normal in appearance [Oropharynx] : the oropharynx was normal [Neck Appearance] : the appearance of the neck was normal [Neck Cervical Mass (___cm)] : no neck mass was observed [Jugular Venous Distention Increased] : there was no jugular-venous distention [Thyroid Diffuse Enlargement] : the thyroid was not enlarged [Lungs Percussion] : the lungs were normal to percussion [Heart Rate And Rhythm] : heart rate was normal and rhythm regular [Edema] : there was no peripheral edema [Abdomen Soft] : soft [Abdomen Tenderness] : non-tender [Abdomen Mass (___ Cm)] : no abdominal mass palpated [Cervical Lymph Nodes Enlarged Posterior Bilaterally] : posterior cervical [Cervical Lymph Nodes Enlarged Anterior Bilaterally] : anterior cervical [Supraclavicular Lymph Nodes Enlarged Bilaterally] : supraclavicular [Axillary Lymph Nodes Enlarged Bilaterally] : axillary [No CVA Tenderness] : no ~M costovertebral angle tenderness [No Spinal Tenderness] : no spinal tenderness [Skin Color & Pigmentation] : normal skin color and pigmentation [Skin Turgor] : normal skin turgor [] : no rash [Cranial Nerves] : cranial nerves 2-12 were intact [Sensation] : the sensory exam was normal to light touch and pinprick [Motor Exam] : the motor exam was normal [No Focal Deficits] : no focal deficits [Oriented To Time, Place, And Person] : oriented to person, place, and time [Impaired Insight] : insight and judgment were intact [Affect] : the affect was normal [Mood] : the mood was normal [FreeTextEntry1] : Shoulders/elbows--normal;  Wrists--, left thenar atrophy, crepitus bilateral left first CMC joints with prominent bony enlargement bilaterally Hands-Aicha's/Heberden's nodes, bilateral decreased fist ability, mild tenderness left 2nd PIP Hips-bilateral decreased external rotation Knees-bilateral crepitus Ankles- right mild pain on FROM feet-normal

## 2024-08-26 NOTE — ADDENDUM
[FreeTextEntry1] :  I, Dillon Llamas, acted solely as a scribe for Dr. Myron I. Kleiner, MD. on 08/22/2024. I personally performed the services described in the documentation, reviewed the documentation recorded by the scribe in my presence, and it accurately and completely records my words and actions.

## 2024-08-26 NOTE — PHYSICAL EXAM
[General Appearance - Alert] : alert [General Appearance - Well Nourished] : well nourished [General Appearance - In No Acute Distress] : in no acute distress [General Appearance - Well Developed] : well developed [General Appearance - Well-Appearing] : healthy appearing [Sclera] : the sclera and conjunctiva were normal [PERRL With Normal Accommodation] : pupils were equal in size, round, and reactive to light [Extraocular Movements] : extraocular movements were intact [Outer Ear] : the ears and nose were normal in appearance [Oropharynx] : the oropharynx was normal [Neck Appearance] : the appearance of the neck was normal [Neck Cervical Mass (___cm)] : no neck mass was observed [Jugular Venous Distention Increased] : there was no jugular-venous distention [Thyroid Diffuse Enlargement] : the thyroid was not enlarged [Lungs Percussion] : the lungs were normal to percussion [Heart Rate And Rhythm] : heart rate was normal and rhythm regular [Edema] : there was no peripheral edema [Abdomen Soft] : soft [Abdomen Tenderness] : non-tender [Abdomen Mass (___ Cm)] : no abdominal mass palpated [Cervical Lymph Nodes Enlarged Posterior Bilaterally] : posterior cervical [Cervical Lymph Nodes Enlarged Anterior Bilaterally] : anterior cervical [Supraclavicular Lymph Nodes Enlarged Bilaterally] : supraclavicular [Axillary Lymph Nodes Enlarged Bilaterally] : axillary [No CVA Tenderness] : no ~M costovertebral angle tenderness [No Spinal Tenderness] : no spinal tenderness [Skin Color & Pigmentation] : normal skin color and pigmentation [Skin Turgor] : normal skin turgor [] : no rash [Cranial Nerves] : cranial nerves 2-12 were intact [Sensation] : the sensory exam was normal to light touch and pinprick [Motor Exam] : the motor exam was normal [No Focal Deficits] : no focal deficits [Oriented To Time, Place, And Person] : oriented to person, place, and time [Impaired Insight] : insight and judgment were intact [Affect] : the affect was normal [Mood] : the mood was normal [FreeTextEntry1] : Shoulders/elbows--normal;  Wrists--, left thenar atrophy, crepitus bilateral left first CMC joints with prominent bony enlargement bilaterally Hands-Aicha's/Heberden's nodes, bilateral decreased fist ability, mild tenderness left 2nd PIP Hips-bilateral decreased external rotation Knees-bilateral crepitus Ankles- right mild pain on FROM feet-normal

## 2024-08-26 NOTE — ASSESSMENT
[FreeTextEntry1] : Impression: ADEN GRANT 84-year-old woman for follow up office visit regarding her joint symptoms and rheumatic diseases, including osteoarthritis, Sjogren's syndrome, osteoporosis, bilateral carpal tunnel syndrome, and chronic low back pain.  Pt has recent persistent dry eyes and dry mouth, secondary to Sjogren's Syndrome, using biotene mouthwash and oral hydration with adequate relief, denies using artificial tears secondary to not providing relief. Recent occasional pain in left 2nd finger secondary to osteoarthritis, and on PE pt had mild tenderness left 2nd PIP which contributes to the pain. Patient temporarily discontinued Prolia injections (last injection January 30, 2024) without side effects or complications, secondary to tooth extraction and removal of root that was performed in May. Recent lab results reveal trans glutamate IgG antibody positive, Vitamin D 73.4 (70-80), otherwise unrevealing -- With extensive discussion. Recent Xray results reveal osteoarthritis in b/l wrists, hands, various IP joints, and b/l feet -- With extensive discussion. The patient continues calcium and vitamin D supplements and Prolia injections every 6 months (last injection January 3, 2024) for her osteoporosis.  Patient denies rash or side effects with current medications. Patient is content with current medication regimen.   Plan: I reviewed  chart and previous records  I reviewed previous lab results with patient with extensive discussion  X-rays results reviewed with the patient with extensive discussion Laboratory tests ordered today-see list below--with coordination of care Diagnosis and prognosis discussed Continue other current medications (other than those changed below) Considered NSAID's - pt declined at this time--extensive discussion Tylenol 1000 mg t.i.d. p.r.n. or Tylenol 1300 mg b.i.d. p.r.n. (possible side effects explained)  Cevimeline 30 mg b.i.d., if no better/side effects in 7 days, increase to t.i.d (possible side effects explained) Restart Prolia 60 mg SQ q.6 months (Possible side effects explained including AVN of jaw) - To be authorized and performed--- with coordination of care with the authorization team and nursing staff--to be performed today after her office visit with me Artificial tears one drop each eye q.i.d. and p.r.n.(Possible side effects explained) Biotin mouthwash/spray q.i.d. and p.r.n.(Possible side effects explained) Oral hydration F/ with PCP or GI regarding ? celiac sprue regarding positive serology-- with coordination of care - please send me consult report Return visit 4 months All questions and concerns were addressed. Total time for this office visit, including face-to-face time and non-face-to-face time, 87 minutes--- including review of the chart and previous records, detailed review of her medical history, review of previous lab results with extensive discussion with the patient, ordering lab tests with coordination of care, review of recent imaging reports/x-ray results with extensive discussion with the patient, detailed medication history, review of medications going forward with their possible side effects, arranged Prolia injection today after her office visit with me with coordination of care with the authorization team and nursing staff, reviewed modalities for treatment of her dryness, extensive discussion regarding her lab results including abnormal serology for celiac sprue for which I recommended that she follow-up with PCP or GI with extensive discussion, reviewed the impact of the patient's rheumatic disease on their other medical problems, reviewed the impact of the patient's other medical problems on their rheumatic disease Addendum: Repeat transglutaminase was negative

## 2025-01-28 ENCOUNTER — APPOINTMENT (OUTPATIENT)
Dept: RHEUMATOLOGY | Facility: CLINIC | Age: 85
End: 2025-01-28
Payer: MEDICARE

## 2025-01-28 ENCOUNTER — NON-APPOINTMENT (OUTPATIENT)
Age: 85
End: 2025-01-28

## 2025-01-28 VITALS
OXYGEN SATURATION: 96 % | HEIGHT: 63 IN | HEART RATE: 91 BPM | DIASTOLIC BLOOD PRESSURE: 80 MMHG | TEMPERATURE: 98.2 F | SYSTOLIC BLOOD PRESSURE: 123 MMHG

## 2025-01-28 DIAGNOSIS — M15.9 POLYOSTEOARTHRITIS, UNSPECIFIED: ICD-10-CM

## 2025-01-28 DIAGNOSIS — G56.03 CARPAL TUNNEL SYNDROM,BILATERAL UPPER LIMBS: ICD-10-CM

## 2025-01-28 DIAGNOSIS — M81.0 AGE-RELATED OSTEOPOROSIS W/OUT CURRENT PATHOLOGICAL FRACTURE: ICD-10-CM

## 2025-01-28 DIAGNOSIS — M20.002 UNSPECIFIED DEFORMITY OF RIGHT FINGER(S): ICD-10-CM

## 2025-01-28 DIAGNOSIS — M20.001 UNSPECIFIED DEFORMITY OF RIGHT FINGER(S): ICD-10-CM

## 2025-01-28 DIAGNOSIS — Z79.620 ENCOUNTER FOR THERAPEUTIC DRUG LVL MONITORING: ICD-10-CM

## 2025-01-28 DIAGNOSIS — M35.01 SICCA SYNDROME WITH KERATOCONJUNCTIVITIS: ICD-10-CM

## 2025-01-28 DIAGNOSIS — G56.02 CARPAL TUNNEL SYNDROME, LEFT UPPER LIMB: ICD-10-CM

## 2025-01-28 DIAGNOSIS — Z51.81 ENCOUNTER FOR THERAPEUTIC DRUG LVL MONITORING: ICD-10-CM

## 2025-01-28 PROCEDURE — 99215 OFFICE O/P EST HI 40 MIN: CPT

## 2025-01-28 PROCEDURE — G2212 PROLONG OUTPT/OFFICE VIS: CPT

## 2025-01-28 PROCEDURE — G2211 COMPLEX E/M VISIT ADD ON: CPT

## 2025-01-29 RX ADMIN — DENOSUMAB 0 MG/ML: 60 INJECTION SUBCUTANEOUS at 00:00

## 2025-02-02 LAB
ALBUMIN SERPL ELPH-MCNC: 4.4 G/DL
ALP BLD-CCNC: 106 U/L
ALT SERPL-CCNC: 17 U/L
ANION GAP SERPL CALC-SCNC: 14 MMOL/L
AST SERPL-CCNC: 25 U/L
BASOPHILS # BLD AUTO: 0.04 K/UL
BASOPHILS NFR BLD AUTO: 0.8 %
BILIRUB SERPL-MCNC: 0.3 MG/DL
BUN SERPL-MCNC: 12 MG/DL
CALCIUM SERPL-MCNC: 9.6 MG/DL
CHLORIDE SERPL-SCNC: 101 MMOL/L
CK SERPL-CCNC: 83 U/L
CO2 SERPL-SCNC: 27 MMOL/L
CREAT SERPL-MCNC: 0.68 MG/DL
CRP SERPL-MCNC: <3 MG/L
EGFR: 87 ML/MIN/1.73M2
ENA SS-A AB SER IA-ACNC: <0.2 AL
ENA SS-B AB SER IA-ACNC: <0.2 AL
EOSINOPHIL # BLD AUTO: 0.36 K/UL
EOSINOPHIL NFR BLD AUTO: 7.3 %
ERYTHROCYTE [SEDIMENTATION RATE] IN BLOOD BY WESTERGREN METHOD: 8 MM/HR
GLUCOSE SERPL-MCNC: 76 MG/DL
HCT VFR BLD CALC: 36.8 %
HGB BLD-MCNC: 12.2 G/DL
IMM GRANULOCYTES NFR BLD AUTO: 0.2 %
LDH SERPL-CCNC: 219 U/L
LYMPHOCYTES # BLD AUTO: 1.01 K/UL
LYMPHOCYTES NFR BLD AUTO: 20.6 %
MAGNESIUM SERPL-MCNC: 2 MG/DL
MAN DIFF?: NORMAL
MCHC RBC-ENTMCNC: 31.5 PG
MCHC RBC-ENTMCNC: 33.2 G/DL
MCV RBC AUTO: 95.1 FL
MONOCYTES # BLD AUTO: 0.63 K/UL
MONOCYTES NFR BLD AUTO: 12.9 %
NEUTROPHILS # BLD AUTO: 2.85 K/UL
NEUTROPHILS NFR BLD AUTO: 58.2 %
PHOSPHATE SERPL-MCNC: 4.8 MG/DL
PLATELET # BLD AUTO: 291 K/UL
POTASSIUM SERPL-SCNC: 4 MMOL/L
PROT SERPL-MCNC: 6.7 G/DL
RBC # BLD: 3.87 M/UL
RBC # FLD: 13 %
SODIUM SERPL-SCNC: 142 MMOL/L
TSH SERPL-ACNC: 1.71 UIU/ML
WBC # FLD AUTO: 4.9 K/UL

## 2025-02-03 LAB
ALBUMIN MFR SERPL ELPH: 61.7 %
ALBUMIN SERPL-MCNC: 4.1 G/DL
ALBUMIN/GLOB SERPL: 1.6 RATIO
ALPHA1 GLOB MFR SERPL ELPH: 4.4 %
ALPHA1 GLOB SERPL ELPH-MCNC: 0.3 G/DL
ALPHA2 GLOB MFR SERPL ELPH: 10.2 %
ALPHA2 GLOB SERPL ELPH-MCNC: 0.7 G/DL
B-GLOBULIN MFR SERPL ELPH: 10.5 %
B-GLOBULIN SERPL ELPH-MCNC: 0.7 G/DL
DEPRECATED KAPPA LC FREE/LAMBDA SER: 1.6 RATIO
GAMMA GLOB FLD ELPH-MCNC: 0.9 G/DL
GAMMA GLOB MFR SERPL ELPH: 13.2 %
IGA SER QL IEP: 119 MG/DL
IGG SER QL IEP: 834 MG/DL
IGM SER QL IEP: 100 MG/DL
INTERPRETATION SERPL IEP-IMP: NORMAL
KAPPA LC CSF-MCNC: 1.73 MG/DL
KAPPA LC SERPL-MCNC: 2.76 MG/DL
M PROTEIN MFR SERPL ELPH: NORMAL
M PROTEIN SPEC IFE-MCNC: NORMAL
MONOCLON BAND OBS SERPL: NORMAL
PROT SERPL-MCNC: 6.7 G/DL
PROT SERPL-MCNC: 6.7 G/DL

## 2025-02-25 ENCOUNTER — APPOINTMENT (OUTPATIENT)
Dept: RHEUMATOLOGY | Facility: CLINIC | Age: 85
End: 2025-02-25
Payer: MEDICARE

## 2025-02-25 VITALS
OXYGEN SATURATION: 97 % | TEMPERATURE: 97.8 F | RESPIRATION RATE: 16 BRPM | SYSTOLIC BLOOD PRESSURE: 133 MMHG | HEART RATE: 80 BPM | DIASTOLIC BLOOD PRESSURE: 76 MMHG

## 2025-02-25 PROCEDURE — 96372 THER/PROPH/DIAG INJ SC/IM: CPT

## 2025-04-24 NOTE — PATIENT PROFILE ADULT. - PT NEEDS ASSIST
04-23 @ 22:15                    15.3  CBC: 14.55>)-------(<264                     45.8                 141 | 110 | 9    CMP:  ----------------------< 146               3.5 | 23 | 0.88                      Ca:9.5  Phos:-  Mg:-               1.1|      |62        LFTs:  ------|93|-----             -|      |-      Current Inpatient Medications:  acetaminophen     Tablet .. 650 milliGRAM(s) Oral every 6 hours  ciprofloxacin   IVPB 400 milliGRAM(s) IV Intermittent every 8 hours  ciprofloxacin   IVPB      HYDROmorphone  Injectable 0.5 milliGRAM(s) IV Push Once  HYDROmorphone  Injectable 1 milliGRAM(s) IV Push every 4 hours PRN  levothyroxine 88 MICROGram(s) Oral daily  losartan 25 milliGRAM(s) Oral daily  metroNIDAZOLE  IVPB      metroNIDAZOLE  IVPB 500 milliGRAM(s) IV Intermittent every 8 hours  ondansetron Injectable 4 milliGRAM(s) IV Push every 6 hours PRN  ondansetron Injectable 4 milliGRAM(s) IV Push every 4 hours PRN  senna 2 Tablet(s) Oral at bedtime PRN  sodium chloride 0.9%. 1000 milliLiter(s) (200 mL/Hr) IV Continuous <Continuous>
no

## 2025-07-23 ENCOUNTER — APPOINTMENT (OUTPATIENT)
Dept: RHEUMATOLOGY | Facility: CLINIC | Age: 85
End: 2025-07-23
Payer: MEDICARE

## 2025-07-23 VITALS
TEMPERATURE: 97.6 F | HEIGHT: 63 IN | RESPIRATION RATE: 17 BRPM | SYSTOLIC BLOOD PRESSURE: 130 MMHG | HEART RATE: 87 BPM | WEIGHT: 136 LBS | DIASTOLIC BLOOD PRESSURE: 80 MMHG | OXYGEN SATURATION: 96 % | BODY MASS INDEX: 24.1 KG/M2

## 2025-07-23 DIAGNOSIS — G56.02 CARPAL TUNNEL SYNDROME, LEFT UPPER LIMB: ICD-10-CM

## 2025-07-23 DIAGNOSIS — M81.0 AGE-RELATED OSTEOPOROSIS W/OUT CURRENT PATHOLOGICAL FRACTURE: ICD-10-CM

## 2025-07-23 DIAGNOSIS — I73.00 RAYNAUD'S SYNDROME W/OUT GANGRENE: ICD-10-CM

## 2025-07-23 DIAGNOSIS — M15.9 POLYOSTEOARTHRITIS, UNSPECIFIED: ICD-10-CM

## 2025-07-23 DIAGNOSIS — G56.03 CARPAL TUNNEL SYNDROM,BILATERAL UPPER LIMBS: ICD-10-CM

## 2025-07-23 DIAGNOSIS — Z51.81 ENCOUNTER FOR THERAPEUTIC DRUG LVL MONITORING: ICD-10-CM

## 2025-07-23 DIAGNOSIS — M79.644 PAIN IN RIGHT FINGER(S): ICD-10-CM

## 2025-07-23 DIAGNOSIS — M20.001 UNSPECIFIED DEFORMITY OF RIGHT FINGER(S): ICD-10-CM

## 2025-07-23 DIAGNOSIS — M20.002 UNSPECIFIED DEFORMITY OF RIGHT FINGER(S): ICD-10-CM

## 2025-07-23 DIAGNOSIS — M35.00 SICCA SYNDROME, UNSPECIFIED: ICD-10-CM

## 2025-07-23 DIAGNOSIS — Z79.620 ENCOUNTER FOR THERAPEUTIC DRUG LVL MONITORING: ICD-10-CM

## 2025-07-23 PROCEDURE — 81003 URINALYSIS AUTO W/O SCOPE: CPT | Mod: QW

## 2025-07-23 PROCEDURE — 36415 COLL VENOUS BLD VENIPUNCTURE: CPT

## 2025-07-23 PROCEDURE — G2211 COMPLEX E/M VISIT ADD ON: CPT

## 2025-07-23 PROCEDURE — G2212 PROLONG OUTPT/OFFICE VIS: CPT

## 2025-07-23 PROCEDURE — 99215 OFFICE O/P EST HI 40 MIN: CPT

## 2025-07-27 LAB
ALBUMIN SERPL ELPH-MCNC: 4.4 G/DL
ALP BLD-CCNC: 91 U/L
ALT SERPL-CCNC: 23 U/L
ANION GAP SERPL CALC-SCNC: 11 MMOL/L
AST SERPL-CCNC: 30 U/L
BASOPHILS # BLD AUTO: 0.03 K/UL
BASOPHILS NFR BLD AUTO: 0.5 %
BILIRUB SERPL-MCNC: 0.3 MG/DL
BILIRUB UR QL STRIP: NORMAL
BUN SERPL-MCNC: 14 MG/DL
CALCIUM SERPL-MCNC: 9.2 MG/DL
CHLORIDE SERPL-SCNC: 100 MMOL/L
CK SERPL-CCNC: 79 U/L
CLARITY UR: CLEAR
CO2 SERPL-SCNC: 25 MMOL/L
COLLECTION METHOD: NORMAL
CREAT SERPL-MCNC: 0.56 MG/DL
CRP SERPL-MCNC: <3 MG/L
EGFRCR SERPLBLD CKD-EPI 2021: 90 ML/MIN/1.73M2
ENA SS-A AB SER-ACNC: <0.2 AL
ENA SS-B AB SER-ACNC: <0.2 AL
EOSINOPHIL # BLD AUTO: 0.3 K/UL
EOSINOPHIL NFR BLD AUTO: 5.3 %
ERYTHROCYTE [SEDIMENTATION RATE] IN BLOOD BY WESTERGREN METHOD: 2 MM/HR
GLUCOSE SERPL-MCNC: 75 MG/DL
GLUCOSE UR-MCNC: NORMAL
HCG UR QL: 0.2 EU/DL
HCT VFR BLD CALC: 39.9 %
HGB BLD-MCNC: 12.7 G/DL
HGB UR QL STRIP.AUTO: NORMAL
IMM GRANULOCYTES NFR BLD AUTO: 0.2 %
KETONES UR-MCNC: NORMAL
LDH SERPL-CCNC: 205 U/L
LEUKOCYTE ESTERASE UR QL STRIP: NORMAL
LYMPHOCYTES # BLD AUTO: 0.8 K/UL
LYMPHOCYTES NFR BLD AUTO: 14.1 %
MAGNESIUM SERPL-MCNC: 2.1 MG/DL
MAN DIFF?: NORMAL
MCHC RBC-ENTMCNC: 31.7 PG
MCHC RBC-ENTMCNC: 31.8 G/DL
MCV RBC AUTO: 99.5 FL
MONOCYTES # BLD AUTO: 0.58 K/UL
MONOCYTES NFR BLD AUTO: 10.2 %
NEUTROPHILS # BLD AUTO: 3.96 K/UL
NEUTROPHILS NFR BLD AUTO: 69.7 %
NITRITE UR QL STRIP: NORMAL
PH UR STRIP: 7
PHOSPHATE SERPL-MCNC: 3.5 MG/DL
PLATELET # BLD AUTO: 287 K/UL
POTASSIUM SERPL-SCNC: 4.6 MMOL/L
PROT SERPL-MCNC: 6.7 G/DL
PROT UR STRIP-MCNC: NORMAL
RBC # BLD: 4.01 M/UL
RBC # FLD: 13.2 %
SODIUM SERPL-SCNC: 136 MMOL/L
SP GR UR STRIP: 1.01
WBC # FLD AUTO: 5.68 K/UL

## 2025-07-27 RX ADMIN — DENOSUMAB 0 MG/ML: 60 INJECTION SUBCUTANEOUS at 00:00

## 2025-08-29 ENCOUNTER — APPOINTMENT (OUTPATIENT)
Dept: RHEUMATOLOGY | Facility: CLINIC | Age: 85
End: 2025-08-29
Payer: MEDICARE

## 2025-08-29 VITALS
DIASTOLIC BLOOD PRESSURE: 81 MMHG | OXYGEN SATURATION: 98 % | RESPIRATION RATE: 16 BRPM | HEART RATE: 84 BPM | SYSTOLIC BLOOD PRESSURE: 131 MMHG | TEMPERATURE: 97.9 F

## 2025-08-29 PROCEDURE — 96372 THER/PROPH/DIAG INJ SC/IM: CPT

## 2025-09-03 ENCOUNTER — RX RENEWAL (OUTPATIENT)
Age: 85
End: 2025-09-03

## 2025-09-18 ENCOUNTER — APPOINTMENT (OUTPATIENT)
Dept: RHEUMATOLOGY | Facility: CLINIC | Age: 85
End: 2025-09-18